# Patient Record
Sex: FEMALE | Race: BLACK OR AFRICAN AMERICAN | NOT HISPANIC OR LATINO | Employment: FULL TIME | URBAN - METROPOLITAN AREA
[De-identification: names, ages, dates, MRNs, and addresses within clinical notes are randomized per-mention and may not be internally consistent; named-entity substitution may affect disease eponyms.]

---

## 2020-01-24 ENCOUNTER — OFFICE VISIT (OUTPATIENT)
Dept: INTERNAL MEDICINE | Facility: CLINIC | Age: 40
End: 2020-01-24

## 2020-01-24 VITALS
RESPIRATION RATE: 18 BRPM | SYSTOLIC BLOOD PRESSURE: 134 MMHG | HEIGHT: 69 IN | BODY MASS INDEX: 43.4 KG/M2 | HEART RATE: 70 BPM | TEMPERATURE: 97.1 F | DIASTOLIC BLOOD PRESSURE: 90 MMHG | WEIGHT: 293 LBS | OXYGEN SATURATION: 98 %

## 2020-01-24 DIAGNOSIS — G56.03 BILATERAL CARPAL TUNNEL SYNDROME: ICD-10-CM

## 2020-01-24 DIAGNOSIS — E53.8 B12 DEFICIENCY: ICD-10-CM

## 2020-01-24 DIAGNOSIS — D25.9 UTERINE LEIOMYOMA, UNSPECIFIED LOCATION: ICD-10-CM

## 2020-01-24 DIAGNOSIS — E66.01 MORBID OBESITY (HCC): ICD-10-CM

## 2020-01-24 DIAGNOSIS — D50.9 IRON DEFICIENCY ANEMIA, UNSPECIFIED IRON DEFICIENCY ANEMIA TYPE: ICD-10-CM

## 2020-01-24 DIAGNOSIS — E11.9 TYPE 2 DIABETES MELLITUS WITHOUT COMPLICATION, WITHOUT LONG-TERM CURRENT USE OF INSULIN (HCC): ICD-10-CM

## 2020-01-24 DIAGNOSIS — I10 ESSENTIAL HYPERTENSION: Primary | ICD-10-CM

## 2020-01-24 LAB
ALBUMIN SERPL-MCNC: 4.2 G/DL (ref 3.5–5.2)
ALBUMIN/GLOB SERPL: 1.4 G/DL
ALP SERPL-CCNC: 65 U/L (ref 39–117)
ALT SERPL W P-5'-P-CCNC: 12 U/L (ref 1–33)
ANION GAP SERPL CALCULATED.3IONS-SCNC: 14 MMOL/L (ref 5–15)
AST SERPL-CCNC: 15 U/L (ref 1–32)
BASOPHILS # BLD AUTO: 0.03 10*3/MM3 (ref 0–0.2)
BASOPHILS NFR BLD AUTO: 0.5 % (ref 0–1.5)
BILIRUB SERPL-MCNC: 0.2 MG/DL (ref 0.2–1.2)
BUN BLD-MCNC: 8 MG/DL (ref 6–20)
BUN/CREAT SERPL: 9.5 (ref 7–25)
CALCIUM SPEC-SCNC: 9.5 MG/DL (ref 8.6–10.5)
CHLORIDE SERPL-SCNC: 101 MMOL/L (ref 98–107)
CO2 SERPL-SCNC: 24 MMOL/L (ref 22–29)
CREAT BLD-MCNC: 0.84 MG/DL (ref 0.57–1)
DEPRECATED RDW RBC AUTO: 44.4 FL (ref 37–54)
EOSINOPHIL # BLD AUTO: 0.19 10*3/MM3 (ref 0–0.4)
EOSINOPHIL NFR BLD AUTO: 3.2 % (ref 0.3–6.2)
ERYTHROCYTE [DISTWIDTH] IN BLOOD BY AUTOMATED COUNT: 15 % (ref 12.3–15.4)
FERRITIN SERPL-MCNC: 10.4 NG/ML (ref 13–150)
GFR SERPL CREATININE-BSD FRML MDRD: 92 ML/MIN/1.73
GLOBULIN UR ELPH-MCNC: 3.1 GM/DL
GLUCOSE BLD-MCNC: 89 MG/DL (ref 65–99)
HBA1C MFR BLD: 6.15 % (ref 4.8–5.6)
HCT VFR BLD AUTO: 37.9 % (ref 34–46.6)
HGB BLD-MCNC: 11.7 G/DL (ref 12–15.9)
IMM GRANULOCYTES # BLD AUTO: 0.01 10*3/MM3 (ref 0–0.05)
IMM GRANULOCYTES NFR BLD AUTO: 0.2 % (ref 0–0.5)
IRON 24H UR-MRATE: 18 MCG/DL (ref 37–145)
IRON SATN MFR SERPL: 4 % (ref 20–50)
LYMPHOCYTES # BLD AUTO: 2.49 10*3/MM3 (ref 0.7–3.1)
LYMPHOCYTES NFR BLD AUTO: 41.9 % (ref 19.6–45.3)
MCH RBC QN AUTO: 25.3 PG (ref 26.6–33)
MCHC RBC AUTO-ENTMCNC: 30.9 G/DL (ref 31.5–35.7)
MCV RBC AUTO: 82 FL (ref 79–97)
MONOCYTES # BLD AUTO: 0.44 10*3/MM3 (ref 0.1–0.9)
MONOCYTES NFR BLD AUTO: 7.4 % (ref 5–12)
NEUTROPHILS # BLD AUTO: 2.78 10*3/MM3 (ref 1.7–7)
NEUTROPHILS NFR BLD AUTO: 46.8 % (ref 42.7–76)
NRBC BLD AUTO-RTO: 0 /100 WBC (ref 0–0.2)
PLATELET # BLD AUTO: 267 10*3/MM3 (ref 140–450)
PMV BLD AUTO: 11 FL (ref 6–12)
POTASSIUM BLD-SCNC: 4.3 MMOL/L (ref 3.5–5.2)
PROT SERPL-MCNC: 7.3 G/DL (ref 6–8.5)
RBC # BLD AUTO: 4.62 10*6/MM3 (ref 3.77–5.28)
SODIUM BLD-SCNC: 139 MMOL/L (ref 136–145)
TIBC SERPL-MCNC: 504 MCG/DL (ref 298–536)
TRANSFERRIN SERPL-MCNC: 338 MG/DL (ref 200–360)
VIT B12 BLD-MCNC: 277 PG/ML (ref 211–946)
WBC NRBC COR # BLD: 5.94 10*3/MM3 (ref 3.4–10.8)

## 2020-01-24 PROCEDURE — 99204 OFFICE O/P NEW MOD 45 MIN: CPT | Performed by: NURSE PRACTITIONER

## 2020-01-24 PROCEDURE — 85025 COMPLETE CBC W/AUTO DIFF WBC: CPT | Performed by: NURSE PRACTITIONER

## 2020-01-24 PROCEDURE — 82728 ASSAY OF FERRITIN: CPT | Performed by: NURSE PRACTITIONER

## 2020-01-24 PROCEDURE — 84466 ASSAY OF TRANSFERRIN: CPT | Performed by: NURSE PRACTITIONER

## 2020-01-24 PROCEDURE — 82607 VITAMIN B-12: CPT | Performed by: NURSE PRACTITIONER

## 2020-01-24 PROCEDURE — 83540 ASSAY OF IRON: CPT | Performed by: NURSE PRACTITIONER

## 2020-01-24 PROCEDURE — 80053 COMPREHEN METABOLIC PANEL: CPT | Performed by: NURSE PRACTITIONER

## 2020-01-24 PROCEDURE — 83036 HEMOGLOBIN GLYCOSYLATED A1C: CPT | Performed by: NURSE PRACTITIONER

## 2020-01-24 RX ORDER — AMLODIPINE BESYLATE 2.5 MG/1
2.5 TABLET ORAL DAILY
Qty: 30 TABLET | Refills: 1 | Status: SHIPPED | OUTPATIENT
Start: 2020-01-24 | End: 2020-06-24 | Stop reason: SDUPTHER

## 2020-01-24 RX ORDER — NAPROXEN 500 MG/1
500 TABLET ORAL 2 TIMES DAILY WITH MEALS
Qty: 60 TABLET | Refills: 1 | Status: SHIPPED | OUTPATIENT
Start: 2020-01-24 | End: 2020-09-23

## 2020-01-24 NOTE — PROGRESS NOTES
Subjective   Fuad Nielsen is a 39 y.o. female here to establish care.  Chief Complaint   Patient presents with   • Establish Care   • Upper Extremity Issue     bilateral hand/finger numbness couple months       History of Present Illness     Patient is a 39-year-old female who is here to establish care.  She does have a history of hypertension and diabetes.  She reports that her diabetes was previously well controlled and she has not followed up on it in some time.  Diabetes, type II, dx  was on metformin. Off of it by .   Weight has been increasing. Gained at least 20 lbs recently.   Has not been eating healthy or exercising.   Does not monitor glucose.      She also notes that she had some anemia that was detected in 3/2019 and required IV iron once a week for 4 weeks.  She has had no additional follow-up on this either    Hypertension- chronic. Has previously tried hctz, labetolol, metoprolol, lisinopril, amlodipine.  She is not currently on any medications for her blood pressure.  She denies any headaches, dizziness, chest pain, dyspnea, palpitations, TIA symptoms, leg pain, and edema.    Having some tingling in both of her hands. Particularly in thumb side. 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 Has to shake her hands out to improve numbness. No numbness in lower extremities.  Reports that she has a family member who had similar issues with a vitamin B12 deficiency and wants to have her B12 checked.  Patient's reports she is also had a slightly low B12 in the past.    MRI 2018 to check her uterine fibroids. Noted during  pregnancy and required . Needs new GYN     The following portions of the patient's history were reviewed and updated as appropriate: allergies, current medications, past family history, past medical history, past social history, past surgical history and problem list.    Review of Systems   Constitutional: Positive for unexpected weight change.  Negative for appetite change, chills, diaphoresis, fatigue and fever.   Eyes: Negative for visual disturbance.   Respiratory: Negative for cough, chest tightness, shortness of breath and wheezing.    Cardiovascular: Negative for chest pain, palpitations and leg swelling.   Gastrointestinal: Negative for constipation, diarrhea, nausea and vomiting.   Endocrine: Negative for polydipsia, polyphagia and polyuria.   Genitourinary: Negative for difficulty urinating and dysuria.   Skin: Negative for color change and rash.   Neurological: Positive for numbness. Negative for dizziness, syncope, weakness, light-headedness and headaches.   Psychiatric/Behavioral: Negative for sleep disturbance.   All other systems reviewed and are negative.      No Known Allergies  Past Medical History:   Diagnosis Date   • Anemia    • Diabetes (CMS/HCC) 2009    diagnosed in  and was normal in    • Fibroids     uterine   • Hypertension      Past Surgical History:   Procedure Laterality Date   •  SECTION  2018   • GASTRIC SLEEVE LAPAROSCOPIC  10/2012   • OOPHORECTOMY Right 1999   • WISDOM TOOTH EXTRACTION       Family History   Problem Relation Age of Onset   • Obesity Mother    • Hypertension Mother      Social History     Socioeconomic History   • Marital status: Single     Spouse name: Not on file   • Number of children: Not on file   • Years of education: Not on file   • Highest education level: Not on file   Tobacco Use   • Smoking status: Former Smoker     Last attempt to quit: 2018     Years since quittin.4   • Smokeless tobacco: Never Used   Substance and Sexual Activity   • Alcohol use: Yes     Frequency: 2-4 times a month     Drinks per session: 1 or 2     Comment: 1-2 per month   • Drug use: Never   • Sexual activity: Yes     Birth control/protection: Nexplanon     Immunization History   Administered Date(s) Administered   • Hepatitis A 2014   • Influenza Quad Vaccine (Inpatient) 2015  "  • Influenza, Unspecified 11/01/2019   • Pneumococcal Polysaccharide (PPSV23) 10/01/2012   • Tdap 01/01/2018   • Typhoid Inactivated 06/03/2014       Current Outpatient Medications:   •  Etonogestrel (NEXPLANON SC), Inject  under the skin into the appropriate area as directed., Disp: , Rfl:   •  amLODIPine (NORVASC) 2.5 MG tablet, Take 1 tablet by mouth Daily., Disp: 30 tablet, Rfl: 1  •  naproxen (NAPROSYN) 500 MG tablet, Take 1 tablet by mouth 2 (Two) Times a Day With Meals., Disp: 60 tablet, Rfl: 1    Objective   Blood pressure 134/90, pulse 70, temperature 97.1 °F (36.2 °C), resp. rate 18, height 176 cm (69.3\"), weight (!) 167 kg (368 lb), last menstrual period 01/10/2020, SpO2 98 %.  Physical Exam   Constitutional: She is oriented to person, place, and time. She appears well-developed and well-nourished. No distress.   HENT:   Head: Normocephalic and atraumatic.   Eyes: Pupils are equal, round, and reactive to light. Conjunctivae are normal.   Neck: Normal range of motion. No JVD present.   Cardiovascular: Normal rate, regular rhythm, normal heart sounds and intact distal pulses.   No murmur heard.  Pulmonary/Chest: Effort normal and breath sounds normal. No respiratory distress. She exhibits no tenderness.   Abdominal: Soft. Normal appearance and bowel sounds are normal. She exhibits no distension. There is no tenderness.   Musculoskeletal: Normal range of motion. She exhibits no edema.   Positive Tinel and Phalen bilaterally   Neurological: She is alert and oriented to person, place, and time.   Skin: Skin is warm and dry. She is not diaphoretic. No erythema. No pallor.   Psychiatric: She has a normal mood and affect. Her behavior is normal. Judgment and thought content normal.   Nursing note and vitals reviewed.      Assessment/Plan   Fuad was seen today for establish care and upper extremity issue.    Diagnoses and all orders for this visit:    Essential hypertension  -     amLODIPine (NORVASC) 2.5 MG " tablet; Take 1 tablet by mouth Daily.  Blood pressure is elevated in office without medications.  We will place her on amlodipine.  Adverse effects discussed.  She will monitor blood pressure daily and notify me if running greater than 130/80 consistently.      Type 2 diabetes mellitus without complication, without long-term current use of insulin (CMS/[facility])  -     CBC & Differential  -     Ferritin  -     Iron Profile  -     Comprehensive Metabolic Panel  -     Hemoglobin A1c  -     Vitamin B12  -     CBC Auto Differential  Patient was previously diagnosed with type 2 diabetes.  She has been noncompliant with follow-up on this and is not currently on medications.  Her weight has steadily been increasing.  Will check A1c and likely initiate medications.  Will likely require metformin minimally.    Iron deficiency anemia, unspecified iron deficiency anemia type  -     CBC & Differential  -     Ferritin  -     Iron Profile  -     CBC Auto Differential  Historically patient has had some iron deficiency requiring iron transfusions.  We will check her iron profile and CBC today.    Uterine leiomyoma, unspecified location  -     Ambulatory Referral to Gynecology  Refer to gynecology.    Bilateral carpal tunnel syndrome  -     naproxen (NAPROSYN) 500 MG tablet; Take 1 tablet by mouth 2 (Two) Times a Day With Meals.  -      Wrist Hand Orthosis, Wrist Extension Control Cock-up  We will do a low-dose of naproxen.  Adverse effects of been discussed.  If blood pressures running high she has been advised not to take this as NSAIDs can increase blood pressure slightly.  Blood pressure is stable enough for temporary use.  We will also use cock-up splints at night.  I discussed the possibility of nerve conduction study with patient.  She declines to do this at this time and will let me know if she changes her mind.    Morbid obesity  Encourage cardiac diabetic diet and routine physical activity for weight reduction.      B12  deficiency    -     Vitamin B12    Labs sent as above- will notify of results and treat accordingly. If patient has not received results within one week, they will notify my office       Return in about 1 month (around 2/24/2020) for Recheck.  Plan of care discussed with pt. They verbalized understanding and agreement.       * Please note that portions of this note were completed with a voice recognition program. Efforts were made to edit the dictation but occasionally words are erroneously transcribed.

## 2020-01-27 PROBLEM — I10 ESSENTIAL HYPERTENSION: Status: ACTIVE | Noted: 2020-01-27

## 2020-01-27 PROBLEM — D50.9 IRON DEFICIENCY ANEMIA: Status: ACTIVE | Noted: 2020-01-27

## 2020-01-27 PROBLEM — E53.8 B12 DEFICIENCY: Status: ACTIVE | Noted: 2020-01-27

## 2020-01-27 PROBLEM — E11.9 TYPE 2 DIABETES MELLITUS WITHOUT COMPLICATION, WITHOUT LONG-TERM CURRENT USE OF INSULIN: Status: ACTIVE | Noted: 2020-01-27

## 2020-01-27 PROBLEM — E66.01 MORBID OBESITY: Status: ACTIVE | Noted: 2020-01-27

## 2020-01-27 PROBLEM — D25.9 UTERINE LEIOMYOMA: Status: ACTIVE | Noted: 2020-01-27

## 2020-01-27 PROBLEM — G56.03 BILATERAL CARPAL TUNNEL SYNDROME: Status: ACTIVE | Noted: 2020-01-27

## 2020-01-28 ENCOUNTER — TELEPHONE (OUTPATIENT)
Dept: INTERNAL MEDICINE | Facility: CLINIC | Age: 40
End: 2020-01-28

## 2020-01-28 DIAGNOSIS — D50.8 IRON DEFICIENCY ANEMIA SECONDARY TO INADEQUATE DIETARY IRON INTAKE: Primary | ICD-10-CM

## 2020-01-28 DIAGNOSIS — E11.9 TYPE 2 DIABETES MELLITUS WITHOUT COMPLICATION, WITHOUT LONG-TERM CURRENT USE OF INSULIN (HCC): ICD-10-CM

## 2020-01-28 RX ORDER — DOXYCYCLINE HYCLATE 50 MG/1
324 CAPSULE, GELATIN COATED ORAL
Qty: 90 TABLET | Refills: 2 | Status: SHIPPED | OUTPATIENT
Start: 2020-01-28 | End: 2020-09-28 | Stop reason: SDUPTHER

## 2020-01-28 NOTE — TELEPHONE ENCOUNTER
----- Message from FAMILIA Galaviz sent at 1/28/2020  9:15 AM EST -----  Please let her know that her labs do show that her iron is low.  I will send in iron supplementation for her to take -we need to recheck in 3 months.  Her B12 is on the low end of normal as well.  She needs to take vitamin B12 supplementation over-the-counter daily at 1000 mcg.  Her hemoglobin A1c is elevated at 6.15%.  I would recommend going ahead and adding metformin on daily.      Prescriptions sent.

## 2020-03-09 ENCOUNTER — OFFICE VISIT (OUTPATIENT)
Dept: OBSTETRICS AND GYNECOLOGY | Facility: CLINIC | Age: 40
End: 2020-03-09

## 2020-03-09 VITALS
HEIGHT: 69 IN | DIASTOLIC BLOOD PRESSURE: 98 MMHG | WEIGHT: 293 LBS | BODY MASS INDEX: 43.4 KG/M2 | SYSTOLIC BLOOD PRESSURE: 142 MMHG | RESPIRATION RATE: 14 BRPM

## 2020-03-09 DIAGNOSIS — Z97.5 NEXPLANON IN PLACE: ICD-10-CM

## 2020-03-09 DIAGNOSIS — Z01.419 WELL WOMAN EXAM WITH ROUTINE GYNECOLOGICAL EXAM: ICD-10-CM

## 2020-03-09 DIAGNOSIS — Z87.42 HISTORY OF ABNORMAL CERVICAL PAPANICOLAOU SMEAR: ICD-10-CM

## 2020-03-09 DIAGNOSIS — Z01.419 WELL WOMAN EXAM WITH ROUTINE GYNECOLOGICAL EXAM: Primary | ICD-10-CM

## 2020-03-09 PROCEDURE — 99385 PREV VISIT NEW AGE 18-39: CPT | Performed by: OBSTETRICS & GYNECOLOGY

## 2020-03-09 NOTE — PROGRESS NOTES
Subjective   Chief Complaint   Patient presents with   • Establish Care     No complaints     Fuad Nielsen is a 39 y.o. year old  presenting to be seen for her annual exam.  Patient has a history of abnormal Pap smears, history of HPV, and is undergone colposcopy.  She is using Nexplanon for birth control and will need this replaced in .  She is reasonably happy with this method.  Her cycles are irregular and she is coping with this.  Patient is taking metformin irregularly for elevated hemoglobin A1c.  This is being managed by her primary care.  Patient presents today for a Pap smear and initiation of GYN care.    SEXUAL Hx:  She is currently sexually active.  In the past year there has not been new sexual partners.    Condoms are not typically used.  She would not like to be screened for STD's at today's exam.  Current birth control method: Nexplanon.  She is happy with her current method of contraception and does not want to discuss alternative methods of contraception.  MENSTRUAL Hx:  No LMP recorded (lmp unknown).  In the past 6 months her cycles have been irregular.  Her menstrual flow is typically light.   Each month on average there are roughly 1 day(s) of very heavy flow.    Intermenstrual bleeding is present.    Post-coital bleeding is absent.  Dysmenorrhea: is not affecting her activities of daily living  PMS: is not affecting her activities of daily living  Her cycles are not a source of concern for her that she wishes to discuss today.  HEALTH Hx:  She exercises regularly:no (and has no plans to become more active).  She wears her seat belt:yes.  She has concerns about domestic violence: no.  OTHER COMPLAINTS:  Nothing else    The following portions of the patient's history were reviewed and updated as appropriate:problem list, current medications, allergies, past family history, past medical history, past social history and past surgical history.    Social History    Tobacco Use       "Smoking status: Former Smoker        Types: Cigarettes        Quit date: 2018        Years since quittin.6      Smokeless tobacco: Never Used    Review of Systems  Review of Systems - History obtained from the patient  General ROS: negative  Psychological ROS: negative  ENT ROS: negative  Allergy and Immunology ROS: positive for - seasonal allergies  Hematological and Lymphatic ROS: negative  Endocrine ROS: positive for diabetes probable type II, negative for thyroid problems  Breast ROS: negative  Respiratory ROS: no cough, shortness of breath, or wheezing  Cardiovascular ROS: positive for - irregular heartbeat  Gastrointestinal ROS: no abdominal pain, change in bowel habits, or black or bloody stools  Genito-Urinary ROS: no dysuria, trouble voiding, or hematuria  Musculoskeletal ROS: negative  Neurological ROS: no TIA or stroke symptoms  Dermatological ROS: negative        Objective   /98 (BP Location: Right arm, Patient Position: Sitting, Cuff Size: Large Adult)   Resp 14   Ht 175.3 cm (69\")   Wt (!) 167 kg (369 lb)   LMP  (LMP Unknown)   Breastfeeding No   BMI 54.49 kg/m²      General:  well developed; well nourished  no acute distress   Skin:  No suspicious lesions seen   Thyroid: not examined   Breasts:  Examined in supine position  Symmetric without masses or skin dimpling  Nipples normal without inversion, lesions or discharge  There are no palpable axillary nodes   Abdomen: soft, non-tender; no masses  no umbilical or inguinal hernias are present  no hepato-splenomegaly   Heart: regular rate and rhythm, S1, S2 normal, no murmur, click, rub or gallop   Lungs: clear to auscultation   Pelvis: Clinical staff was present for exam  External genitalia:  normal appearance of the external genitalia including Bartholin's and [place]'s glands.  :  urethral meatus normal;  Vaginal:  normal pink mucosa without prolapse or lesions.  Cervix:  normal appearance. Pap smear was done  Uterus:  not " palpable.  Adnexa:  non palpable bilaterally.  Rectal:  digital rectal exam not performed; anus visually normal appearing.          Assessment/Plan   Fuad was seen today for establish care.    Diagnoses and all orders for this visit:    Well woman exam with routine gynecological exam  -     Liquid-based Pap Smear, Screening; Future    Nexplanon in place    History of abnormal cervical Papanicolaou smear         Return in 1 year Pap smear is normal  Patient will return in October for birth control counseling or replacement of Nexplanon    This note was electronically signed.    Abdulaziz Uriarte MD   March 9, 2020

## 2020-06-17 ENCOUNTER — OFFICE VISIT (OUTPATIENT)
Dept: INTERNAL MEDICINE | Facility: CLINIC | Age: 40
End: 2020-06-17

## 2020-06-17 ENCOUNTER — LAB (OUTPATIENT)
Dept: LAB | Facility: HOSPITAL | Age: 40
End: 2020-06-17

## 2020-06-17 VITALS
DIASTOLIC BLOOD PRESSURE: 98 MMHG | SYSTOLIC BLOOD PRESSURE: 140 MMHG | WEIGHT: 293 LBS | RESPIRATION RATE: 18 BRPM | OXYGEN SATURATION: 99 % | HEART RATE: 95 BPM | BODY MASS INDEX: 43.4 KG/M2 | TEMPERATURE: 97.3 F | HEIGHT: 69 IN

## 2020-06-17 DIAGNOSIS — E66.01 MORBID OBESITY WITH BMI OF 50.0-59.9, ADULT (HCC): ICD-10-CM

## 2020-06-17 DIAGNOSIS — E11.9 TYPE 2 DIABETES MELLITUS WITHOUT COMPLICATION, WITHOUT LONG-TERM CURRENT USE OF INSULIN (HCC): Primary | ICD-10-CM

## 2020-06-17 DIAGNOSIS — N89.8 VAGINAL DISCHARGE: ICD-10-CM

## 2020-06-17 DIAGNOSIS — I10 ESSENTIAL HYPERTENSION: ICD-10-CM

## 2020-06-17 DIAGNOSIS — D50.8 IRON DEFICIENCY ANEMIA SECONDARY TO INADEQUATE DIETARY IRON INTAKE: ICD-10-CM

## 2020-06-17 DIAGNOSIS — Z11.59 ENCOUNTER FOR HEPATITIS C SCREENING TEST FOR LOW RISK PATIENT: ICD-10-CM

## 2020-06-17 DIAGNOSIS — Z11.3 SCREEN FOR STD (SEXUALLY TRANSMITTED DISEASE): ICD-10-CM

## 2020-06-17 LAB
BILIRUB BLD-MCNC: NEGATIVE MG/DL
CLARITY, POC: CLEAR
COLOR UR: YELLOW
GLUCOSE UR STRIP-MCNC: NEGATIVE MG/DL
HBA1C MFR BLD: 6.1 %
KETONES UR QL: NEGATIVE
LEUKOCYTE EST, POC: NEGATIVE
NITRITE UR-MCNC: NEGATIVE MG/ML
PH UR: 6 [PH] (ref 5–8)
PROT UR STRIP-MCNC: NEGATIVE MG/DL
RBC # UR STRIP: NEGATIVE /UL
SP GR UR: 1.03 (ref 1–1.03)
UROBILINOGEN UR QL: NORMAL

## 2020-06-17 PROCEDURE — 87591 N.GONORRHOEAE DNA AMP PROB: CPT | Performed by: NURSE PRACTITIONER

## 2020-06-17 PROCEDURE — 87798 DETECT AGENT NOS DNA AMP: CPT | Performed by: NURSE PRACTITIONER

## 2020-06-17 PROCEDURE — 85027 COMPLETE CBC AUTOMATED: CPT | Performed by: NURSE PRACTITIONER

## 2020-06-17 PROCEDURE — 82043 UR ALBUMIN QUANTITATIVE: CPT | Performed by: NURSE PRACTITIONER

## 2020-06-17 PROCEDURE — 87801 DETECT AGNT MULT DNA AMPLI: CPT | Performed by: NURSE PRACTITIONER

## 2020-06-17 PROCEDURE — 80053 COMPREHEN METABOLIC PANEL: CPT | Performed by: NURSE PRACTITIONER

## 2020-06-17 PROCEDURE — 83036 HEMOGLOBIN GLYCOSYLATED A1C: CPT | Performed by: NURSE PRACTITIONER

## 2020-06-17 PROCEDURE — 99214 OFFICE O/P EST MOD 30 MIN: CPT | Performed by: NURSE PRACTITIONER

## 2020-06-17 PROCEDURE — 87661 TRICHOMONAS VAGINALIS AMPLIF: CPT | Performed by: NURSE PRACTITIONER

## 2020-06-17 PROCEDURE — 84466 ASSAY OF TRANSFERRIN: CPT | Performed by: NURSE PRACTITIONER

## 2020-06-17 PROCEDURE — 86803 HEPATITIS C AB TEST: CPT | Performed by: NURSE PRACTITIONER

## 2020-06-17 PROCEDURE — 87491 CHLMYD TRACH DNA AMP PROBE: CPT | Performed by: NURSE PRACTITIONER

## 2020-06-17 PROCEDURE — 81003 URINALYSIS AUTO W/O SCOPE: CPT | Performed by: NURSE PRACTITIONER

## 2020-06-17 PROCEDURE — 83540 ASSAY OF IRON: CPT | Performed by: NURSE PRACTITIONER

## 2020-06-17 PROCEDURE — 82728 ASSAY OF FERRITIN: CPT | Performed by: NURSE PRACTITIONER

## 2020-06-17 RX ORDER — BLOOD-GLUCOSE METER
1 KIT MISCELLANEOUS DAILY
Qty: 1 EACH | Refills: 0 | Status: SHIPPED | OUTPATIENT
Start: 2020-06-17 | End: 2021-03-29 | Stop reason: SDUPTHER

## 2020-06-17 RX ORDER — LANCETS 30 GAUGE
1 EACH MISCELLANEOUS DAILY
Qty: 100 EACH | Refills: 11 | Status: SHIPPED | OUTPATIENT
Start: 2020-06-17

## 2020-06-17 NOTE — PROGRESS NOTES
Subjective   Fuad Nielsen is a 40 y.o. female.     Chief Complaint   Patient presents with   • Hypertension   • Diabetes   • iron deficiency       History of Present Illness     Diabetes-chronic.  Type II.  Diagnosed in 2009.  Has been able to be off medicines previously due to weight loss.  As of January she had been increasing her weight and has gained 20 pounds at that time.  Her last hemoglobin A1c was 6.15% in January 2020.  She was started on metformin at that time.  Today she reports she is not taking her metformin.  Weight has increased 3 pounds since January 2020.  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   Exercise: none    Hypertension-chronic.  Patient currently on amlodipine.  BP still slightly elevated.  Does not follow a low-sodium diet.  Not routinely exercising.  She is not monitoring her blood pressure at home.    Iron deficiency-chronic.  Initially noted in 3/2019.  At her last visit she was noted to have a ferritin of 10.4.  With an H&H of 11 and 37.  she was started on ferrous gluconate 3 times a day.  She reports she has not been taking this.  She does have some mild fatigue but otherwise feels well.    He has not had routine hepatitis C screening.  Patient denies any risk factors.      The following portions of the patient's history were reviewed and updated as appropriate: allergies, current medications, past family history, past medical history, past social history, past surgical history and problem list.        Review of Systems   Constitutional: Positive for fatigue. Negative for activity change, appetite change, chills, diaphoresis, fever, unexpected weight gain and unexpected weight loss.   HENT: Negative.  Negative for voice change.    Eyes: Negative for pain and visual disturbance.   Respiratory: Negative for cough, chest tightness, shortness of breath and wheezing.    Cardiovascular: Negative for chest pain, palpitations  and leg swelling.   Gastrointestinal: Negative for abdominal distention, abdominal pain, constipation, diarrhea, nausea and vomiting.   Endocrine: Negative for cold intolerance, heat intolerance, polydipsia, polyphagia and polyuria.   Genitourinary: Negative.  Negative for difficulty urinating.   Musculoskeletal: Negative.  Negative for arthralgias and myalgias.   Skin: Negative for color change, dry skin and rash.   Neurological: Negative for dizziness, facial asymmetry, weakness, light-headedness, numbness, headache and confusion.   Hematological: Does not bruise/bleed easily.   Psychiatric/Behavioral: Negative for decreased concentration and sleep disturbance. The patient is not nervous/anxious.            Outpatient Medications Marked as Taking for the 6/17/20 encounter (Office Visit) with Mandie Tobar APRN   Medication Sig Dispense Refill   • amLODIPine (NORVASC) 2.5 MG tablet Take 1 tablet by mouth Daily. 30 tablet 1   • Etonogestrel (NEXPLANON SC) Inject  under the skin into the appropriate area as directed.     • naproxen (NAPROSYN) 500 MG tablet Take 1 tablet by mouth 2 (Two) Times a Day With Meals. 60 tablet 1           Objective   Physical Exam   Constitutional: She is oriented to person, place, and time. She appears well-developed and well-nourished. No distress.   Obesity noted     HENT:   Head: Normocephalic and atraumatic.   Eyes: Pupils are equal, round, and reactive to light. Conjunctivae are normal.   Neck: Normal range of motion. No JVD present. No thyromegaly present.   Cardiovascular: Normal rate, regular rhythm, normal heart sounds and intact distal pulses.   No murmur heard.  Pulses:       Dorsalis pedis pulses are 2+ on the right side, and 2+ on the left side.        Posterior tibial pulses are 2+ on the right side, and 2+ on the left side.   Pulmonary/Chest: Effort normal and breath sounds normal. No respiratory distress. She exhibits no tenderness.   Abdominal: Soft. Normal appearance  "and bowel sounds are normal. She exhibits no distension. There is no tenderness.   Musculoskeletal: Normal range of motion. She exhibits no edema.   Neurological: She is alert and oriented to person, place, and time. Coordination normal.   Skin: Skin is warm and dry. No rash noted. She is not diaphoretic. No erythema. No pallor.   Psychiatric: She has a normal mood and affect. Her behavior is normal. Judgment and thought content normal.   Nursing note and vitals reviewed.      Vitals:    06/17/20 1410   BP: 140/98   Pulse: 95   Resp: 18   Temp: 97.3 °F (36.3 °C)   SpO2: 99%   Weight: (!) 168 kg (371 lb)   Height: 175.3 cm (69\")     Body mass index is 54.79 kg/m².        Assessment/Plan       Diagnoses and all orders for this visit:    1. Type 2 diabetes mellitus without complication, without long-term current use of insulin (CMS/[facility]) (Primary)  -     CBC (No Diff)  -     Iron Profile  -     Ferritin  -     Comprehensive Metabolic Panel  Lab Results   Component Value Date    HGBA1C 6.1 06/17/2020     Hemoglobin A1c well controlled at 6.1%.  Continue metformin.  Encourage diabetic diet and routine physical activity for weight reduction.    2. Essential hypertension  -     CBC (No Diff)  -     Iron Profile  -     Ferritin  -     Comprehensive Metabolic Panel  BP slightly elevated.  Patient reluctant to change medications.  We will continue the amlodipine and have her monitor blood pressure and if not meeting goal of less than 130/80 consistently she will let me know.  Encourage low-sodium diet and routine physical activity for weight reduction.  3. Iron deficiency anemia secondary to inadequate dietary iron intake  -     CBC (No Diff)  -     Iron Profile  -     Ferritin  -     Comprehensive Metabolic Panel  She does have some mild fatigue.  She did not take the iron supplementation.  We will recheck iron levels and CBC.  4. Morbid obesity with BMI of 50.0-59.9, adult (CMS/[facility])  Encourage diet and exercise for weight " reduction        Return in about 3 months (around 9/17/2020).    I discussed my findings,recommendations, and plan of care was with the patient. They verbalized understanding and agreement.  Patient was encouraged to keep me informed of any acute changes, lack of improvement, or any new concerning symptoms.       * Please note that portions of this note were completed with a voice recognition program. Efforts were made to edit the dictation but occasionally words are erroneously transcribed.

## 2020-06-18 LAB
ALBUMIN SERPL-MCNC: 4.3 G/DL (ref 3.5–5.2)
ALBUMIN UR-MCNC: <1.2 MG/DL
ALBUMIN/GLOB SERPL: 1.2 G/DL
ALP SERPL-CCNC: 62 U/L (ref 39–117)
ALT SERPL W P-5'-P-CCNC: 14 U/L (ref 1–33)
ANION GAP SERPL CALCULATED.3IONS-SCNC: 8.1 MMOL/L (ref 5–15)
AST SERPL-CCNC: 19 U/L (ref 1–32)
BILIRUB SERPL-MCNC: 0.2 MG/DL (ref 0.2–1.2)
BUN BLD-MCNC: 7 MG/DL (ref 6–20)
BUN/CREAT SERPL: 7.7 (ref 7–25)
CALCIUM SPEC-SCNC: 9.6 MG/DL (ref 8.6–10.5)
CHLORIDE SERPL-SCNC: 105 MMOL/L (ref 98–107)
CO2 SERPL-SCNC: 26.9 MMOL/L (ref 22–29)
CREAT BLD-MCNC: 0.91 MG/DL (ref 0.57–1)
DEPRECATED RDW RBC AUTO: 44.8 FL (ref 37–54)
ERYTHROCYTE [DISTWIDTH] IN BLOOD BY AUTOMATED COUNT: 14.9 % (ref 12.3–15.4)
FERRITIN SERPL-MCNC: 11.3 NG/ML (ref 13–150)
GFR SERPL CREATININE-BSD FRML MDRD: 83 ML/MIN/1.73
GLOBULIN UR ELPH-MCNC: 3.5 GM/DL
GLUCOSE BLD-MCNC: 74 MG/DL (ref 65–99)
HCT VFR BLD AUTO: 38.2 % (ref 34–46.6)
HCV AB SER DONR QL: NORMAL
HGB BLD-MCNC: 12.1 G/DL (ref 12–15.9)
IRON 24H UR-MRATE: 30 MCG/DL (ref 37–145)
IRON SATN MFR SERPL: 6 % (ref 20–50)
MCH RBC QN AUTO: 26.2 PG (ref 26.6–33)
MCHC RBC AUTO-ENTMCNC: 31.7 G/DL (ref 31.5–35.7)
MCV RBC AUTO: 82.7 FL (ref 79–97)
PLATELET # BLD AUTO: 272 10*3/MM3 (ref 140–450)
PMV BLD AUTO: 11.2 FL (ref 6–12)
POTASSIUM BLD-SCNC: 4 MMOL/L (ref 3.5–5.2)
PROT SERPL-MCNC: 7.8 G/DL (ref 6–8.5)
RBC # BLD AUTO: 4.62 10*6/MM3 (ref 3.77–5.28)
SODIUM BLD-SCNC: 140 MMOL/L (ref 136–145)
TIBC SERPL-MCNC: 544 MCG/DL (ref 298–536)
TRANSFERRIN SERPL-MCNC: 365 MG/DL (ref 200–360)
WBC NRBC COR # BLD: 5.74 10*3/MM3 (ref 3.4–10.8)

## 2020-06-23 ENCOUNTER — TELEPHONE (OUTPATIENT)
Dept: INTERNAL MEDICINE | Facility: CLINIC | Age: 40
End: 2020-06-23

## 2020-06-23 DIAGNOSIS — N76.0 BV (BACTERIAL VAGINOSIS): Primary | ICD-10-CM

## 2020-06-23 DIAGNOSIS — B96.89 BV (BACTERIAL VAGINOSIS): Primary | ICD-10-CM

## 2020-06-23 LAB
A VAGINAE DNA VAG QL NAA+PROBE: ABNORMAL SCORE
BVAB2 DNA VAG QL NAA+PROBE: ABNORMAL SCORE
C ALBICANS DNA VAG QL NAA+PROBE: NEGATIVE
C GLABRATA DNA VAG QL NAA+PROBE: NEGATIVE
C TRACH RRNA SPEC DONR QL NAA+PROBE: NEGATIVE
MEGASPHAERA 1: ABNORMAL SCORE
N GONORRHOEA DNA SPEC QL NAA+PROBE: NEGATIVE
T VAGINALIS RRNA GENITAL QL PROBE: NEGATIVE

## 2020-06-23 RX ORDER — METRONIDAZOLE 500 MG/1
500 TABLET ORAL 2 TIMES DAILY
Qty: 14 TABLET | Refills: 0 | Status: SHIPPED | OUTPATIENT
Start: 2020-06-23 | End: 2020-07-02

## 2020-06-23 NOTE — TELEPHONE ENCOUNTER
----- Message from FAMILIA Galaviz sent at 6/23/2020 12:38 PM EDT -----  Please let her know that her labs show she is positive for BV.  I will send in metronidazole to treat this.  Her ferritin is still significantly low she does need to take the iron supplement.  We should recheck in 3 months  She is negative for hepatitis C  Microalbumin was normal

## 2020-06-24 DIAGNOSIS — I10 ESSENTIAL HYPERTENSION: ICD-10-CM

## 2020-06-24 RX ORDER — AMLODIPINE BESYLATE 2.5 MG/1
2.5 TABLET ORAL DAILY
Qty: 30 TABLET | Refills: 2 | Status: SHIPPED | OUTPATIENT
Start: 2020-06-24 | End: 2020-09-23 | Stop reason: SDUPTHER

## 2020-08-24 PROCEDURE — U0003 INFECTIOUS AGENT DETECTION BY NUCLEIC ACID (DNA OR RNA); SEVERE ACUTE RESPIRATORY SYNDROME CORONAVIRUS 2 (SARS-COV-2) (CORONAVIRUS DISEASE [COVID-19]), AMPLIFIED PROBE TECHNIQUE, MAKING USE OF HIGH THROUGHPUT TECHNOLOGIES AS DESCRIBED BY CMS-2020-01-R: HCPCS | Performed by: PHYSICIAN ASSISTANT

## 2020-08-26 ENCOUNTER — TELEPHONE (OUTPATIENT)
Dept: URGENT CARE | Facility: CLINIC | Age: 40
End: 2020-08-26

## 2020-08-26 NOTE — TELEPHONE ENCOUNTER
----- Message from FAMILIA Metz sent at 8/26/2020  3:28 PM EDT -----  COVID not detected.  Follow CDC recommendations for quarantine and return to work.  Patient should follow-up with PCP  Pt called and given negative covid result. Pt states she is feeling about the same; still having low grade fevers. Will f/u with PCP. Advised that CDC recommends 10 day home quarantine from onset of symptoms. [place]

## 2020-08-27 ENCOUNTER — TELEPHONE (OUTPATIENT)
Dept: INTERNAL MEDICINE | Facility: CLINIC | Age: 40
End: 2020-08-27

## 2020-09-11 PROCEDURE — 87081 CULTURE SCREEN ONLY: CPT | Performed by: NURSE PRACTITIONER

## 2020-09-11 PROCEDURE — 87147 CULTURE TYPE IMMUNOLOGIC: CPT | Performed by: NURSE PRACTITIONER

## 2020-09-12 ENCOUNTER — TELEPHONE (OUTPATIENT)
Dept: URGENT CARE | Facility: CLINIC | Age: 40
End: 2020-09-12

## 2020-09-12 NOTE — TELEPHONE ENCOUNTER
Spoke with Itzel BARBOSA; Pt was told that throat culture grew strep and she needs to complete antibiotic round; Pt told if she isn't better after 3-4 days to f/u with PCP

## 2020-09-23 ENCOUNTER — OFFICE VISIT (OUTPATIENT)
Dept: INTERNAL MEDICINE | Facility: CLINIC | Age: 40
End: 2020-09-23

## 2020-09-23 ENCOUNTER — LAB (OUTPATIENT)
Dept: LAB | Facility: HOSPITAL | Age: 40
End: 2020-09-23

## 2020-09-23 VITALS
HEART RATE: 88 BPM | HEIGHT: 69 IN | SYSTOLIC BLOOD PRESSURE: 140 MMHG | WEIGHT: 293 LBS | BODY MASS INDEX: 43.4 KG/M2 | OXYGEN SATURATION: 99 % | TEMPERATURE: 97.3 F | RESPIRATION RATE: 18 BRPM | DIASTOLIC BLOOD PRESSURE: 90 MMHG

## 2020-09-23 DIAGNOSIS — E01.0 THYROMEGALY: ICD-10-CM

## 2020-09-23 DIAGNOSIS — J02.8 ACUTE BACTERIAL PHARYNGITIS: ICD-10-CM

## 2020-09-23 DIAGNOSIS — B96.89 ACUTE BACTERIAL PHARYNGITIS: ICD-10-CM

## 2020-09-23 DIAGNOSIS — M79.671 BILATERAL FOOT PAIN: ICD-10-CM

## 2020-09-23 DIAGNOSIS — M79.672 BILATERAL FOOT PAIN: ICD-10-CM

## 2020-09-23 DIAGNOSIS — E11.9 ENCOUNTER FOR DIABETIC FOOT EXAM (HCC): ICD-10-CM

## 2020-09-23 DIAGNOSIS — D50.8 IRON DEFICIENCY ANEMIA SECONDARY TO INADEQUATE DIETARY IRON INTAKE: ICD-10-CM

## 2020-09-23 DIAGNOSIS — G56.03 BILATERAL CARPAL TUNNEL SYNDROME: ICD-10-CM

## 2020-09-23 DIAGNOSIS — I10 ESSENTIAL HYPERTENSION: Primary | ICD-10-CM

## 2020-09-23 DIAGNOSIS — E11.9 TYPE 2 DIABETES MELLITUS WITHOUT COMPLICATION, WITHOUT LONG-TERM CURRENT USE OF INSULIN (HCC): ICD-10-CM

## 2020-09-23 DIAGNOSIS — E66.01 MORBID OBESITY WITH BMI OF 50.0-59.9, ADULT (HCC): ICD-10-CM

## 2020-09-23 LAB
BASOPHILS # BLD AUTO: 0.02 10*3/MM3 (ref 0–0.2)
BASOPHILS NFR BLD AUTO: 0.4 % (ref 0–1.5)
DEPRECATED RDW RBC AUTO: 43.9 FL (ref 37–54)
EOSINOPHIL # BLD AUTO: 0.09 10*3/MM3 (ref 0–0.4)
EOSINOPHIL NFR BLD AUTO: 1.6 % (ref 0.3–6.2)
ERYTHROCYTE [DISTWIDTH] IN BLOOD BY AUTOMATED COUNT: 15 % (ref 12.3–15.4)
FERRITIN SERPL-MCNC: 11.3 NG/ML (ref 13–150)
HBA1C MFR BLD: 5.8 %
HCT VFR BLD AUTO: 36.2 % (ref 34–46.6)
HGB BLD-MCNC: 11.5 G/DL (ref 12–15.9)
IMM GRANULOCYTES # BLD AUTO: 0.01 10*3/MM3 (ref 0–0.05)
IMM GRANULOCYTES NFR BLD AUTO: 0.2 % (ref 0–0.5)
LYMPHOCYTES # BLD AUTO: 2.1 10*3/MM3 (ref 0.7–3.1)
LYMPHOCYTES NFR BLD AUTO: 37 % (ref 19.6–45.3)
MCH RBC QN AUTO: 25.9 PG (ref 26.6–33)
MCHC RBC AUTO-ENTMCNC: 31.8 G/DL (ref 31.5–35.7)
MCV RBC AUTO: 81.5 FL (ref 79–97)
MONOCYTES # BLD AUTO: 0.47 10*3/MM3 (ref 0.1–0.9)
MONOCYTES NFR BLD AUTO: 8.3 % (ref 5–12)
NEUTROPHILS NFR BLD AUTO: 2.98 10*3/MM3 (ref 1.7–7)
NEUTROPHILS NFR BLD AUTO: 52.5 % (ref 42.7–76)
NRBC BLD AUTO-RTO: 0 /100 WBC (ref 0–0.2)
PLATELET # BLD AUTO: 268 10*3/MM3 (ref 140–450)
PMV BLD AUTO: 11.5 FL (ref 6–12)
RBC # BLD AUTO: 4.44 10*6/MM3 (ref 3.77–5.28)
TSH SERPL DL<=0.05 MIU/L-ACNC: 3.34 UIU/ML (ref 0.27–4.2)
WBC # BLD AUTO: 5.67 10*3/MM3 (ref 3.4–10.8)

## 2020-09-23 PROCEDURE — 99214 OFFICE O/P EST MOD 30 MIN: CPT | Performed by: NURSE PRACTITIONER

## 2020-09-23 PROCEDURE — 84443 ASSAY THYROID STIM HORMONE: CPT

## 2020-09-23 PROCEDURE — 85025 COMPLETE CBC W/AUTO DIFF WBC: CPT

## 2020-09-23 PROCEDURE — 36415 COLL VENOUS BLD VENIPUNCTURE: CPT

## 2020-09-23 PROCEDURE — 83036 HEMOGLOBIN GLYCOSYLATED A1C: CPT | Performed by: NURSE PRACTITIONER

## 2020-09-23 PROCEDURE — 82728 ASSAY OF FERRITIN: CPT

## 2020-09-23 RX ORDER — AMLODIPINE BESYLATE 10 MG/1
10 TABLET ORAL DAILY
Qty: 30 TABLET | Refills: 6 | Status: SHIPPED | OUTPATIENT
Start: 2020-09-23 | End: 2021-02-19 | Stop reason: SDUPTHER

## 2020-09-23 RX ORDER — AZITHROMYCIN 500 MG/1
500 TABLET, FILM COATED ORAL DAILY
Qty: 5 TABLET | Refills: 0 | Status: SHIPPED | OUTPATIENT
Start: 2020-09-23 | End: 2020-09-28

## 2020-09-23 RX ORDER — NAPROXEN 500 MG/1
500 TABLET ORAL 2 TIMES DAILY PRN
Qty: 60 TABLET | Refills: 3 | Status: SHIPPED | OUTPATIENT
Start: 2020-09-23 | End: 2021-09-28 | Stop reason: SDUPTHER

## 2020-09-23 RX ORDER — FLUCONAZOLE 150 MG/1
150 TABLET ORAL ONCE
Qty: 1 TABLET | Refills: 0 | Status: SHIPPED | OUTPATIENT
Start: 2020-09-23 | End: 2020-09-24

## 2020-09-23 NOTE — PROGRESS NOTES
Subjective   Fuad Nielsen is a 40 y.o. female.     Chief Complaint   Patient presents with   • Diabetes     last A1C on 6/17=6.1   • Hypertension       History of Present Illness       Diabetes-chronic, type II.  Diagnosed in 2009.  She was able to come off medications in the past due to weight loss.  Her last hemoglobin A1c was 6.1% on 6/17/2020.  Weight-down about 17 pounds since June 2020.  Glucose-not monitoring  Diet-eating a bit healthier and has cut down on the sweets.  Exercise-none     Hypertension-chronic.  She is currently on amlodipine.  Compliant with dosing and denies any adverse effects. Denies headaches, dizziness, visual disturbances, palpitations chest pain, dyspnea, TIA or CVA symptoms, leg pain/claudication symptoms, and edema.  Not routinely checking BP at home    Iron deficiency-chronic.  This was initially noted in 3/2019 with a ferritin of 10 and an H&H of 11 and 37.  She was not taking her iron supplementation at her last visit.  In June 2020 we rechecked her ferritin and was still low at 11 while her H&H were stable at 12 and 38.  She started taking some iron since then.    Bilateral carpal tunnel-chronic.  Responds well to naproxen.  No weakness.    Bilateral foot pain.  Worse on the days that she works.      She reports that she has been having some low-grade fevers for the last month or so.  T-max is about 100.  She has had 2- COVID tests.  She was also seen at urgent care on 824 for sore throat where she was tested for COVID and was found to be negative.  She went back to urgent care on 9/11/2020 where she was noted to have strep.  She was treated with an antibiotic and did not get better.  She notes that she did not change her toothbrush.      The following portions of the patient's history were reviewed and updated as appropriate: allergies, current medications, past family history, past medical history, past social history, past surgical history and problem list.      Review of  Systems   Constitutional: Positive for fatigue and fever. Negative for activity change, appetite change, chills, diaphoresis, unexpected weight gain and unexpected weight loss.   HENT: Positive for sore throat. Negative for congestion, postnasal drip, rhinorrhea, sinus pressure and trouble swallowing.    Eyes: Negative for pain and visual disturbance.   Respiratory: Negative for cough and shortness of breath.    Cardiovascular: Negative for chest pain, palpitations and leg swelling.   Gastrointestinal: Negative for abdominal pain, constipation and diarrhea.   Genitourinary: Negative.  Negative for difficulty urinating.   Musculoskeletal: Positive for arthralgias. Negative for gait problem and myalgias.   Skin: Negative for color change and rash.   Neurological: Negative for dizziness, weakness and headache.   Hematological: Does not bruise/bleed easily.   Psychiatric/Behavioral: Negative for self-injury, suicidal ideas and stress. The patient is not nervous/anxious.            Outpatient Medications Marked as Taking for the 9/23/20 encounter (Office Visit) with Mandie Tobar APRN   Medication Sig Dispense Refill   • amLODIPine (NORVASC) 10 MG tablet Take 1 tablet by mouth Daily. 30 tablet 6   • Blood Glucose Monitoring Suppl (FREESTYLE FREEDOM LITE) w/Device kit Use as directed to test blood sugar once daily 1 each 0   • Etonogestrel (NEXPLANON SC) Inject  under the skin into the appropriate area as directed.     • glucose blood test strip Use as directed to test blood sugar once Daily. 100 each 11   • glucose monitor monitoring kit 1 each Daily. 1 each 0   • Lancets misc Use as directed to test blood sugar once Daily. 100 each 11   • [DISCONTINUED] amLODIPine (NORVASC) 2.5 MG tablet Take 1 tablet by mouth Daily. (Patient taking differently: Take 5 mg by mouth Daily.) 30 tablet 2     No Known Allergies        Objective   Physical Exam  Vitals signs and nursing note reviewed.   Constitutional:       General: She  is not in acute distress.     Appearance: Normal appearance. She is well-developed. She is morbidly obese. She is not diaphoretic.   HENT:      Head: Normocephalic and atraumatic.      Comments: HEENT exam with PPE in use       Nose: Nose normal.      Mouth/Throat:      Lips: Pink.      Mouth: Mucous membranes are moist.      Tongue: No lesions.      Palate: No mass and lesions.      Pharynx: Oropharyngeal exudate and posterior oropharyngeal erythema present. No pharyngeal swelling or uvula swelling.      Tonsils: No tonsillar abscesses.   Eyes:      Conjunctiva/sclera: Conjunctivae normal.      Pupils: Pupils are equal, round, and reactive to light.   Neck:      Musculoskeletal: Normal range of motion.      Thyroid: Thyromegaly present. No thyroid mass or thyroid tenderness.      Vascular: No JVD.   Cardiovascular:      Rate and Rhythm: Normal rate and regular rhythm.      Pulses:           Dorsalis pedis pulses are 2+ on the right side and 2+ on the left side.        Posterior tibial pulses are 2+ on the right side and 2+ on the left side.      Heart sounds: Normal heart sounds. No murmur.   Pulmonary:      Effort: Pulmonary effort is normal. No respiratory distress.      Breath sounds: Normal breath sounds.   Chest:      Chest wall: No tenderness.   Abdominal:      General: Bowel sounds are normal. There is no distension.      Palpations: Abdomen is soft.      Tenderness: There is no abdominal tenderness.   Musculoskeletal: Normal range of motion.      Right foot: Normal range of motion. No deformity or foot drop.      Left foot: No deformity or foot drop.   Feet:      Right foot:      Skin integrity: No ulcer, blister, skin breakdown, erythema or warmth.      Left foot:      Skin integrity: No ulcer, blister, skin breakdown, erythema or warmth.      Comments: Diabetic Foot Exam Performed and Monofilament Test Performed    Skin:     General: Skin is warm and dry.      Coloration: Skin is not pale.      Findings:  "No erythema or rash.   Neurological:      Mental Status: She is alert and oriented to person, place, and time.      Coordination: Coordination normal.   Psychiatric:         Behavior: Behavior normal. Behavior is cooperative.         Thought Content: Thought content normal.         Judgment: Judgment normal.         Vitals:    09/23/20 1324   BP: 140/90   Pulse: 88   Resp: 18   Temp: 97.3 °F (36.3 °C)   SpO2: 99%   Weight: (!) 161 kg (354 lb)   Height: 175 cm (68.9\")     Body mass index is 52.43 kg/m².  Wt Readings from Last 3 Encounters:   09/23/20 (!) 161 kg (354 lb)   09/11/20 (!) 161 kg (354 lb 6.4 oz)   08/24/20 (!) 160 kg (352 lb)           Assessment/Plan       Diagnoses and all orders for this visit:    1. Essential hypertension (Primary)  -     amLODIPine (NORVASC) 10 MG tablet; Take 1 tablet by mouth Daily.  Dispense: 30 tablet; Refill: 6  Blood pressure uncontrolled.  We will increase the dose of her amlodipine.  Adverse effects discussed.  2. Type 2 diabetes mellitus without complication, without long-term current use of insulin (CMS/[facility])  -     POC Glycosylated Hemoglobin (Hb A1C)  -     Ambulatory Referral to Podiatry  Lab Results   Component Value Date    HGBA1C 5.8 09/23/2020   A1c well controlled at 5.8%.  We will hold off on any antidiabetic medications at her request and continue diet exercise.    3. Iron deficiency anemia secondary to inadequate dietary iron intake  -     CBC (No Diff); Future  -     Ferritin; Future  Recheck labs.  Plan to continue iron supplementation unless otherwise indicated by labs  4. Acute bacterial pharyngitis  -     CBC & Differential; Future  -     azithromycin (Zithromax) 500 MG tablet; Take 1 tablet by mouth Daily for 5 days.  Dispense: 5 tablet; Refill: 0  Azithromycin as directed for exudative pharyngitis  Other orders  -     fluconazole (Diflucan) 150 MG tablet; Take 1 tablet by mouth 1 (One) Time for 1 dose.  Dispense: 1 tablet; Refill: 0  Patient has been " prescribed an antibiotic for above conditions. I have discussed side effects with them. Educated patient on antibiotic reistance and patient encouraged to complete entire course of antibiotic.   Patient reports a history of vaginal yeast infections with all antibiotics and has required diflucan in the past. Patient requested dose of diflucan to have on hand.       5. Thyromegaly  -     TSH Rfx On Abnormal To Free T4; Future  We will check thyroid labs due to thyromegaly noted on exam.  May consider thyroid ultrasound.  However no masses noted on exam  6. Bilateral carpal tunnel syndrome  -     naproxen (NAPROSYN) 500 MG tablet; Take 1 tablet by mouth 2 (Two) Times a Day As Needed for Mild Pain With food  Dispense: 60 tablet; Refill: 3  Refill naproxen  7. Bilateral foot pain  -     naproxen (NAPROSYN) 500 MG tablet; Take 1 tablet by mouth 2 (Two) Times a Day As Needed for Mild Pain With food  Dispense: 60 tablet; Refill: 3  Naproxen as needed and refer to podiatry at her request.  Discussed weight loss and good supportive shoes/inserts for treatment of foot pain.    8. Encounter for diabetic foot exam (CMS/[facility])  Foot exam normal  9. Morbid obesity with BMI of 50.0-59.9, adult (CMS/[facility])  Encourage diet and exercise for weight reduction    Labs ordered as above- will notify of results and treat accordingly. If patient has not received results within one week, they will notify my office      Return in about 3 months (around 12/23/2020) for Labs today.    I discussed my findings,recommendations, and plan of care was with the patient. They verbalized understanding and agreement.  Patient was encouraged to keep me informed of any acute changes, lack of improvement, or any new concerning symptoms.       * Please note that portions of this note were completed with a voice recognition program. Efforts were made to edit the dictation but occasionally words are erroneously transcribed.

## 2020-09-28 ENCOUNTER — TELEPHONE (OUTPATIENT)
Dept: INTERNAL MEDICINE | Facility: CLINIC | Age: 40
End: 2020-09-28

## 2020-09-28 DIAGNOSIS — D50.8 IRON DEFICIENCY ANEMIA SECONDARY TO INADEQUATE DIETARY IRON INTAKE: ICD-10-CM

## 2020-09-28 RX ORDER — DOXYCYCLINE HYCLATE 50 MG/1
324 CAPSULE, GELATIN COATED ORAL
Qty: 90 TABLET | Refills: 2 | Status: SHIPPED | OUTPATIENT
Start: 2020-09-28 | End: 2021-01-12

## 2020-09-28 NOTE — TELEPHONE ENCOUNTER
----- Message from FAMILIA Galaviz sent at 9/27/2020 10:04 AM EDT -----  Please let her know that her ferritin level is still significantly decreased and her hemoglobin decreased a bit.  I would recommend she take the iron supplementation.  Does she need a new prescription sent?  Her thyroid labs were okay.  Other labs acceptable

## 2021-01-01 ENCOUNTER — HOSPITAL ENCOUNTER (INPATIENT)
Facility: HOSPITAL | Age: 41
LOS: 1 days | Discharge: HOME | End: 2021-01-02
Attending: EMERGENCY MEDICINE | Admitting: INTERNAL MEDICINE

## 2021-01-01 ENCOUNTER — APPOINTMENT (OUTPATIENT)
Dept: GENERAL RADIOLOGY | Facility: HOSPITAL | Age: 41
End: 2021-01-01

## 2021-01-01 ENCOUNTER — APPOINTMENT (OUTPATIENT)
Dept: CT IMAGING | Facility: HOSPITAL | Age: 41
End: 2021-01-01

## 2021-01-01 DIAGNOSIS — Z86.16 HISTORY OF 2019 NOVEL CORONAVIRUS DISEASE (COVID-19): ICD-10-CM

## 2021-01-01 DIAGNOSIS — I48.91 NEW ONSET ATRIAL FIBRILLATION (HCC): Primary | ICD-10-CM

## 2021-01-01 DIAGNOSIS — R06.02 SHORTNESS OF BREATH: ICD-10-CM

## 2021-01-01 DIAGNOSIS — R00.2 HEART PALPITATIONS: ICD-10-CM

## 2021-01-01 PROBLEM — I10 ESSENTIAL HYPERTENSION: Status: ACTIVE | Noted: 2021-01-01

## 2021-01-01 LAB
ALBUMIN SERPL-MCNC: 4.1 G/DL (ref 3.5–5.2)
ALBUMIN/GLOB SERPL: 1.1 G/DL
ALP SERPL-CCNC: 88 U/L (ref 39–117)
ALT SERPL W P-5'-P-CCNC: 17 U/L (ref 1–33)
ANION GAP SERPL CALCULATED.3IONS-SCNC: 9 MMOL/L (ref 5–15)
AST SERPL-CCNC: 21 U/L (ref 1–32)
B-HCG UR QL: NEGATIVE
BACTERIA UR QL AUTO: NORMAL /HPF
BASOPHILS # BLD AUTO: 0.03 10*3/MM3 (ref 0–0.2)
BASOPHILS NFR BLD AUTO: 0.5 % (ref 0–1.5)
BILIRUB SERPL-MCNC: 0.3 MG/DL (ref 0–1.2)
BILIRUB UR QL STRIP: NEGATIVE
BUN SERPL-MCNC: 6 MG/DL (ref 6–20)
BUN/CREAT SERPL: 7.2 (ref 7–25)
CALCIUM SPEC-SCNC: 9.4 MG/DL (ref 8.6–10.5)
CHLORIDE SERPL-SCNC: 105 MMOL/L (ref 98–107)
CLARITY UR: ABNORMAL
CO2 SERPL-SCNC: 26 MMOL/L (ref 22–29)
COLOR UR: YELLOW
CREAT SERPL-MCNC: 0.83 MG/DL (ref 0.57–1)
DEPRECATED RDW RBC AUTO: 45.1 FL (ref 37–54)
EOSINOPHIL # BLD AUTO: 0.16 10*3/MM3 (ref 0–0.4)
EOSINOPHIL NFR BLD AUTO: 2.5 % (ref 0.3–6.2)
ERYTHROCYTE [DISTWIDTH] IN BLOOD BY AUTOMATED COUNT: 14.8 % (ref 12.3–15.4)
FLUAV RNA RESP QL NAA+PROBE: NOT DETECTED
FLUBV RNA RESP QL NAA+PROBE: NOT DETECTED
GFR SERPL CREATININE-BSD FRML MDRD: 93 ML/MIN/1.73
GLOBULIN UR ELPH-MCNC: 3.6 GM/DL
GLUCOSE SERPL-MCNC: 92 MG/DL (ref 65–99)
GLUCOSE UR STRIP-MCNC: NEGATIVE MG/DL
HCT VFR BLD AUTO: 41.2 % (ref 34–46.6)
HGB BLD-MCNC: 12.5 G/DL (ref 12–15.9)
HGB UR QL STRIP.AUTO: NEGATIVE
HOLD SPECIMEN: NORMAL
HOLD SPECIMEN: NORMAL
HYALINE CASTS UR QL AUTO: NORMAL /LPF
IMM GRANULOCYTES # BLD AUTO: 0.01 10*3/MM3 (ref 0–0.05)
IMM GRANULOCYTES NFR BLD AUTO: 0.2 % (ref 0–0.5)
INTERNAL NEGATIVE CONTROL: NEGATIVE
INTERNAL POSITIVE CONTROL: POSITIVE
KETONES UR QL STRIP: NEGATIVE
LEUKOCYTE ESTERASE UR QL STRIP.AUTO: NEGATIVE
LYMPHOCYTES # BLD AUTO: 3.26 10*3/MM3 (ref 0.7–3.1)
LYMPHOCYTES NFR BLD AUTO: 50.2 % (ref 19.6–45.3)
Lab: NORMAL
MAGNESIUM SERPL-MCNC: 2.1 MG/DL (ref 1.6–2.6)
MCH RBC QN AUTO: 25.3 PG (ref 26.6–33)
MCHC RBC AUTO-ENTMCNC: 30.3 G/DL (ref 31.5–35.7)
MCV RBC AUTO: 83.2 FL (ref 79–97)
MONOCYTES # BLD AUTO: 0.58 10*3/MM3 (ref 0.1–0.9)
MONOCYTES NFR BLD AUTO: 8.9 % (ref 5–12)
NEUTROPHILS NFR BLD AUTO: 2.45 10*3/MM3 (ref 1.7–7)
NEUTROPHILS NFR BLD AUTO: 37.7 % (ref 42.7–76)
NITRITE UR QL STRIP: NEGATIVE
NRBC BLD AUTO-RTO: 0 /100 WBC (ref 0–0.2)
NT-PROBNP SERPL-MCNC: 43.5 PG/ML (ref 0–450)
PH UR STRIP.AUTO: 7 [PH] (ref 5–8)
PLATELET # BLD AUTO: 289 10*3/MM3 (ref 140–450)
PMV BLD AUTO: 10.6 FL (ref 6–12)
POTASSIUM SERPL-SCNC: 4.3 MMOL/L (ref 3.5–5.2)
PROT SERPL-MCNC: 7.7 G/DL (ref 6–8.5)
PROT UR QL STRIP: NEGATIVE
QT INTERVAL: 348 MS
QTC INTERVAL: 421 MS
RBC # BLD AUTO: 4.95 10*6/MM3 (ref 3.77–5.28)
RBC # UR: NORMAL /HPF
REF LAB TEST METHOD: NORMAL
SARS-COV-2 RNA RESP QL NAA+PROBE: DETECTED
SODIUM SERPL-SCNC: 140 MMOL/L (ref 136–145)
SP GR UR STRIP: <=1.005 (ref 1–1.03)
SQUAMOUS #/AREA URNS HPF: NORMAL /HPF
TROPONIN T SERPL-MCNC: <0.01 NG/ML (ref 0–0.03)
TSH SERPL DL<=0.05 MIU/L-ACNC: 2.11 UIU/ML (ref 0.27–4.2)
UROBILINOGEN UR QL STRIP: ABNORMAL
WBC # BLD AUTO: 6.49 10*3/MM3 (ref 3.4–10.8)
WBC UR QL AUTO: NORMAL /HPF
WHOLE BLOOD HOLD SPECIMEN: NORMAL
WHOLE BLOOD HOLD SPECIMEN: NORMAL

## 2021-01-01 PROCEDURE — 99223 1ST HOSP IP/OBS HIGH 75: CPT | Performed by: INTERNAL MEDICINE

## 2021-01-01 PROCEDURE — 0 IOPAMIDOL PER 1 ML: Performed by: EMERGENCY MEDICINE

## 2021-01-01 PROCEDURE — 84484 ASSAY OF TROPONIN QUANT: CPT | Performed by: EMERGENCY MEDICINE

## 2021-01-01 PROCEDURE — 85025 COMPLETE CBC W/AUTO DIFF WBC: CPT

## 2021-01-01 PROCEDURE — 83880 ASSAY OF NATRIURETIC PEPTIDE: CPT | Performed by: EMERGENCY MEDICINE

## 2021-01-01 PROCEDURE — 83735 ASSAY OF MAGNESIUM: CPT | Performed by: EMERGENCY MEDICINE

## 2021-01-01 PROCEDURE — 71045 X-RAY EXAM CHEST 1 VIEW: CPT

## 2021-01-01 PROCEDURE — 93005 ELECTROCARDIOGRAM TRACING: CPT | Performed by: EMERGENCY MEDICINE

## 2021-01-01 PROCEDURE — 87636 SARSCOV2 & INF A&B AMP PRB: CPT | Performed by: NURSE PRACTITIONER

## 2021-01-01 PROCEDURE — 93005 ELECTROCARDIOGRAM TRACING: CPT

## 2021-01-01 PROCEDURE — 25010000002 ENOXAPARIN PER 10 MG: Performed by: PHYSICIAN ASSISTANT

## 2021-01-01 PROCEDURE — 84443 ASSAY THYROID STIM HORMONE: CPT | Performed by: EMERGENCY MEDICINE

## 2021-01-01 PROCEDURE — 81001 URINALYSIS AUTO W/SCOPE: CPT

## 2021-01-01 PROCEDURE — 81025 URINE PREGNANCY TEST: CPT | Performed by: EMERGENCY MEDICINE

## 2021-01-01 PROCEDURE — 25010000002 HYDRALAZINE PER 20 MG: Performed by: PHYSICIAN ASSISTANT

## 2021-01-01 PROCEDURE — 71275 CT ANGIOGRAPHY CHEST: CPT

## 2021-01-01 PROCEDURE — 99284 EMERGENCY DEPT VISIT MOD MDM: CPT

## 2021-01-01 PROCEDURE — 81025 URINE PREGNANCY TEST: CPT

## 2021-01-01 PROCEDURE — 80053 COMPREHEN METABOLIC PANEL: CPT | Performed by: EMERGENCY MEDICINE

## 2021-01-01 RX ORDER — AMLODIPINE BESYLATE 5 MG/1
10 TABLET ORAL DAILY
Status: DISCONTINUED | OUTPATIENT
Start: 2021-01-02 | End: 2021-01-02

## 2021-01-01 RX ORDER — IBUPROFEN 800 MG/1
800 TABLET ORAL ONCE
Status: DISCONTINUED | OUTPATIENT
Start: 2021-01-01 | End: 2021-01-01

## 2021-01-01 RX ORDER — SODIUM CHLORIDE 0.9 % (FLUSH) 0.9 %
10 SYRINGE (ML) INJECTION EVERY 12 HOURS SCHEDULED
Status: DISCONTINUED | OUTPATIENT
Start: 2021-01-01 | End: 2021-01-02 | Stop reason: HOSPADM

## 2021-01-01 RX ORDER — SODIUM CHLORIDE 0.9 % (FLUSH) 0.9 %
10 SYRINGE (ML) INJECTION AS NEEDED
Status: DISCONTINUED | OUTPATIENT
Start: 2021-01-01 | End: 2021-01-02 | Stop reason: HOSPADM

## 2021-01-01 RX ORDER — ACETAMINOPHEN 500 MG
1000 TABLET ORAL ONCE
Status: COMPLETED | OUTPATIENT
Start: 2021-01-01 | End: 2021-01-01

## 2021-01-01 RX ORDER — ACETAMINOPHEN 325 MG/1
650 TABLET ORAL EVERY 4 HOURS PRN
Status: DISCONTINUED | OUTPATIENT
Start: 2021-01-01 | End: 2021-01-02 | Stop reason: HOSPADM

## 2021-01-01 RX ORDER — HYDRALAZINE HYDROCHLORIDE 20 MG/ML
10 INJECTION INTRAMUSCULAR; INTRAVENOUS ONCE
Status: COMPLETED | OUTPATIENT
Start: 2021-01-01 | End: 2021-01-01

## 2021-01-01 RX ORDER — ACETAMINOPHEN 160 MG/5ML
650 SOLUTION ORAL EVERY 4 HOURS PRN
Status: DISCONTINUED | OUTPATIENT
Start: 2021-01-01 | End: 2021-01-02 | Stop reason: HOSPADM

## 2021-01-01 RX ORDER — ACETAMINOPHEN 650 MG/1
650 SUPPOSITORY RECTAL EVERY 4 HOURS PRN
Status: DISCONTINUED | OUTPATIENT
Start: 2021-01-01 | End: 2021-01-02 | Stop reason: HOSPADM

## 2021-01-01 RX ORDER — METOPROLOL TARTRATE 5 MG/5ML
5 INJECTION INTRAVENOUS ONCE
Status: COMPLETED | OUTPATIENT
Start: 2021-01-01 | End: 2021-01-01

## 2021-01-01 RX ADMIN — SODIUM CHLORIDE, PRESERVATIVE FREE 10 ML: 5 INJECTION INTRAVENOUS at 22:41

## 2021-01-01 RX ADMIN — ACETAMINOPHEN 1000 MG: 500 TABLET ORAL at 20:44

## 2021-01-01 RX ADMIN — IOPAMIDOL 100 ML: 755 INJECTION, SOLUTION INTRAVENOUS at 20:22

## 2021-01-01 RX ADMIN — HYDRALAZINE HYDROCHLORIDE 10 MG: 20 INJECTION INTRAMUSCULAR; INTRAVENOUS at 20:45

## 2021-01-01 RX ADMIN — METOROPROLOL TARTRATE 5 MG: 5 INJECTION, SOLUTION INTRAVENOUS at 22:41

## 2021-01-01 RX ADMIN — ENOXAPARIN SODIUM 160 MG: 80 INJECTION SUBCUTANEOUS at 20:44

## 2021-01-02 ENCOUNTER — READMISSION MANAGEMENT (OUTPATIENT)
Dept: CALL CENTER | Facility: HOSPITAL | Age: 41
End: 2021-01-02

## 2021-01-02 ENCOUNTER — APPOINTMENT (OUTPATIENT)
Dept: CARDIOLOGY | Facility: HOSPITAL | Age: 41
End: 2021-01-02

## 2021-01-02 VITALS
WEIGHT: 293 LBS | SYSTOLIC BLOOD PRESSURE: 131 MMHG | DIASTOLIC BLOOD PRESSURE: 90 MMHG | OXYGEN SATURATION: 100 % | HEART RATE: 74 BPM | HEIGHT: 69 IN | TEMPERATURE: 98.3 F | BODY MASS INDEX: 43.4 KG/M2 | RESPIRATION RATE: 16 BRPM

## 2021-01-02 PROBLEM — U07.1 COVID-19: Status: ACTIVE | Noted: 2021-01-02

## 2021-01-02 LAB
ANION GAP SERPL CALCULATED.3IONS-SCNC: 11 MMOL/L (ref 5–15)
BASOPHILS # BLD AUTO: 0.04 10*3/MM3 (ref 0–0.2)
BASOPHILS NFR BLD AUTO: 0.6 % (ref 0–1.5)
BH CV ECHO MEAS - AO MAX PG (FULL): 7.3 MMHG
BH CV ECHO MEAS - AO MAX PG: 9.2 MMHG
BH CV ECHO MEAS - AO MEAN PG (FULL): 3.5 MMHG
BH CV ECHO MEAS - AO MEAN PG: 4.6 MMHG
BH CV ECHO MEAS - AO ROOT AREA (BSA CORRECTED): 1.1
BH CV ECHO MEAS - AO ROOT AREA: 6.6 CM^2
BH CV ECHO MEAS - AO ROOT DIAM: 2.9 CM
BH CV ECHO MEAS - AO V2 MAX: 151.3 CM/SEC
BH CV ECHO MEAS - AO V2 MEAN: 99 CM/SEC
BH CV ECHO MEAS - AO V2 VTI: 33.8 CM
BH CV ECHO MEAS - AVA(I,A): 1.4 CM^2
BH CV ECHO MEAS - AVA(I,D): 1.4 CM^2
BH CV ECHO MEAS - AVA(V,A): 1.4 CM^2
BH CV ECHO MEAS - AVA(V,D): 1.4 CM^2
BH CV ECHO MEAS - BSA(HAYCOCK): 3 M^2
BH CV ECHO MEAS - BSA: 2.7 M^2
BH CV ECHO MEAS - BZI_BMI: 55.1 KILOGRAMS/M^2
BH CV ECHO MEAS - BZI_METRIC_HEIGHT: 175.3 CM
BH CV ECHO MEAS - BZI_METRIC_WEIGHT: 169.2 KG
BH CV ECHO MEAS - EDV(CUBED): 112.1 ML
BH CV ECHO MEAS - EDV(MOD-SP4): 82 ML
BH CV ECHO MEAS - EDV(TEICH): 108.7 ML
BH CV ECHO MEAS - EF(CUBED): 70.4 %
BH CV ECHO MEAS - EF(MOD-BP): 60 %
BH CV ECHO MEAS - EF(MOD-SP4): 65.9 %
BH CV ECHO MEAS - EF(TEICH): 61.9 %
BH CV ECHO MEAS - ESV(CUBED): 33.2 ML
BH CV ECHO MEAS - ESV(MOD-SP4): 28 ML
BH CV ECHO MEAS - ESV(TEICH): 41.4 ML
BH CV ECHO MEAS - FS: 33.3 %
BH CV ECHO MEAS - IVS/LVPW: 0.81
BH CV ECHO MEAS - IVSD: 1.1 CM
BH CV ECHO MEAS - LA DIMENSION: 3.2 CM
BH CV ECHO MEAS - LA/AO: 1.1
BH CV ECHO MEAS - LAD MAJOR: 4.7 CM
BH CV ECHO MEAS - LV DIASTOLIC VOL/BSA (35-75): 30.5 ML/M^2
BH CV ECHO MEAS - LV MASS(C)D: 222.8 GRAMS
BH CV ECHO MEAS - LV MASS(C)DI: 82.8 GRAMS/M^2
BH CV ECHO MEAS - LV MAX PG: 1.8 MMHG
BH CV ECHO MEAS - LV MEAN PG: 1.1 MMHG
BH CV ECHO MEAS - LV SYSTOLIC VOL/BSA (12-30): 10.4 ML/M^2
BH CV ECHO MEAS - LV V1 MAX: 67.5 CM/SEC
BH CV ECHO MEAS - LV V1 MEAN: 48.4 CM/SEC
BH CV ECHO MEAS - LV V1 VTI: 15.8 CM
BH CV ECHO MEAS - LVIDD: 4.8 CM
BH CV ECHO MEAS - LVIDS: 3.2 CM
BH CV ECHO MEAS - LVLD AP4: 7.9 CM
BH CV ECHO MEAS - LVLS AP4: 6.2 CM
BH CV ECHO MEAS - LVOT AREA (M): 3.1 CM^2
BH CV ECHO MEAS - LVOT AREA: 3.1 CM^2
BH CV ECHO MEAS - LVOT DIAM: 2 CM
BH CV ECHO MEAS - LVPWD: 1.3 CM
BH CV ECHO MEAS - MV A MAX VEL: 47.6 CM/SEC
BH CV ECHO MEAS - MV DEC TIME: 0.2 SEC
BH CV ECHO MEAS - MV E MAX VEL: 47.6 CM/SEC
BH CV ECHO MEAS - MV E/A: 1
BH CV ECHO MEAS - MV MAX PG: 2.7 MMHG
BH CV ECHO MEAS - MV MEAN PG: 1.7 MMHG
BH CV ECHO MEAS - MV V2 MAX: 81.6 CM/SEC
BH CV ECHO MEAS - MV V2 MEAN: 62.9 CM/SEC
BH CV ECHO MEAS - MV V2 VTI: 18.6 CM
BH CV ECHO MEAS - MVA(VTI): 2.6 CM^2
BH CV ECHO MEAS - PA ACC SLOPE: 1682 CM/SEC^2
BH CV ECHO MEAS - PA ACC TIME: 0.05 SEC
BH CV ECHO MEAS - PA MAX PG: 3.9 MMHG
BH CV ECHO MEAS - PA PR(ACCEL): 56.8 MMHG
BH CV ECHO MEAS - PA V2 MAX: 99.3 CM/SEC
BH CV ECHO MEAS - SI(AO): 83.1 ML/M^2
BH CV ECHO MEAS - SI(CUBED): 29.3 ML/M^2
BH CV ECHO MEAS - SI(LVOT): 18 ML/M^2
BH CV ECHO MEAS - SI(MOD-SP4): 20.1 ML/M^2
BH CV ECHO MEAS - SI(TEICH): 25 ML/M^2
BH CV ECHO MEAS - SV(AO): 223.7 ML
BH CV ECHO MEAS - SV(CUBED): 78.9 ML
BH CV ECHO MEAS - SV(LVOT): 48.5 ML
BH CV ECHO MEAS - SV(MOD-SP4): 54 ML
BH CV ECHO MEAS - SV(TEICH): 67.2 ML
BUN SERPL-MCNC: 5 MG/DL (ref 6–20)
BUN/CREAT SERPL: 6.7 (ref 7–25)
CALCIUM SPEC-SCNC: 8.9 MG/DL (ref 8.6–10.5)
CHLORIDE SERPL-SCNC: 103 MMOL/L (ref 98–107)
CO2 SERPL-SCNC: 25 MMOL/L (ref 22–29)
CREAT SERPL-MCNC: 0.75 MG/DL (ref 0.57–1)
DEPRECATED RDW RBC AUTO: 44.6 FL (ref 37–54)
EOSINOPHIL # BLD AUTO: 0.16 10*3/MM3 (ref 0–0.4)
EOSINOPHIL NFR BLD AUTO: 2.5 % (ref 0.3–6.2)
ERYTHROCYTE [DISTWIDTH] IN BLOOD BY AUTOMATED COUNT: 14.9 % (ref 12.3–15.4)
GFR SERPL CREATININE-BSD FRML MDRD: 104 ML/MIN/1.73
GLUCOSE BLDC GLUCOMTR-MCNC: 137 MG/DL (ref 70–130)
GLUCOSE BLDC GLUCOMTR-MCNC: 163 MG/DL (ref 70–130)
GLUCOSE SERPL-MCNC: 96 MG/DL (ref 65–99)
HBA1C MFR BLD: 6.2 % (ref 4.8–5.6)
HCT VFR BLD AUTO: 39.3 % (ref 34–46.6)
HGB BLD-MCNC: 12 G/DL (ref 12–15.9)
IMM GRANULOCYTES # BLD AUTO: 0.01 10*3/MM3 (ref 0–0.05)
IMM GRANULOCYTES NFR BLD AUTO: 0.2 % (ref 0–0.5)
LEFT ATRIUM VOLUME INDEX: 16 ML/M2
LYMPHOCYTES # BLD AUTO: 4.02 10*3/MM3 (ref 0.7–3.1)
LYMPHOCYTES NFR BLD AUTO: 61.7 % (ref 19.6–45.3)
MAXIMAL PREDICTED HEART RATE: 181 BPM
MCH RBC QN AUTO: 25 PG (ref 26.6–33)
MCHC RBC AUTO-ENTMCNC: 30.5 G/DL (ref 31.5–35.7)
MCV RBC AUTO: 81.9 FL (ref 79–97)
MONOCYTES # BLD AUTO: 0.43 10*3/MM3 (ref 0.1–0.9)
MONOCYTES NFR BLD AUTO: 6.6 % (ref 5–12)
NEUTROPHILS NFR BLD AUTO: 1.86 10*3/MM3 (ref 1.7–7)
NEUTROPHILS NFR BLD AUTO: 28.4 % (ref 42.7–76)
NRBC BLD AUTO-RTO: 0 /100 WBC (ref 0–0.2)
PLAT MORPH BLD: NORMAL
PLATELET # BLD AUTO: 280 10*3/MM3 (ref 140–450)
PMV BLD AUTO: 11 FL (ref 6–12)
POTASSIUM SERPL-SCNC: 3.6 MMOL/L (ref 3.5–5.2)
RBC # BLD AUTO: 4.8 10*6/MM3 (ref 3.77–5.28)
RBC MORPH BLD: NORMAL
SODIUM SERPL-SCNC: 139 MMOL/L (ref 136–145)
STRESS TARGET HR: 154 BPM
WBC # BLD AUTO: 6.52 10*3/MM3 (ref 3.4–10.8)
WBC MORPH BLD: NORMAL

## 2021-01-02 PROCEDURE — 25010000002 SULFUR HEXAFLUORIDE MICROSPH 60.7-25 MG RECONSTITUTED SUSPENSION: Performed by: INTERNAL MEDICINE

## 2021-01-02 PROCEDURE — 82962 GLUCOSE BLOOD TEST: CPT

## 2021-01-02 PROCEDURE — 83036 HEMOGLOBIN GLYCOSYLATED A1C: CPT | Performed by: NURSE PRACTITIONER

## 2021-01-02 PROCEDURE — 85007 BL SMEAR W/DIFF WBC COUNT: CPT | Performed by: NURSE PRACTITIONER

## 2021-01-02 PROCEDURE — 93306 TTE W/DOPPLER COMPLETE: CPT | Performed by: INTERNAL MEDICINE

## 2021-01-02 PROCEDURE — 80048 BASIC METABOLIC PNL TOTAL CA: CPT | Performed by: NURSE PRACTITIONER

## 2021-01-02 PROCEDURE — 99254 IP/OBS CNSLTJ NEW/EST MOD 60: CPT | Performed by: INTERNAL MEDICINE

## 2021-01-02 PROCEDURE — 93306 TTE W/DOPPLER COMPLETE: CPT

## 2021-01-02 PROCEDURE — 85025 COMPLETE CBC W/AUTO DIFF WBC: CPT | Performed by: NURSE PRACTITIONER

## 2021-01-02 PROCEDURE — 99239 HOSP IP/OBS DSCHRG MGMT >30: CPT | Performed by: INTERNAL MEDICINE

## 2021-01-02 RX ORDER — AMLODIPINE BESYLATE 5 MG/1
10 TABLET ORAL DAILY
Status: DISCONTINUED | OUTPATIENT
Start: 2021-01-02 | End: 2021-01-02 | Stop reason: HOSPADM

## 2021-01-02 RX ORDER — METOPROLOL TARTRATE 5 MG/5ML
5 INJECTION INTRAVENOUS EVERY 6 HOURS SCHEDULED
Status: DISCONTINUED | OUTPATIENT
Start: 2021-01-02 | End: 2021-01-02 | Stop reason: HOSPADM

## 2021-01-02 RX ADMIN — METOPROLOL TARTRATE 12.5 MG: 25 TABLET, FILM COATED ORAL at 11:33

## 2021-01-02 RX ADMIN — SODIUM CHLORIDE, PRESERVATIVE FREE 10 ML: 5 INJECTION INTRAVENOUS at 09:24

## 2021-01-02 RX ADMIN — AMLODIPINE BESYLATE 10 MG: 5 TABLET ORAL at 04:07

## 2021-01-02 RX ADMIN — APIXABAN 5 MG: 5 TABLET, FILM COATED ORAL at 11:33

## 2021-01-02 RX ADMIN — SULFUR HEXAFLUORIDE 2 ML: KIT at 15:30

## 2021-01-02 RX ADMIN — ACETAMINOPHEN 650 MG: 325 TABLET, FILM COATED ORAL at 03:04

## 2021-01-02 NOTE — CONSULTS
[facility] Cardiology Consult    2021       Subjective:      Fuad Nielsen  [id number]  1980  1    Mandie Tobar APRN    Chief Complaint- New- onset Atrial Fibrillation      Problem List:  1. Atrial Fibrillation  A. New- onset 21  B. CHADS-VASc= 3  2. COVID -19 diagnosed 20  3. Hypertension  4. Morbid Obesity- BMI 55.13  5. Probable Sleep Apnea  6. Pre- Diabetes- A1C 6.2      amLODIPine, 10 mg, Oral, Daily  apixaban, 5 mg, Oral, Q12H  metoprolol tartrate, 12.5 mg, Oral, Q12H  metoprolol tartrate, 5 mg, Intravenous, Q6H  sodium chloride, 10 mL, Intravenous, Q12H         has No Known Allergies.    HPI: Miss Nielsen is a 40 yr old -american female who presented to the [facility] ED yesterday after she developed the acute onset of tachy-palpitations.  On arrival she was noted to be in atrial fibrillation.  She was diagnosed with COVID-19 on 20.  She denies any recent symptoms of chest pain, orthopnea, PND, CHF symptoms or syncope.     Cardiac risk factors: diabetes mellitus, hypertension and obesity (BMI >= 30 kg/m2).     History  Family History   Problem Relation Age of Onset   • Obesity Mother    • Hypertension Mother      Past Surgical History:   Procedure Laterality Date   •  SECTION  2018   • COLPOSCOPY W/ BIOPSY / CURETTAGE     • GASTRIC SLEEVE LAPAROSCOPIC  10/2012   • OOPHORECTOMY Right 1999   • WISDOM TOOTH EXTRACTION        Past Medical History:   Diagnosis Date   • Abnormal Pap smear of cervix    • Anemia    • Diabetes (CMS/HCC) 2009    diagnosed in  and was normal in    • Fibroids     uterine   • Hypertension      Social History     Tobacco Use   Smoking Status Former Smoker   • Types: Cigarettes   • Quit date: 2018   • Years since quittin.4   Smokeless Tobacco Never Used     Social History     Substance and Sexual Activity   Alcohol Use Yes   • Frequency: 2-4 times a month   • Drinks per session: 1 or 2    Comment:  "1-2 per month     Past Surgical History:   Procedure Laterality Date   •  SECTION  2018   • COLPOSCOPY W/ BIOPSY / CURETTAGE     • GASTRIC SLEEVE LAPAROSCOPIC  10/2012   • OOPHORECTOMY Right 1999   • WISDOM TOOTH EXTRACTION         Review of Systems  Review of Systems   Constitution: Negative.   Eyes: Negative.    Cardiovascular: Positive for irregular heartbeat and palpitations. Negative for chest pain.   Respiratory: Positive for shortness of breath.    Endocrine: Negative.    Hematologic/Lymphatic: Negative.    Skin: Negative.    Musculoskeletal: Negative.    Gastrointestinal: Negative.    Genitourinary: Negative.    Neurological: Positive for headaches.   Psychiatric/Behavioral: Negative.    Allergic/Immunologic: Negative.           Objective:     height is 175.3 cm (69\") and weight is 169 kg (373 lb 4.8 oz) (abnormal). Her oral temperature is 98.5 °F (36.9 °C). Her blood pressure is 112/64 and her pulse is 96. Her respiration is 16 and oxygen saturation is 100%.     Physical Exam  Constitutional:       Appearance: Normal appearance.   Neck:      Musculoskeletal: Normal range of motion.   Cardiovascular:      Rate and Rhythm: Normal rate and regular rhythm.      Heart sounds: Normal heart sounds.   Pulmonary:      Effort: Pulmonary effort is normal.   Abdominal:      Palpations: Abdomen is soft.   Musculoskeletal: Normal range of motion.   Skin:     General: Skin is warm and dry.   Neurological:      General: No focal deficit present.      Mental Status: She is alert.   Psychiatric:         Mood and Affect: Mood normal.         Behavior: Behavior normal.         Thought Content: Thought content normal.         Judgment: Judgment normal.         Cardiographics  ECG: atrial fib .  Echocardiogram: pending    Imaging  Chest x-ray: normal     Lab Review   Lab Results   Component Value Date    GLUCOSE 96 2021    BUN 5 (L) 2021    CREATININE 0.75 2021    EGFRIFAFRI 104 2021 "    BCR 6.7 (L) 01/02/2021    CO2 25.0 01/02/2021    CALCIUM 8.9 01/02/2021    ALBUMIN 4.10 01/01/2021    AST 21 01/01/2021    ALT 17 01/01/2021     Lab Results   Component Value Date    WBC 6.52 01/02/2021    HGB 12.0 01/02/2021    HCT 39.3 01/02/2021    MCV 81.9 01/02/2021     01/02/2021     A1C= 6.2     Assessment:    40 yr old  female with a history of recent COVID -19, hypertension, Diabetes Mellitus, morbid obesity and the acute onset of tachy-palpitations yesterday.  She presented to the  ED and was noted to be in atrial fibrillation.  Currently she has converted to NSR.        Plan:   1. Add Metoprolol 12.5 mg po bid.  2. Add Eliquis 5 mg po bid.  3. 14 day Zio- will be mailed to the patient.  4. Follow-up in 4 weeks for HFU with Dr. Harris  5. Outpatient sleep evaluation.  6.  Echo reviewed and normal.  Okay for discharge home from cardiac standpoint, follow-up with me in 6-8 weeks.      Scribed for Allen Harris MD by Sana Lobo RN. 1/2/2021  11:01 EST     Allen PURDY M.D., personally performed the services described in this documentation as scribed by the above named individual in my presence, and it is both accurate and complete.    Allen Harris MD, [facility] Cardiology  01/02/21  16:06 EST

## 2021-01-02 NOTE — PLAN OF CARE
Goal Outcome Evaluation:  A Fib,IV metoprolol given. Complaints of headache 8/10, BP elevated at this time, relieved with tylenol and dose of Norvasc. No other complaints at this time. Will continue to monitor.

## 2021-01-02 NOTE — NURSING NOTE
VSS, WNL for pt. Pt denies pain other than lingering headache. Pleasant, cooperative, verbalizes understanding of POC and d/c teaching. Plan to follow up with Dr Harris and begin Eliquis therapy. Tele showed a controlled AFib early this AM converting to a NSR around 10AM. Pt without any complaint of palpitations. Continues on room air. D/C home after echocardiogram complete.

## 2021-01-02 NOTE — H&P
"    [facility] Medicine Services  HISTORY AND PHYSICAL    Patient Name: Fuad Nielsen  : 1980  MRN: [id number]  Primary Care Physician: Mandie Tobar APRN  Date of admission: 2021    Pili Duarte, APRN 21 10:00 PM EST     Subjective   Subjective     Chief Complaint:  palpitations    HPI:  Fuad Nielsen is a 40 y.o. female with a past medical history significant for iron deficiency anemia and essential hypertension presents to the ED with complaints of palpitations.  Patient reports being diagnosed with COVID-19 on 20.  Her symptoms at that time felt as if \"I had a cold\"  Consisting of nasal congestion.  She reports coming off quarantine yesterday.  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 and is to return to work Monday.  This evening around 20:12 she began having palpitations.  She notes a prior history of palpitations but reports \"this was different.\"  She noticed her palpitations worsened as she went up stairs.  She did have associated shortness of air and lightheadedness therefore prompting her arrival to the ED.  She denies any fever, chills, nausea, vomiting, diarrhea, abdominal pain, chest pain or cough.  Upon arrival to the ED patient was noted to be in atrial fibrillation by EKG with HR of 88.  Currently while at bedside her HR is between 112-135.  Patient denies any prior history of atrial fibrillation.  Patient will be admitted to [facility] under the care of the Hospitalist for further evaluation and treatment.       Current COVID Risks are:  [] Fever []  Cough [x] Shortness of breath [] Fatigue [] Change in taste or smell    [] Exposure to COVID positive patient  [] High risk facility   []  NONE  QUARANTINE CLEARED BY health department- patient plans to return to work 1..20  Review of Systems   Constitutional: Negative for activity change, appetite change, chills, diaphoresis, fatigue, fever and unexpected weight change.   Eyes: Negative for " photophobia and visual disturbance.   Respiratory: Positive for shortness of breath. Negative for cough.    Cardiovascular: Positive for palpitations. Negative for chest pain and leg swelling.   Gastrointestinal: Negative for abdominal distention, abdominal pain, blood in stool, constipation, diarrhea, nausea and vomiting.   Genitourinary: Negative.    Musculoskeletal: Negative.    Neurological: Positive for light-headedness and headaches. Negative for dizziness, seizures, facial asymmetry, weakness and numbness.   Psychiatric/Behavioral: Negative.         All other systems reviewed and are negative.     Personal History     Past Medical History:   Diagnosis Date   • Abnormal Pap smear of cervix    • Anemia    • Diabetes (CMS/HCC) 2009    diagnosed in  and was normal in    • Fibroids     uterine   • Hypertension        Past Surgical History:   Procedure Laterality Date   •  SECTION  2018   • COLPOSCOPY W/ BIOPSY / CURETTAGE     • GASTRIC SLEEVE LAPAROSCOPIC  10/2012   • OOPHORECTOMY Right 1999   • WISDOM TOOTH EXTRACTION         Family History: family history includes Hypertension in her mother; Obesity in her mother. Otherwise pertinent FHx was reviewed and unremarkable.     Social History:  reports that she quit smoking about 2 years ago. Her smoking use included cigarettes. She has never used smokeless tobacco. She reports current alcohol use. She reports that she does not use drugs.  Social History     Social History Narrative   • Not on file       Medications:  Etonogestrel, FreeStyle Freedom Lite, amLODIPine, ferrous gluconate, freestyle, glucose blood, glucose monitor, and naproxen    No Known Allergies    Objective   Objective     Vital Signs:   Temp:  [97.8 °F (36.6 °C)] 97.8 °F (36.6 °C)  Heart Rate:  [] 97  Resp:  [18] 18  BP: (129-185)/() 144/108    Physical Exam  Vitals signs and nursing note reviewed.   Constitutional:       General: She is not in acute  distress.     Appearance: Normal appearance. She is obese. She is not ill-appearing, toxic-appearing or diaphoretic.   HENT:      Head: Normocephalic and atraumatic.      Nose: Nose normal.      Mouth/Throat:      Mouth: Mucous membranes are moist.      Pharynx: Oropharynx is clear.   Eyes:      General: No scleral icterus.        Right eye: No discharge.         Left eye: No discharge.      Extraocular Movements: Extraocular movements intact.      Conjunctiva/sclera: Conjunctivae normal.      Pupils: Pupils are equal, round, and reactive to light.   Neck:      Musculoskeletal: Normal range of motion and neck supple.   Cardiovascular:      Rate and Rhythm: Tachycardia present. Rhythm irregular.      Pulses: Normal pulses.      Heart sounds: Normal heart sounds.   Pulmonary:      Effort: Pulmonary effort is normal.      Breath sounds: Normal breath sounds.   Abdominal:      General: Bowel sounds are normal. There is no distension.      Palpations: Abdomen is soft. There is no mass.      Tenderness: There is no abdominal tenderness. There is no right CVA tenderness, left CVA tenderness, guarding or rebound.      Hernia: No hernia is present.   Musculoskeletal: Normal range of motion.         General: No swelling, tenderness, deformity or signs of injury.      Right lower leg: No edema.      Left lower leg: No edema.   Skin:     General: Skin is warm and dry.   Neurological:      General: No focal deficit present.      Mental Status: She is alert and oriented to person, place, and time. Mental status is at baseline.   Psychiatric:         Mood and Affect: Mood normal.         Behavior: Behavior normal.         Thought Content: Thought content normal.         Judgment: Judgment normal.            Results Reviewed:  I have personally reviewed most recent indicated data and agree with findings including:  [x]  Laboratory  [x]  Radiology  [x]  EKG/Telemetry  []  Pathology  []  Cardiac/Vascular Studies  []  Old records  []   Other:  Most pertinent findings include:EKG: atrial fibrillation , CTA negative    LAB RESULTS:      Lab 01/01/21  1831   WBC 6.49   HEMOGLOBIN 12.5   HEMATOCRIT 41.2   PLATELETS 289   NEUTROS ABS 2.45   IMMATURE GRANS (ABS) 0.01   LYMPHS ABS 3.26*   MONOS ABS 0.58   EOS ABS 0.16   MCV 83.2         Lab 01/01/21  1831   SODIUM 140   POTASSIUM 4.3   CHLORIDE 105   CO2 26.0   ANION GAP 9.0   BUN 6   CREATININE 0.83   GLUCOSE 92   CALCIUM 9.4   MAGNESIUM 2.1   TSH 2.110         Lab 01/01/21  1831   TOTAL PROTEIN 7.7   ALBUMIN 4.10   GLOBULIN 3.6   ALT (SGPT) 17   AST (SGOT) 21   BILIRUBIN 0.3   ALK PHOS 88         Lab 01/01/21  1831   PROBNP 43.5   TROPONIN T <0.010                 Brief Urine Lab Results  (Last result in the past 365 days)      Color   Clarity   Blood   Leuk Est   Nitrite   Protein   CREAT   Urine HCG        01/01/21 1844               Negative         Microbiology Results (last 10 days)     ** No results found for the last 240 hours. **          Xr Chest 1 View    Result Date: 1/1/2021  CR Chest 1 Vw INDICATION: Palpitations beginning a few hours ago COMPARISON:  None available. FINDINGS: Single portable AP view(s) of the chest.  The heart and mediastinal contours are normal. The lungs are clear. No pneumothorax or pleural effusion.     Impression: No acute cardiopulmonary findings. Signer Name: Clarence Moreira MD  Signed: 1/1/2021 7:38 PM  Workstation Name: [facility]  [facility]    Ct Angiogram Chest    Result Date: 1/1/2021  CTA Chest INDICATION: Shortness of air TECHNIQUE: CT angiogram of the chest with IV contrast. 3-D reconstructions were obtained and reviewed.   Radiation dose reduction techniques included automated exposure control or exposure modulation based on body size. Count of known CT and cardiac nuc med studies performed in previous 12 months: 0. COMPARISON: None available. FINDINGS: The lung fields are clear. No evidence of pulmonary embolism. No acute osseous  abnormality. There has been prior gastric surgery. Upper abdominal structures are otherwise unremarkable. There is a hiatal hernia. If positive, report RV/LV ratio if >.0.9     Impression: 1. Negative for pulmonary embolus. 2. No acute findings in the chest. Signer Name: Olya Mendoza MD  Signed: 1/1/2021 8:43 PM  Workstation Name: [id number]  [facility]           Assessment/Plan   Assessment & Plan       A-fib (CMS/[facility])    Type 2 diabetes mellitus without complication, without long-term current use of insulin (CMS/[facility])    Morbid obesity with BMI of 50.0-59.9, adult (CMS/[facility])    Essential hypertension      40 year old female presents to the ED with complaints of palpitations that began today. EKG consistent with new onset atrial fibrillation.     1) Atrial fibrillation, new onset  -HR on arrival 88, currently 112-135  -[id number] score: 2  -s/p therapeutic lovenox in the ED-- patient still with intermittent menses (on contraception)  -will try IV lopressor now, repeat  -echo in am  -consult cardiology in am-- possible cardioversion  -CTA of chest negative     2) Diabetes mellitus type 2  -check hgb A1c  -reports not on any medications currently   -fingersticks achs    3) Essential hypertension  -continue home norvasc     Morbid obesity  Uterine fibroids-- may prove to be troublesome on chronic anticoagulation    DVT prophylaxis:  Scds, lovenox: therapeutic dose given in the ED       CODE STATUS:  Full code - Boyfriend is her emergency contact  Code Status and Medical Interventions:   Ordered at: 01/01/21 7857     Level Of Support Discussed With:    Patient     Code Status:    CPR     Medical Interventions (Level of Support Prior to Arrest):    Full   Electronically signed by FAMILIA Baltazar, 01/01/21, 10:12 PM EST.    Attending   Admission Attestation       I have seen and examined the patient, performing an independent face-to-face diagnostic evaluation with plan of care reviewed and  "developed with the advanced practice clinician (APC).      Brief Summary Statement:     Fuad Nielsen is a 40 y.o. female with a past medical history significant for iron deficiency anemia and essential hypertension presents to the ED with complaints of palpitations.  Patient reports being diagnosed with COVID-19 on 12/21/20.   She had been feeling better other than some exertional dyspnea.  TOnight she had worsening of palpitation, associated with smothering feeling and well as a fullness in her chest.  She also has had some nasal burning.    Remainder of detailed HPI is as noted by APC and has been reviewed and/or edited by me for completeness.    Attending Physical Exam:  /96   Pulse 103   Temp 98.2 °F (36.8 °C) (Oral)   Resp 18   Ht 175.3 cm (69\")   Wt (!) 169 kg (373 lb 4.8 oz)   SpO2 100%   BMI 55.13 kg/m²     Patient is alert and talkative in no distress at rest  Neck is without mass or JVD  Heart is IRReg wo murmur  Lungs are unlabored on RA  Abdomen is soft without HSM or mass, not tender or distended  MAEW, no edema  Skin is without rash  Neurologic exam is nonfocal   Mood is appropriate    Brief Assessment/Plan :  See detailed assessment and plan developed with APC which I have reviewed and/or edited for completeness.    I believe this patient meets INPATIENT status due to new onset atrial fibrillation related to COVID infection.  I feel patient’s risk for adverse outcomes and need for care warrant INPATIENT evaluation and I predict the patient’s care encounter to likely last beyond 2 midnights.      Electronically signed by Crystal Seals MD, 01/02/21, 1:38 AM EST.             "

## 2021-01-02 NOTE — OUTREACH NOTE
Prep Survey      Responses   [facility] patient discharged from?  [place]   Is LACE score < 7 ?  Yes   Emergency Room discharge w/ pulse ox?  No   Eligibility  [facility]   Date of Admission  01/01/21   Date of Discharge  01/02/21   Discharge Disposition  Home or Self Care   Discharge diagnosis  A-fib,  Covid (Cleared from quarantine by health department)   Does the patient have one of the following disease processes/diagnoses(primary or secondary)?  COVID-19   Does the patient have Home health ordered?  No   Is there a DME ordered?  No   Prep survey completed?  Yes          Anabel Zamarripa RN

## 2021-01-02 NOTE — PROGRESS NOTES
Patient is on Apixaban.  Education provided on 1/2 in writing. (COVID) Information provided includes effects of medication, drug-drug and drug-food interactions, and signs/symptoms of bleeding and clotting.  Pharmacist available for questions as needed.    Thanks  Chapin Palumbo [facility]  1/2/2021  13:37 EST

## 2021-01-02 NOTE — PLAN OF CARE
Problem: Adult Inpatient Plan of Care  Goal: Plan of Care Review  Outcome: Met  Goal: Patient-Specific Goal (Individualized)  Outcome: Met  Goal: Absence of Hospital-Acquired Illness or Injury  Outcome: Met  Intervention: Identify and Manage Fall Risk  Recent Flowsheet Documentation  Taken 1/2/2021 1210 by Lucille Woodward RN  Safety Promotion/Fall Prevention:   activity supervised   assistive device/personal items within reach   clutter free environment maintained   fall prevention program maintained   nonskid shoes/slippers when out of bed   room organization consistent   safety round/check completed  Taken 1/2/2021 1010 by Lucille Woodward RN  Safety Promotion/Fall Prevention:   activity supervised   assistive device/personal items within reach   clutter free environment maintained   nonskid shoes/slippers when out of bed   room organization consistent   safety round/check completed  Taken 1/2/2021 0810 by Lucille Woodward RN  Safety Promotion/Fall Prevention:   activity supervised   assistive device/personal items within reach   clutter free environment maintained   fall prevention program maintained   nonskid shoes/slippers when out of bed   room organization consistent   safety round/check completed  Intervention: Prevent Skin Injury  Recent Flowsheet Documentation  Taken 1/2/2021 1210 by Lucille Woodward RN  Body Position:   neutral body alignment   neutral head position   position changed independently  Taken 1/2/2021 1010 by Lucille Woodward RN  Body Position: position changed independently  Taken 1/2/2021 0810 by Lucille Woodward RN  Body Position:   neutral body alignment   neutral head position   sitting up in bed  Intervention: Prevent and Manage VTE (venous thromboembolism) Risk  Recent Flowsheet Documentation  Taken 1/2/2021 1210 by Lucille Woodward RN  VTE Prevention/Management: (pt up in room/UAL)   bilateral   sequential compression devices off  Taken 1/2/2021 0810 by Lucille Woodward RN  VTE  Prevention/Management:   bilateral   sequential compression devices on   dorsiflexion/plantar flexion performed  Intervention: Prevent Infection  Recent Flowsheet Documentation  Taken 1/2/2021 1210 by Lucille Woodward RN  Infection Prevention:   environmental surveillance performed   equipment surfaces disinfected   personal protective equipment utilized   rest/sleep promoted   hand hygiene promoted   single patient room provided  Taken 1/2/2021 1010 by Lucille Woodward RN  Infection Prevention:   environmental surveillance performed   equipment surfaces disinfected   hand hygiene promoted   personal protective equipment utilized   rest/sleep promoted   single patient room provided  Taken 1/2/2021 0810 by Lucille Woodward RN  Infection Prevention:   environmental surveillance performed   rest/sleep promoted   equipment surfaces disinfected   hand hygiene promoted   personal protective equipment utilized   single patient room provided  Goal: Optimal Comfort and Wellbeing  Outcome: Met  Intervention: Provide Person-Centered Care  Recent Flowsheet Documentation  Taken 1/2/2021 1210 by Lucille Woodward RN  Trust Relationship/Rapport:   care explained   choices provided   emotional support provided   empathic listening provided   questions answered   questions encouraged   reassurance provided   thoughts/feelings acknowledged  Taken 1/2/2021 1010 by Lucille Woodward RN  Trust Relationship/Rapport:   care explained   choices provided   emotional support provided   empathic listening provided   questions answered   questions encouraged   reassurance provided   thoughts/feelings acknowledged  Taken 1/2/2021 0810 by Lucille Woodward RN  Trust Relationship/Rapport:   care explained   choices provided   emotional support provided   questions answered   empathic listening provided   questions encouraged   reassurance provided   thoughts/feelings acknowledged  Goal: Readiness for Transition of Care  Outcome: Met     Problem: Hypertension  Acute  Goal: Blood Pressure Within Desired Range  Outcome: Met  Intervention: Normalize Blood Pressure  Recent Flowsheet Documentation  Taken 1/2/2021 0810 by Lucille Woodward RN  Medication Review/Management: medications reviewed     Problem: Arrhythmia/Dysrhythmia  Goal: Normalized Cardiac Rhythm  Outcome: Met  Intervention: Monitor and Manage Cardiac Rhythm Effects  Recent Flowsheet Documentation  Taken 1/2/2021 1210 by Lucille Woodward, RN  Fluid/Electrolyte Management: fluids provided  Taken 1/2/2021 1010 by Lucille Woodward, RN  Fluid/Electrolyte Management: fluids provided  Taken 1/2/2021 0810 by Lucille Woodward RN  Fluid/Electrolyte Management: fluids provided   Goal Outcome Evaluation:

## 2021-01-02 NOTE — ED PROVIDER NOTES
Subjective   Patient is a 40-year-old female who presents emergency room today with complaints of heart palpitations.  Patient reports that she just was released from the health department after quarantine for testing positive for COVID-19.  She shares that approximately 430 this afternoon she began to experience a fluttering in her chest.  She shares that approximately in 2012 she had history of palpitations and felt this may have been coming back.  She sat down on her couch to rest and reports that the symptoms did not go away.  She then called her boyfriend and told her she needed to come to the emergency room for evaluation.  She shares that since this time she has felt as if her heart is fluttering.  She denies any pain but states that she does have some discomfort in her chest and a headache. She also reports an episode of shortness of breath when she was getting ready to come to the hospital.      History provided by:  Patient  Palpitations  Palpitations quality:  Irregular  Onset quality:  Sudden  Duration:  4 hours  Timing:  Constant  Progression:  Unchanged  Chronicity:  New  Context comment:  Recent Covid infection  Relieved by:  Nothing  Worsened by:  Nothing  Ineffective treatments:  None tried  Associated symptoms: chest pain, chest pressure and shortness of breath    Associated symptoms comment:  Headache  Risk factors: no hx of atrial fibrillation        Review of Systems   Constitutional: Negative for chills, fatigue and fever.   Eyes: Negative.    Respiratory: Positive for chest tightness and shortness of breath.    Cardiovascular: Positive for chest pain and palpitations.   Gastrointestinal: Negative.    Genitourinary: Negative.    Musculoskeletal: Negative.    Skin: Negative.    Neurological: Positive for headaches.   Psychiatric/Behavioral: Negative.        Past Medical History:   Diagnosis Date   • Abnormal Pap smear of cervix    • Anemia    • Diabetes (CMS/[facility]) 2009    diagnosed in 2009 and  was normal in    • Fibroids     uterine   • Hypertension        No Known Allergies    Past Surgical History:   Procedure Laterality Date   •  SECTION  2018   • COLPOSCOPY W/ BIOPSY / CURETTAGE     • GASTRIC SLEEVE LAPAROSCOPIC  10/2012   • OOPHORECTOMY Right 1999   • WISDOM TOOTH EXTRACTION         Family History   Problem Relation Age of Onset   • Obesity Mother    • Hypertension Mother        Social History     Socioeconomic History   • Marital status: Single     Spouse name: Not on file   • Number of children: Not on file   • Years of education: Not on file   • Highest education level: Not on file   Tobacco Use   • Smoking status: Former Smoker     Types: Cigarettes     Quit date: 2018     Years since quittin.4   • Smokeless tobacco: Never Used   Substance and Sexual Activity   • Alcohol use: Yes     Frequency: 2-4 times a month     Drinks per session: 1 or 2     Comment: 1-2 per month   • Drug use: Never   • Sexual activity: Yes     Partners: Male     Birth control/protection: Nexplanon           Objective   Physical Exam  Vitals signs and nursing note reviewed.   Constitutional:       General: She is not in acute distress.     Appearance: Normal appearance. She is obese. She is not ill-appearing or toxic-appearing.   HENT:      Head: Normocephalic and atraumatic.      Nose: Nose normal.      Mouth/Throat:      Mouth: Mucous membranes are moist.   Eyes:      Extraocular Movements: Extraocular movements intact.   Neck:      Musculoskeletal: Neck supple.   Cardiovascular:      Rate and Rhythm: Normal rate. Rhythm irregular.      Pulses: Normal pulses.   Pulmonary:      Effort: Pulmonary effort is normal.      Breath sounds: Normal breath sounds.   Abdominal:      Palpations: Abdomen is soft.      Tenderness: There is no abdominal tenderness.   Musculoskeletal: Normal range of motion.   Skin:     General: Skin is warm and dry.   Neurological:      General: No focal deficit present.       Mental Status: She is alert.   Psychiatric:         Mood and Affect: Mood normal.         Procedures           ED Course  ED Course as of Jan 02 0327   Sat Jan 02, 2021 0325 Patient presents to the emergency room for evaluation of heart palpitations after being diagnosed with COVID-19 and undergoing quarantine.  Patient found to be in atrial fibrillation on physical exam with irregularly irregular rhythm.  No additional acute abnormalities on physical exam.  Labs without acute abnormalities.  Negative CTA of the chest.  Patient received initial dose of Lovenox for anticoagulation and hydralazine for her blood pressure while in the emergency department.  Hospitalist consulted for admission and agreeable to accept patient.  Patient and family at bedside agreeable to plan of care and hospital admission.    [JG]      ED Course User Index  [JG] Akash Cook, PA      Recent Results (from the past 24 hour(s))   ECG 12 Lead    Collection Time: 01/01/21  6:16 PM   Result Value Ref Range    QT Interval 348 ms    QTC Interval 421 ms   Comprehensive Metabolic Panel    Collection Time: 01/01/21  6:31 PM    Specimen: Blood   Result Value Ref Range    Glucose 92 65 - 99 mg/dL    BUN 6 6 - 20 mg/dL    Creatinine 0.83 0.57 - 1.00 mg/dL    Sodium 140 136 - 145 mmol/L    Potassium 4.3 3.5 - 5.2 mmol/L    Chloride 105 98 - 107 mmol/L    CO2 26.0 22.0 - 29.0 mmol/L    Calcium 9.4 8.6 - 10.5 mg/dL    Total Protein 7.7 6.0 - 8.5 g/dL    Albumin 4.10 3.50 - 5.20 g/dL    ALT (SGPT) 17 1 - 33 U/L    AST (SGOT) 21 1 - 32 U/L    Alkaline Phosphatase 88 39 - 117 U/L    Total Bilirubin 0.3 0.0 - 1.2 mg/dL    eGFR  African Amer 93 >60 mL/min/1.73    Globulin 3.6 gm/dL    A/G Ratio 1.1 g/dL    BUN/Creatinine Ratio 7.2 7.0 - 25.0    Anion Gap 9.0 5.0 - 15.0 mmol/L   Magnesium    Collection Time: 01/01/21  6:31 PM    Specimen: Blood   Result Value Ref Range    Magnesium 2.1 1.6 - 2.6 mg/dL   Troponin    Collection Time: 01/01/21  6:31 PM     Specimen: Blood   Result Value Ref Range    Troponin T <0.010 0.000 - 0.030 ng/mL   TSH    Collection Time: 01/01/21  6:31 PM    Specimen: Blood   Result Value Ref Range    TSH 2.110 0.270 - 4.200 uIU/mL   BNP    Collection Time: 01/01/21  6:31 PM    Specimen: Blood   Result Value Ref Range    proBNP 43.5 0.0 - 450.0 pg/mL   Light Blue Top    Collection Time: 01/01/21  6:31 PM   Result Value Ref Range    Extra Tube hold for add-on    Green Top (Gel)    Collection Time: 01/01/21  6:31 PM   Result Value Ref Range    Extra Tube Hold for add-ons.    Lavender Top    Collection Time: 01/01/21  6:31 PM   Result Value Ref Range    Extra Tube hold for add-on    Gold Top - SST    Collection Time: 01/01/21  6:31 PM   Result Value Ref Range    Extra Tube Hold for add-ons.    CBC Auto Differential    Collection Time: 01/01/21  6:31 PM    Specimen: Blood   Result Value Ref Range    WBC 6.49 3.40 - 10.80 10*3/mm3    RBC 4.95 3.77 - 5.28 10*6/mm3    Hemoglobin 12.5 12.0 - 15.9 g/dL    Hematocrit 41.2 34.0 - 46.6 %    MCV 83.2 79.0 - 97.0 fL    MCH 25.3 (L) 26.6 - 33.0 pg    MCHC 30.3 (L) 31.5 - 35.7 g/dL    RDW 14.8 12.3 - 15.4 %    RDW-SD 45.1 37.0 - 54.0 fl    MPV 10.6 6.0 - 12.0 fL    Platelets 289 140 - 450 10*3/mm3    Neutrophil % 37.7 (L) 42.7 - 76.0 %    Lymphocyte % 50.2 (H) 19.6 - 45.3 %    Monocyte % 8.9 5.0 - 12.0 %    Eosinophil % 2.5 0.3 - 6.2 %    Basophil % 0.5 0.0 - 1.5 %    Immature Grans % 0.2 0.0 - 0.5 %    Neutrophils, Absolute 2.45 1.70 - 7.00 10*3/mm3    Lymphocytes, Absolute 3.26 (H) 0.70 - 3.10 10*3/mm3    Monocytes, Absolute 0.58 0.10 - 0.90 10*3/mm3    Eosinophils, Absolute 0.16 0.00 - 0.40 10*3/mm3    Basophils, Absolute 0.03 0.00 - 0.20 10*3/mm3    Immature Grans, Absolute 0.01 0.00 - 0.05 10*3/mm3    nRBC 0.0 0.0 - 0.2 /100 WBC   Urinalysis With Microscopic If Indicated (No Culture) - Urine, Clean Catch    Collection Time: 01/01/21  6:42 PM    Specimen: Urine, Clean Catch   Result Value Ref Range     Color, UA Yellow Yellow, Straw    Appearance, UA Cloudy (A) Clear    pH, UA 7.0 5.0 - 8.0    Specific Gravity, UA <=1.005 1.001 - 1.030    Glucose, UA Negative Negative    Ketones, UA Negative Negative    Bilirubin, UA Negative Negative    Blood, UA Negative Negative    Protein, UA Negative Negative    Leuk Esterase, UA Negative Negative    Nitrite, UA Negative Negative    Urobilinogen, UA 0.2 E.U./dL 0.2 - 1.0 E.U./dL   Urinalysis, Microscopic Only - Urine, Clean Catch    Collection Time: 01/01/21  6:42 PM    Specimen: Urine, Clean Catch   Result Value Ref Range    RBC, UA None Seen None Seen, 0-2 /HPF    WBC, UA 0-2 None Seen, 0-2 /HPF    Bacteria, UA None Seen None Seen, Trace /HPF    Squamous Epithelial Cells, UA 0-2 None Seen, 0-2 /HPF    Hyaline Casts, UA None Seen 0 - 6 /LPF    Methodology Automated Microscopy    POCT pregnancy, urine    Collection Time: 01/01/21  6:44 PM    Specimen: Urine   Result Value Ref Range    HCG, Urine, QL Negative Negative    Lot Number [id number]     Internal Positive Control Positive     Internal Negative Control Negative    COVID-19 and FLU A/B PCR - Swab, Nasopharynx    Collection Time: 01/01/21  9:55 PM    Specimen: Nasopharynx; Swab   Result Value Ref Range    COVID19 Detected (C) Not Detected - Ref. Range    Influenza A PCR Not Detected Not Detected    Influenza B PCR Not Detected Not Detected     Note: In addition to lab results from this visit, the labs listed above may include labs taken at another facility or during a different encounter within the last 24 hours. Please correlate lab times with ED admission and discharge times for further clarification of the services performed during this visit.    CT Angiogram Chest   Final Result   1. Negative for pulmonary embolus.      2. No acute findings in the chest.      Signer Name: Olya Mendoza MD    Signed: 1/1/2021 8:43 PM    Workstation Name: [id number]     [facility]      XR Chest 1 View   Final  "Result   No acute cardiopulmonary findings.      Signer Name: Clarence Moreira MD    Signed: 1/1/2021 7:38 PM    Workstation Name: [id number]     [facility]        Vitals:    01/01/21 2229 01/01/21 2241 01/02/21 0303 01/02/21 0305   BP: 138/96 138/96 (!) 133/102 (!) 150/102   BP Location: Left arm  Right arm Right arm   Patient Position: Sitting  Lying Lying   Pulse: 100 103 86 84   Resp: 18  18 18   Temp: 98.2 °F (36.8 °C)      TempSrc: Oral      SpO2: 100%  100% 100%   Weight: (!) 169 kg (373 lb 4.8 oz)      Height: 175.3 cm (69\")        Medications   sodium chloride 0.9 % flush 10 mL (has no administration in time range)   amLODIPine (NORVASC) tablet 10 mg (has no administration in time range)   sodium chloride 0.9 % flush 10 mL (10 mL Intravenous Given 1/1/21 2241)   sodium chloride 0.9 % flush 10 mL (has no administration in time range)   acetaminophen (TYLENOL) tablet 650 mg (650 mg Oral Given 1/2/21 0304)     Or   acetaminophen (TYLENOL) 160 MG/5ML solution 650 mg ( Oral Not Given:  See Alt 1/2/21 0304)     Or   acetaminophen (TYLENOL) suppository 650 mg ( Rectal Not Given:  See Alt 1/2/21 0304)   hydrALAZINE (APRESOLINE) injection 10 mg (10 mg Intravenous Given 1/1/21 2045)   enoxaparin (LOVENOX) syringe 160 mg (160 mg Subcutaneous Given 1/1/21 2044)   acetaminophen (TYLENOL) tablet 1,000 mg (1,000 mg Oral Given 1/1/21 2044)   iopamidol (ISOVUE-370) 76 % injection 100 mL (100 mL Intravenous Given 1/1/21 2022)   metoprolol tartrate (LOPRESSOR) injection 5 mg (5 mg Intravenous Given 1/1/21 2241)     ECG/EMG Results (last 24 hours)     Procedure Component Value Units Date/Time    ECG 12 Lead [[id number]] Collected: 01/01/21 1816     Updated: 01/01/21 1934     QT Interval 348 ms      QTC Interval 421 ms     Narrative:      Test Reason : Dysrhythmia triage protocol  Blood Pressure : **/** mmHG  Vent. Rate : 088 BPM     Atrial Rate : 147 BPM     P-R Int : 000 ms          QRS Dur : 082 ms      QT " Int : 348 ms       P-R-T Axes : 000 -15 026 degrees     QTc Int : 421 ms    ** Poor data quality, interpretation may be adversely affected  Atrial fibrillation  Minimal voltage criteria for LVH, may be normal variant  Possible Anterior infarct , age undetermined  Abnormal ECG  When compared with ECG of 19-JUL-2012 08:11,  Atrial fibrillation has replaced Sinus rhythm  Borderline criteria for Anterior infarct are now present  Confirmed by MARK AGUSTIN MD ([id number]) on 1/1/2021 7:34:05 PM    Referred By:  EDMD           Confirmed By:MARK AGUSTIN MD        ECG 12 Lead   Final Result   Test Reason : Dysrhythmia triage protocol   Blood Pressure : **/** mmHG   Vent. Rate : 088 BPM     Atrial Rate : 147 BPM      P-R Int : 000 ms          QRS Dur : 082 ms       QT Int : 348 ms       P-R-T Axes : 000 -15 026 degrees      QTc Int : 421 ms      ** Poor data quality, interpretation may be adversely affected   Atrial fibrillation   Minimal voltage criteria for LVH, may be normal variant   Possible Anterior infarct , age undetermined   Abnormal ECG   When compared with ECG of 19-JUL-2012 08:11,   Atrial fibrillation has replaced Sinus rhythm   Borderline criteria for Anterior infarct are now present   Confirmed by MARK AGUSTIN MD ([id number]) on 1/1/2021 7:34:05 PM      Referred By:  EDMD           Confirmed By:MARK AGUSTIN MD                                             MDM  Number of Diagnoses or Management Options  Heart palpitations: new and requires workup  History of 2019 novel coronavirus disease (COVID-19): new and requires workup  New onset atrial fibrillation (CMS/HCC): new and requires workup  Shortness of breath: new and requires workup     Amount and/or Complexity of Data Reviewed  Clinical lab tests: reviewed  Tests in the radiology section of CPT®: reviewed  Tests in the medicine section of CPT®: reviewed    Risk of Complications, Morbidity, and/or Mortality  Presenting problems: high  Diagnostic procedures:  moderate  Management options: moderate    Patient Progress  Patient progress: stable      Final diagnoses:   New onset atrial fibrillation (CMS/HCC)   Heart palpitations   Shortness of breath   History of 2019 novel coronavirus disease (COVID-19)            Akash Cook PA  01/02/21 0327

## 2021-01-02 NOTE — DISCHARGE SUMMARY
[facility] Medicine Services  DISCHARGE SUMMARY    Patient Name: Fuad Nielsen  : 1980  MRN: [id number]    Date of Admission: 2021  6:17 PM  Date of Discharge:  21  Primary Care Physician: Mandie Tobar APRN    Consults     Date and Time Order Name Status Description    2021 0032 Inpatient Cardiology Consult            Hospital Course     Presenting Problem:   A-fib (CMS/[facility]) [I48.91]  A-fib (CMS/HCC) [I48.91]    Active Hospital Problems    Diagnosis  POA   • **A-fib (CMS/HCC) [I48.91]  Yes     Priority: Medium   • COVID-19 [U07.1]  Yes   • Essential hypertension [I10]  Yes   • Type 2 diabetes mellitus without complication, without long-term current use of insulin (CMS/[facility]) [E11.9]  Yes   • Morbid obesity with BMI of 50.0-59.9, adult (CMS/[facility]) [E66.01, Z68.43]  Not Applicable      Resolved Hospital Problems   No resolved problems to display.          Hospital Course:  Fuad Nielsen is a 40 y.o. female with a history of iron deficiency anemia, hypertension, morbid obesity with a BMI 55 who presented to the emergency room with complaints of palpitations.  Recently was diagnosed with COVID-19 on  with symptoms mainly being that of nasal congestion.  She has been taken off quarantine by the health department is planning to go back to work on Monday.  Had acute onset of palpitations with some associated dyspnea and dizziness.  In the emergency room she was found to be in rapid atrial fibrillation.  No prior history of this.  Chest x-ray showed no evidence of Covid pneumonia.  Admitted for further evaluation.  Started on low-dose beta-blocker and actually can verted spontaneously to sinus rhythm sometimes in the morning after admission.  Seen by Dr. Harris from cardiology who recommends starting low-dose beta-blocker and Eliquis for anticoagulation.  She is getting an echocardiogram which will be followed up as an outpatient.  Cardiology will also arrange  for outpatient Holter monitor to be mailed to her.  To follow-up in the office in 4 to 6 weeks.  Follow-up with primary care physician within 1 week for close hospital follow-up.      Discharge Follow Up Recommendations for outpatient labs/diagnostics:  Cardiology is arranging holter monitor which will be mailed to her.    Day of Discharge     HPI:   Went back into sinus sometime this morning.  Feels well other than some subacute sinus congestion.      Review of Systems  Gen- No fevers, chills  CV- No chest pain, palpitations  Resp- No cough, dyspnea  GI- No N/V/D, abd pain    Vital Signs:   Temp:  [97.8 °F (36.6 °C)-98.5 °F (36.9 °C)] 98.3 °F (36.8 °C)  Heart Rate:  [] 79  Resp:  [16-18] 16  BP: (111-185)/() 131/90     Physical Exam:  Constitutional: No acute distress, awake, alert, sitting up in bed  HENT: NCAT, mucous membranes moist  Respiratory: Clear to auscultation bilaterally, respiratory effort normal   Cardiovascular: RRR (sinus rhythm), no murmurs, rubs, or gallops  Gastrointestinal: obese, Positive bowel sounds, soft, nontender, nondistended  Musculoskeletal: No bilateral ankle edema  Psychiatric: Appropriate affect, cooperative  Neurologic: Oriented x 3, no focal deficits  Skin: No rashes      Pertinent  and/or Most Recent Results     Results from last 7 days   Lab Units 01/02/21  0327 01/01/21  1831   WBC 10*3/mm3 6.52 6.49   HEMOGLOBIN g/dL 12.0 12.5   HEMATOCRIT % 39.3 41.2   PLATELETS 10*3/mm3 280 289   SODIUM mmol/L 139 140   POTASSIUM mmol/L 3.6 4.3   CHLORIDE mmol/L 103 105   CO2 mmol/L 25.0 26.0   BUN mg/dL 5* 6   CREATININE mg/dL 0.75 0.83   GLUCOSE mg/dL 96 92   CALCIUM mg/dL 8.9 9.4     Results from last 7 days   Lab Units 01/01/21  1831   BILIRUBIN mg/dL 0.3   ALK PHOS U/L 88   ALT (SGPT) U/L 17   AST (SGOT) U/L 21           Invalid input(s): TG  Results from last 7 days   Lab Units 01/02/21  0327 01/01/21  1831   TSH uIU/mL  --  2.110   HEMOGLOBIN A1C % 6.20*  --    PROBNP pg/mL   --  43.5   TROPONIN T ng/mL  --  <0.010       Brief Urine Lab Results  (Last result in the past 365 days)      Color   Clarity   Blood   Leuk Est   Nitrite   Protein   CREAT   Urine HCG        01/01/21 1844               Negative           Microbiology Results Abnormal     Procedure Component Value - Date/Time    COVID PRE-OP / PRE-PROCEDURE SCREENING ORDER (NO ISOLATION) - Swab, Nasopharynx [[id number]]  (Abnormal) Collected: 01/01/21 2155    Lab Status: Final result Specimen: Swab from Nasopharynx Updated: 01/01/21 2234    Narrative:      The following orders were created for panel order COVID PRE-OP / PRE-PROCEDURE SCREENING ORDER (NO ISOLATION) - Swab, Nasopharynx.  Procedure                               Abnormality         Status                     ---------                               -----------         ------                     COVID-19 and FLU A/B PCR...[[id number]]  Abnormal            Final result                 Please view results for these tests on the individual orders.    COVID-19 and FLU A/B PCR - Swab, Nasopharynx [[id number]]  (Abnormal) Collected: 01/01/21 2155    Lab Status: Final result Specimen: Swab from Nasopharynx Updated: 01/01/21 2234     COVID19 Detected     Influenza A PCR Not Detected     Influenza B PCR Not Detected    Narrative:      Fact sheet for providers: https://www.fda.gov/media/325794/download    Fact sheet for patients: https://www.fda.gov/media/989179/download    Test performed by PCR.  Influenza A and Influenza B negative results should be considered presumptive in samples that have a positive SARS-CoV-2 result.    Competitive inhibition studies showed that SARS-CoV-2 virus, when present at concentrations above 3.6E+04 copies/mL, can inhibit the detection and amplification of influenza A and influenza B virus RNA if present at or below 1.8E+02 copies/mL or 4.9E+02 copies/mL, respectively, and may lead to false negative influenza virus results. If co-infection with  influenza A or influenza B virus is suspected in samples with a positive SARS-CoV-2 result, the sample should be re-tested with another FDA cleared, approved, or authorized influenza test, if influenza virus detection would change clinical management.          Imaging Results (All)     Procedure Component Value Units Date/Time    CT Angiogram Chest [[id number]] Collected: 01/01/21 2043     Updated: 01/01/21 2045    Narrative:      CTA Chest    INDICATION:   Shortness of air    TECHNIQUE:   CT angiogram of the chest with IV contrast. 3-D reconstructions were obtained and reviewed.   Radiation dose reduction techniques included automated exposure control or exposure modulation based on body size. Count of known CT and cardiac nuc med studies  performed in previous 12 months: 0.     COMPARISON:   None available.    FINDINGS:   The lung fields are clear. No evidence of pulmonary embolism. No acute osseous abnormality. There has been prior gastric surgery. Upper abdominal structures are otherwise unremarkable. There is a hiatal hernia.    If positive, report RV/LV ratio if >.0.9      Impression:      1. Negative for pulmonary embolus.    2. No acute findings in the chest.    Signer Name: Olya Mendoza MD   Signed: 1/1/2021 8:43 PM   Workstation Name: [id number]    [facility]    XR Chest 1 View [[id number]] Collected: 01/01/21 1938     Updated: 01/01/21 1940    Narrative:      CR Chest 1 Vw    INDICATION:   Palpitations beginning a few hours ago     COMPARISON:    None available.    FINDINGS:  Single portable AP view(s) of the chest.  The heart and mediastinal contours are normal. The lungs are clear. No pneumothorax or pleural effusion.      Impression:      No acute cardiopulmonary findings.    Signer Name: Clarence Moreira MD   Signed: 1/1/2021 7:38 PM   Workstation Name: [id number]    [facility]          Plan for Follow-up of Pending Labs/Results:   Dr. Harris will follow up  echo results    Discharge Details        Discharge Medications      New Medications      Instructions Start Date   apixaban 5 MG tablet tablet  Commonly known as: ELIQUIS   5 mg, Oral, Every 12 Hours Scheduled      metoprolol tartrate 25 MG tablet  Commonly known as: LOPRESSOR   12.5 mg, Oral, Every 12 Hours Scheduled         Continue These Medications      Instructions Start Date   amLODIPine 10 MG tablet  Commonly known as: NORVASC   10 mg, Oral, Daily      ferrous gluconate 324 MG tablet  Commonly known as: FERGON   324 mg, Oral, 3 Times Daily With Meals      FreeStyle Freedom Lite w/Device kit   Use as directed to test blood sugar once daily      freestyle lancets   Use as directed to test blood sugar once Daily.      FREESTYLE LITE test strip  Generic drug: glucose blood   Use as directed to test blood sugar once Daily.      glucose monitor monitoring kit   1 each, Does not apply, Daily      naproxen 500 MG tablet  Commonly known as: NAPROSYN   Take 1 tablet by mouth 2 (Two) Times a Day As Needed for Mild Pain With food      NEXPLANON SC   Subcutaneous             No Known Allergies      Discharge Disposition:  Home or Self Care    Diet:  Hospital:  Diet Order   Procedures   • Diet Regular; Cardiac, Consistent Carbohydrate       Activity:  Activity Instructions     Activity as Tolerated             CODE STATUS:    Code Status and Medical Interventions:   Ordered at: 01/01/21 2216     Level Of Support Discussed With:    Patient     Code Status:    CPR     Medical Interventions (Level of Support Prior to Arrest):    Full       Future Appointments   Date Time Provider Department Center   3/10/2021  8:00 AM Abdulaziz Uriarte MD [facility] None       Additional Instructions for the Follow-ups that You Need to Schedule     Discharge Follow-up with PCP   As directed       Currently Documented PCP:    Mandie Tobar APRN    PCP Phone Number:    [phone]     Follow Up Details: PCP 1 week         Discharge  Follow-up with Specified Provider: Dr. Harris 4-6 weeks   As directed      To: Dr. Harris 4-6 weeks               Harish Atkinson MD  01/02/21      Time Spent on Discharge:  I spent 32 minutes on this discharge activity which included: face-to-face encounter with the patient, reviewing the data in the system, coordination of the care with the nursing staff as well as consultants, documentation, and entering orders.

## 2021-01-03 ENCOUNTER — TRANSITIONAL CARE MANAGEMENT TELEPHONE ENCOUNTER (OUTPATIENT)
Dept: CALL CENTER | Facility: HOSPITAL | Age: 41
End: 2021-01-03

## 2021-01-03 NOTE — OUTREACH NOTE
Call Center TCM Note      Responses   [facility] patient discharged from?  [place]   Does the patient have one of the following disease processes/diagnoses(primary or secondary)?  COVID-19   COVID-19 underlying condition?  None   TCM attempt successful?  Yes   Call start time  1113   Call end time  1122   Discharge diagnosis  A-fib,  Covid (Cleared from quarantine by health department)   Is patient permission given to speak with other caregiver?  Yes   Person spoke with today (if not patient) and relationship  Demince- sig other   Meds reviewed with patient/caregiver?  Yes   Is the patient having any side effects they believe may be caused by any medication additions or changes?  No   Does the patient have all medications ordered at discharge?  Yes   Is the patient taking all medications as directed (includes completed medication regime)?  Yes   Does the patient have a primary care provider?   Yes   Comments regarding PCP  Boyfriend prefers to let patient make followup. Encouraged him to tell her to call tomorrow to schedule.    Does the patient have an appointment with their PCP or specialist within 7 days of discharge?  No   What is preventing the patient from scheduling follow up appointments within 7 days of discharge?  Haven't had time   Nursing Interventions  Educated patient on importance of making appointment, Advised patient to make appointment   Has the patient kept scheduled appointments due by today?  N/A   Has home health visited the patient within 72 hours of discharge?  N/A   Has all DME been delivered?  No   Psychosocial issues?  No   Did the patient receive a copy of their discharge instructions?  Yes   Did the patient receive a copy of COVID-19 specific instructions?  Yes   Nursing interventions  Reviewed instructions with patient   What is the patient's perception of their health status since discharge?  Improving   Does the patient have any of the following symptoms?  None   Nursing  Interventions  Nurse provided patient education   Is the patient/caregiver able to teach back steps to recovery at home?  Set small, achievable goals for return to baseline health, Rest and rebuild strength, gradually increase activity, Make a list of questions for provider's appointment   If the patient is a current smoker, are they able to teach back resources for cessation?  Not a smoker   Is the patient/caregiver able to teach back the hierarchy of who to call/visit for symptoms/problems? PCP, Specialist, Home health nurse, Urgent Care, ED, 911  Yes   TCM call completed?  Yes   Wrap up additional comments  Patient will need additional teaching, as I spoke with boyfriend who was on media release. He reports she is doing well.           Nikko Santoro RN    1/3/2021, 11:23 EST

## 2021-01-04 ENCOUNTER — READMISSION MANAGEMENT (OUTPATIENT)
Dept: CALL CENTER | Facility: HOSPITAL | Age: 41
End: 2021-01-04

## 2021-01-04 ENCOUNTER — EPISODE CHANGES (OUTPATIENT)
Dept: CASE MANAGEMENT | Facility: OTHER | Age: 41
End: 2021-01-04

## 2021-01-04 NOTE — OUTREACH NOTE
COVID-19 Week 1 Survey      Responses   [facility] patient discharged from?  [place]   Does the patient have one of the following disease processes/diagnoses(primary or secondary)?  COVID-19   COVID-19 underlying condition?  None   Call Number  Call 2   Week 1 Call successful?  Yes   Call start time  0916   Call end time  0928   Discharge diagnosis  COVID 19/AF   Medication alerts for this patient  has gotten meds. feels  drained   Meds reviewed with patient/caregiver?  Yes   Is the patient having any side effects they believe may be caused by any medication additions or changes?  No   Does the patient have all medications ordered at discharge?  Yes   Is the patient taking all medications as directed (includes completed medication regime)?  Yes   Comments regarding appointments  will be calling for an appointment   Does the patient have a primary care provider?   Yes   Has the patient kept scheduled appointments due by today?  N/A   Comments  will be making her appiontment   Did the patient receive a copy of their discharge instructions?  Yes   Did the patient receive a copy of COVID-19 specific instructions?  Yes   Nursing interventions  Reviewed instructions with patient   What is the patient's perception of their health status since discharge?  Same [feels tired]   Does the patient have any of the following symptoms?  None   Pulse Ox monitoring  None   Is the patient/caregiver able to teach back steps to recovery at home?  Set small, achievable goals for return to baseline health, Rest and rebuild strength, gradually increase activity, Eat a well-balance diet [discussed  with the patient]   If the patient is a current smoker, are they able to teach back resources for cessation?  Not a smoker   Is the patient/caregiver able to teach back the hierarchy of who to call/visit for symptoms/problems? PCP, Specialist, Home health nurse, Urgent Care, ED, 911  Yes   COVID-19 call completed?  Yes   Wrap up  additional comments  complains of fatigue, patient is a NICU nurse          Briseida Thacker RN

## 2021-01-05 ENCOUNTER — READMISSION MANAGEMENT (OUTPATIENT)
Dept: CALL CENTER | Facility: HOSPITAL | Age: 41
End: 2021-01-05

## 2021-01-05 NOTE — OUTREACH NOTE
COVID-19 Week 1 Survey      Responses   [facility] patient discharged from?  [place]   Does the patient have one of the following disease processes/diagnoses(primary or secondary)?  COVID-19   COVID-19 underlying condition?  None   Call Number  Call 3   Week 1 Call successful?  No   Discharge diagnosis  COVID 19/AF          Kate Moore LPN

## 2021-01-10 ENCOUNTER — READMISSION MANAGEMENT (OUTPATIENT)
Dept: CALL CENTER | Facility: HOSPITAL | Age: 41
End: 2021-01-10

## 2021-01-10 NOTE — OUTREACH NOTE
COVID-19 Week 2 Survey      Responses   [facility] [place] patient discharged from?  [place]   Does the patient have one of the following disease processes/diagnoses(primary or secondary)?  COVID-19   COVID-19 underlying condition?  None   Call Number  Call 1   COVID-19 Week 2: Call 1 attempt successful?  Yes   Call start time  1238   Call end time  1243   Discharge diagnosis  COVID 19/AF   Is the patient taking all medications as directed (includes completed medication regime)?  Yes   Has the patient kept scheduled appointments due by today?  N/A   What is the patient's perception of their health status since discharge?  Improving   Does the patient have any of the following symptoms?  None   Nursing Interventions  Nurse provided patient education   Pulse Ox monitoring  None   Is the patient/caregiver able to teach back steps to recovery at home?  Rest and rebuild strength, gradually increase activity   Is the patient/caregiver able to teach back the hierarchy of who to call/visit for symptoms/problems? PCP, Specialist, Home health nurse, Urgent Care, ED, 911  Yes   COVID-19 call completed?  Yes   Wrap up additional comments  alot of fatigue. Went back to work last week. Enc low CHO diet and bone broth and protein for good glucose control          Dalia Dill, COY

## 2021-01-12 ENCOUNTER — TELEPHONE (OUTPATIENT)
Dept: CARDIOLOGY | Facility: CLINIC | Age: 41
End: 2021-01-12

## 2021-01-12 ENCOUNTER — OFFICE VISIT (OUTPATIENT)
Dept: INTERNAL MEDICINE | Facility: CLINIC | Age: 41
End: 2021-01-12

## 2021-01-12 VITALS
RESPIRATION RATE: 18 BRPM | HEIGHT: 69 IN | SYSTOLIC BLOOD PRESSURE: 142 MMHG | TEMPERATURE: 97.7 F | OXYGEN SATURATION: 98 % | DIASTOLIC BLOOD PRESSURE: 92 MMHG | WEIGHT: 293 LBS | HEART RATE: 73 BPM | BODY MASS INDEX: 43.4 KG/M2

## 2021-01-12 DIAGNOSIS — E66.01 MORBID OBESITY WITH BMI OF 50.0-59.9, ADULT (HCC): Chronic | ICD-10-CM

## 2021-01-12 DIAGNOSIS — I48.0 PAROXYSMAL ATRIAL FIBRILLATION (HCC): Chronic | ICD-10-CM

## 2021-01-12 DIAGNOSIS — E11.9 TYPE 2 DIABETES MELLITUS WITHOUT COMPLICATION, WITHOUT LONG-TERM CURRENT USE OF INSULIN (HCC): Chronic | ICD-10-CM

## 2021-01-12 DIAGNOSIS — I48.0 PAROXYSMAL ATRIAL FIBRILLATION (HCC): Primary | ICD-10-CM

## 2021-01-12 DIAGNOSIS — I10 ESSENTIAL HYPERTENSION: Chronic | ICD-10-CM

## 2021-01-12 DIAGNOSIS — Z76.89 ENCOUNTER FOR SUPPORT AND COORDINATION OF TRANSITION OF CARE: Primary | ICD-10-CM

## 2021-01-12 DIAGNOSIS — Z91.89 AT RISK FOR SLEEP APNEA: ICD-10-CM

## 2021-01-12 PROCEDURE — 99495 TRANSJ CARE MGMT MOD F2F 14D: CPT | Performed by: NURSE PRACTITIONER

## 2021-01-12 RX ORDER — LISINOPRIL 5 MG/1
5 TABLET ORAL DAILY
Qty: 30 TABLET | Refills: 3 | Status: SHIPPED | OUTPATIENT
Start: 2021-01-12 | End: 2021-02-19 | Stop reason: SDUPTHER

## 2021-01-12 NOTE — PROGRESS NOTES
Transitional Care Follow Up Visit  Subjective     Fuad Nielsen is a 40 y.o. female who presents for a transitional care management visit.    Within 48 business hours after discharge our office contacted her via telephone to coordinate her care and needs.      I reviewed and discussed the details of that call along with the discharge summary, hospital problems, inpatient lab results, inpatient diagnostic studies, and consultation reports with Fuad.     Current outpatient and discharge medications have been reconciled for the patient.  Reviewed by: FAMILIA Galaviz      Date of TCM Phone Call 1/2/2021   [facility]   Date of Admission 1/1/2021   Date of Discharge 1/2/2021   Discharge Disposition Home or Self Care     Risk for Readmission (LACE) Score: 5 (1/2/2021  6:00 AM)      History of Present Illness   Course During Hospital Stay:  Fuad is a 40-year-old -American female who is here for a follow-up after a hospitalization at [facility].  She does work as a nurse in the NICU.  She was diagnosed with COVID-19 on 12/21/2020 after having some facial pain and headache.  She had an improvement of her symptoms and was released back to work from the health department on 12/30/2020.  She was sitting at home on January 1, 2021 and noticed that she was having some palpitations.  She got up to go upstairs and heart rate got really high to where she was short of breath so she ultimately went to the emergency department.  In the ER she was noted to be in atrial fibrillation.  She has had no history of A. fib.  She was anticoagulated with Lovenox and given hydralazine for elevated blood pressure in the emergency department and then was admitted to hospitalist services.  She was evaluated by cardiology, Dr. Harris, who recommended starting on low-dose beta-blocker with metoprolol and adding Eliquis.  She had an echocardiogram while she was hospitalized which looked okay.   She converted to a normal sinus rhythm on 1/2/2021 without intravenous medications or cardioversion.  She has planned follow-up with Dr. Harris on 2/5/2021.  She was supposed to have a 14-day Holter mailed to her prior to this appointment but she has not received this yet.  During her hospitalization her labs revealed her hemoglobin A1c had slightly increased to 6.2%.  She is not currently on any antidiabetic agents and is trying to work on diet and exercise for weight reduction.  She is currently on amlodipine and metoprolol but her blood pressure is still slightly elevated with those.  BP is 142/92 in office today.  She was historically on lisinopril years ago and cannot recall why she is no longer on it.  Labs from her hospitalization have been reviewed.      Since she was discharged she has felt fairly well.  She does still have some mild headache and mild shortness of breath with exertion but has not had any further palpitations or racing heart sensation.    She does have hypertension, type 2 diabetes, and obesity.  She was also diagnosed about 10 years ago with mild sleep apnea but has not used CPAP therapy for this and does not follow with sleep medicine.    Results for orders placed or performed during the hospital encounter of 01/01/21   COVID-19 and FLU A/B PCR - Swab, Nasopharynx    Specimen: Nasopharynx; Swab   Result Value Ref Range    COVID19 Detected (C) Not Detected - Ref. Range    Influenza A PCR Not Detected Not Detected    Influenza B PCR Not Detected Not Detected   Comprehensive Metabolic Panel    Specimen: Blood   Result Value Ref Range    Glucose 92 65 - 99 mg/dL    BUN 6 6 - 20 mg/dL    Creatinine 0.83 0.57 - 1.00 mg/dL    Sodium 140 136 - 145 mmol/L    Potassium 4.3 3.5 - 5.2 mmol/L    Chloride 105 98 - 107 mmol/L    CO2 26.0 22.0 - 29.0 mmol/L    Calcium 9.4 8.6 - 10.5 mg/dL    Total Protein 7.7 6.0 - 8.5 g/dL    Albumin 4.10 3.50 - 5.20 g/dL    ALT (SGPT) 17 1 - 33 U/L    AST (SGOT) 21 1 -  32 U/L    Alkaline Phosphatase 88 39 - 117 U/L    Total Bilirubin 0.3 0.0 - 1.2 mg/dL    eGFR  African Amer 93 >60 mL/min/1.73    Globulin 3.6 gm/dL    A/G Ratio 1.1 g/dL    BUN/Creatinine Ratio 7.2 7.0 - 25.0    Anion Gap 9.0 5.0 - 15.0 mmol/L   Magnesium    Specimen: Blood   Result Value Ref Range    Magnesium 2.1 1.6 - 2.6 mg/dL   Troponin    Specimen: Blood   Result Value Ref Range    Troponin T <0.010 0.000 - 0.030 ng/mL   TSH    Specimen: Blood   Result Value Ref Range    TSH 2.110 0.270 - 4.200 uIU/mL   BNP    Specimen: Blood   Result Value Ref Range    proBNP 43.5 0.0 - 450.0 pg/mL   CBC Auto Differential    Specimen: Blood   Result Value Ref Range    WBC 6.49 3.40 - 10.80 10*3/mm3    RBC 4.95 3.77 - 5.28 10*6/mm3    Hemoglobin 12.5 12.0 - 15.9 g/dL    Hematocrit 41.2 34.0 - 46.6 %    MCV 83.2 79.0 - 97.0 fL    MCH 25.3 (L) 26.6 - 33.0 pg    MCHC 30.3 (L) 31.5 - 35.7 g/dL    RDW 14.8 12.3 - 15.4 %    RDW-SD 45.1 37.0 - 54.0 fl    MPV 10.6 6.0 - 12.0 fL    Platelets 289 140 - 450 10*3/mm3    Neutrophil % 37.7 (L) 42.7 - 76.0 %    Lymphocyte % 50.2 (H) 19.6 - 45.3 %    Monocyte % 8.9 5.0 - 12.0 %    Eosinophil % 2.5 0.3 - 6.2 %    Basophil % 0.5 0.0 - 1.5 %    Immature Grans % 0.2 0.0 - 0.5 %    Neutrophils, Absolute 2.45 1.70 - 7.00 10*3/mm3    Lymphocytes, Absolute 3.26 (H) 0.70 - 3.10 10*3/mm3    Monocytes, Absolute 0.58 0.10 - 0.90 10*3/mm3    Eosinophils, Absolute 0.16 0.00 - 0.40 10*3/mm3    Basophils, Absolute 0.03 0.00 - 0.20 10*3/mm3    Immature Grans, Absolute 0.01 0.00 - 0.05 10*3/mm3    nRBC 0.0 0.0 - 0.2 /100 WBC   Urinalysis With Microscopic If Indicated (No Culture) - Urine, Clean Catch    Specimen: Urine, Clean Catch   Result Value Ref Range    Color, UA Yellow Yellow, Straw    Appearance, UA Cloudy (A) Clear    pH, UA 7.0 5.0 - 8.0    Specific Gravity, UA <=1.005 1.001 - 1.030    Glucose, UA Negative Negative    Ketones, UA Negative Negative    Bilirubin, UA Negative Negative    Blood, UA  Negative Negative    Protein, UA Negative Negative    Leuk Esterase, UA Negative Negative    Nitrite, UA Negative Negative    Urobilinogen, UA 0.2 E.U./dL 0.2 - 1.0 E.U./dL   Urinalysis, Microscopic Only - Urine, Clean Catch    Specimen: Urine, Clean Catch   Result Value Ref Range    RBC, UA None Seen None Seen, 0-2 /HPF    WBC, UA 0-2 None Seen, 0-2 /HPF    Bacteria, UA None Seen None Seen, Trace /HPF    Squamous Epithelial Cells, UA 0-2 None Seen, 0-2 /HPF    Hyaline Casts, UA None Seen 0 - 6 /LPF    Methodology Automated Microscopy    CBC Auto Differential    Specimen: Blood   Result Value Ref Range    WBC 6.52 3.40 - 10.80 10*3/mm3    RBC 4.80 3.77 - 5.28 10*6/mm3    Hemoglobin 12.0 12.0 - 15.9 g/dL    Hematocrit 39.3 34.0 - 46.6 %    MCV 81.9 79.0 - 97.0 fL    MCH 25.0 (L) 26.6 - 33.0 pg    MCHC 30.5 (L) 31.5 - 35.7 g/dL    RDW 14.9 12.3 - 15.4 %    RDW-SD 44.6 37.0 - 54.0 fl    MPV 11.0 6.0 - 12.0 fL    Platelets 280 140 - 450 10*3/mm3    Neutrophil % 28.4 (L) 42.7 - 76.0 %    Lymphocyte % 61.7 (H) 19.6 - 45.3 %    Monocyte % 6.6 5.0 - 12.0 %    Eosinophil % 2.5 0.3 - 6.2 %    Basophil % 0.6 0.0 - 1.5 %    Immature Grans % 0.2 0.0 - 0.5 %    Neutrophils, Absolute 1.86 1.70 - 7.00 10*3/mm3    Lymphocytes, Absolute 4.02 (H) 0.70 - 3.10 10*3/mm3    Monocytes, Absolute 0.43 0.10 - 0.90 10*3/mm3    Eosinophils, Absolute 0.16 0.00 - 0.40 10*3/mm3    Basophils, Absolute 0.04 0.00 - 0.20 10*3/mm3    Immature Grans, Absolute 0.01 0.00 - 0.05 10*3/mm3    nRBC 0.0 0.0 - 0.2 /100 WBC   Basic Metabolic Panel    Specimen: Blood   Result Value Ref Range    Glucose 96 65 - 99 mg/dL    BUN 5 (L) 6 - 20 mg/dL    Creatinine 0.75 0.57 - 1.00 mg/dL    Sodium 139 136 - 145 mmol/L    Potassium 3.6 3.5 - 5.2 mmol/L    Chloride 103 98 - 107 mmol/L    CO2 25.0 22.0 - 29.0 mmol/L    Calcium 8.9 8.6 - 10.5 mg/dL    eGFR  African Amer 104 >60 mL/min/1.73    BUN/Creatinine Ratio 6.7 (L) 7.0 - 25.0    Anion Gap 11.0 5.0 - 15.0 mmol/L    Hemoglobin A1c    Specimen: Blood   Result Value Ref Range    Hemoglobin A1C 6.20 (H) 4.80 - 5.60 %   Scan Slide    Specimen: Blood   Result Value Ref Range    RBC Morphology Normal Normal    WBC Morphology Normal Normal    Platelet Morphology Normal Normal   POCT pregnancy, urine    Specimen: Urine   Result Value Ref Range    HCG, Urine, QL Negative Negative    Lot Number [id number]     Internal Positive Control Positive     Internal Negative Control Negative    POC Glucose Once    Specimen: Blood   Result Value Ref Range    Glucose 137 (H) 70 - 130 mg/dL   POC Glucose Once    Specimen: Blood   Result Value Ref Range    Glucose 163 (H) 70 - 130 mg/dL   ECG 12 Lead   Result Value Ref Range    QT Interval 348 ms    QTC Interval 421 ms   Adult Transthoracic Echo Complete W/ Cont if Necessary Per Protocol   Result Value Ref Range    BSA 2.7 m^2    IVSd 1.1 cm    LVIDd 4.8 cm    LVIDs 3.2 cm    LVPWd 1.3 cm    IVS/LVPW 0.81     FS 33.3 %    EDV(Teich) 108.7 ml    ESV(Teich) 41.4 ml    EF(Teich) 61.9 %    EDV(cubed) 112.1 ml    ESV(cubed) 33.2 ml    EF(cubed) 70.4 %    LV mass(C)d 222.8 grams    LV mass(C)dI 82.8 grams/m^2    SV(Teich) 67.2 ml    SI(Teich) 25.0 ml/m^2    SV(cubed) 78.9 ml    SI(cubed) 29.3 ml/m^2    Ao root diam 2.9 cm    Ao root area 6.6 cm^2    LA dimension 3.2 cm    LA/Ao 1.1     LVOT diam 2.0 cm    LVOT area 3.1 cm^2    LVOT area(traced) 3.1 cm^2    LAd major 4.7 cm    LVLd ap4 7.9 cm    EDV(MOD-sp4) 82.0 ml    LVLs ap4 6.2 cm    ESV(MOD-sp4) 28.0 ml    EF(MOD-sp4) 65.9 %    SV(MOD-sp4) 54.0 ml    SI(MOD-sp4) 20.1 ml/m^2    Ao root area (BSA corrected) 1.1     LV Day Vol (BSA corrected) 30.5 ml/m^2    LV Sys Vol (BSA corrected) 10.4 ml/m^2    MV E max ely 47.6 cm/sec    MV A max ely 47.6 cm/sec    MV E/A 1.0     MV V2 max 81.6 cm/sec    MV max PG 2.7 mmHg    MV V2 mean 62.9 cm/sec    MV mean PG 1.7 mmHg    MV V2 VTI 18.6 cm    MVA(VTI) 2.6 cm^2    MV dec time 0.2 sec    Ao pk ely 151.3 cm/sec     Ao max PG 9.2 mmHg    Ao max PG (full) 7.3 mmHg    Ao V2 mean 99.0 cm/sec    Ao mean PG 4.6 mmHg    Ao mean PG (full) 3.5 mmHg    Ao V2 VTI 33.8 cm    ALMA(I,A) 1.4 cm^2    ALMA(I,D) 1.4 cm^2    ALMA(V,A) 1.4 cm^2    ALMA(V,D) 1.4 cm^2    LV V1 max PG 1.8 mmHg    LV V1 mean PG 1.1 mmHg    LV V1 max 67.5 cm/sec    LV V1 mean 48.4 cm/sec    LV V1 VTI 15.8 cm    SV(Ao) 223.7 ml    SI(Ao) 83.1 ml/m^2    SV(LVOT) 48.5 ml    SI(LVOT) 18.0 ml/m^2    PA V2 max 99.3 cm/sec    PA max PG 3.9 mmHg    PA acc slope 1,682 cm/sec^2    PA acc time 0.05 sec    PA pr(Accel) 56.8 mmHg     CV ECHO CHUY - BZI_BMI 55.1 kilograms/m^2     CV ECHO CHUY - BSA(HAYCOCK) 3.0 m^2     CV ECHO CHUY - BZI_METRIC_WEIGHT 169.2 kg     CV ECHO CHUY - BZI_METRIC_HEIGHT 175.3 cm    Target HR (85%) 154 bpm    Max. Pred. HR (100%) 181 bpm    LA Volume Index 16.0 mL/m2    EF(MOD-bp) 60 %   Light Blue Top   Result Value Ref Range    Extra Tube hold for add-on    Green Top (Gel)   Result Value Ref Range    Extra Tube Hold for add-ons.    Lavender Top   Result Value Ref Range    Extra Tube hold for add-on    Gold Top - SST   Result Value Ref Range    Extra Tube Hold for add-ons.             The following portions of the patient's history were reviewed and updated as appropriate: allergies, current medications, past family history, past medical history, past social history, past surgical history and problem list.    Review of Systems  As noted in HPI  Objective   Physical Exam  Vitals signs and nursing note reviewed.   Constitutional:       General: She is not in acute distress.     Appearance: Normal appearance. She is well-developed. She is not diaphoretic.   HENT:      Head: Normocephalic and atraumatic.   Eyes:      Conjunctiva/sclera: Conjunctivae normal.      Pupils: Pupils are equal, round, and reactive to light.   Neck:      Musculoskeletal: Normal range of motion.      Vascular: No JVD.   Cardiovascular:      Rate and Rhythm: Normal rate and regular  rhythm.  No extrasystoles are present.     Chest Wall: PMI is not displaced. No thrill.      Pulses:           Dorsalis pedis pulses are 2+ on the right side and 2+ on the left side.        Posterior tibial pulses are 2+ on the right side and 2+ on the left side.      Heart sounds: Normal heart sounds. No murmur. No friction rub.   Pulmonary:      Effort: Pulmonary effort is normal. No respiratory distress.      Breath sounds: Normal breath sounds.   Chest:      Chest wall: No tenderness.   Abdominal:      General: Bowel sounds are normal. There is no distension.      Palpations: Abdomen is soft.      Tenderness: There is no abdominal tenderness.   Musculoskeletal: Normal range of motion.      Right lower leg: No edema.      Left lower leg: No edema.   Skin:     General: Skin is warm and dry.      Coloration: Skin is not pale.      Findings: No erythema or rash.   Neurological:      Mental Status: She is alert and oriented to person, place, and time.      Coordination: Coordination normal.   Psychiatric:         Behavior: Behavior normal.         Thought Content: Thought content normal.         Judgment: Judgment normal.         Assessment/Plan   Diagnoses and all orders for this visit:    1. Encounter for support and coordination of transition of care (Primary)    2. Essential hypertension  Assessment & Plan:  Hypertension is Uncontrolled.  Dietary sodium restriction.  Weight loss.  Medication changes per orders.  Add lisinopril.  Continue amlodipine 10 mg and metoprolol 25 mg twice daily  Blood pressure will be reassessed at the next regular appointment.    Orders:  -     Ambulatory Referral to Sleep Medicine  -     lisinopril (PRINIVIL,ZESTRIL) 5 MG tablet; Take 1 tablet by mouth Daily.  Dispense: 30 tablet; Refill: 3    3. Type 2 diabetes mellitus without complication, without long-term current use of insulin (CMS/[facility])  Assessment & Plan:  Diabetes is Controlled.   Continue current treatment regimen.  Dietary  recommendations for ADA diet.  Regular aerobic exercise.  Diabetes will be reassessed Next regular follow-up.    Orders:  -     Ambulatory Referral to Sleep Medicine    4. Paroxysmal atrial fibrillation (CMS/[facility])  Assessment & Plan:  New onset 1/1/2021.  She was symptomatic at onset.  She has had no recurrent symptoms and converted back to normal sinus rhythm during her hospitalization.  She will continue metoprolol 25 mg twice daily and Eliquis 5 mg.  She will call cardiology to inquire about Holter since she has not received this yet.  We will follow-up with cardiology as planned as well.    Orders:  -     Ambulatory Referral to Sleep Medicine    5. At risk for sleep apnea  Assessment & Plan:  She does snore, is morbidly obese and has hypertension, and atrial fibrillation.  Will refer to sleep medicine for further evaluation and possible treatment with CPAP therapy.  Encouraged weight loss and BP control.    Orders:  -     Ambulatory Referral to Sleep Medicine    6. Morbid obesity with BMI of 50.0-59.9, adult (CMS/[facility])  Assessment & Plan:  Obesity is Waxing and waning.  Discussed the patient's BMI.  The BMI is above average; BMI management plan is completed.  General weight loss/lifestyle modification strategies discussed (elicit support from others; identify saboteurs; non-food rewards, etc).    Contact information for Dr. Harris's office to provide to patients that she can call and inquire about the status of her Holter monitor.        Return in about 1 month (around 2/12/2021) for Recheck.  I discussed the findings and my recommendations with patient.  Patient was encouraged to keep me informed of any acute changes, lack of improvement, or any new concerning symptoms.  * Please note that portions of this note were completed with a voice recognition program. Efforts were made to edit the dictation but occasionally words are erroneously transcribed.

## 2021-01-17 PROBLEM — I48.91 A-FIB (HCC): Chronic | Status: ACTIVE | Noted: 2021-01-01

## 2021-01-17 PROBLEM — E66.01 MORBID OBESITY WITH BMI OF 50.0-59.9, ADULT: Chronic | Status: ACTIVE | Noted: 2020-01-27

## 2021-01-17 PROBLEM — D50.8 IRON DEFICIENCY ANEMIA SECONDARY TO INADEQUATE DIETARY IRON INTAKE: Chronic | Status: ACTIVE | Noted: 2020-01-27

## 2021-01-17 PROBLEM — I10 ESSENTIAL HYPERTENSION: Chronic | Status: ACTIVE | Noted: 2020-01-27

## 2021-01-17 PROBLEM — I10 ESSENTIAL HYPERTENSION: Chronic | Status: ACTIVE | Noted: 2021-01-01

## 2021-01-17 PROBLEM — Z91.89 AT RISK FOR SLEEP APNEA: Status: ACTIVE | Noted: 2021-01-17

## 2021-01-17 PROBLEM — I10 ESSENTIAL HYPERTENSION: Chronic | Status: RESOLVED | Noted: 2020-01-27 | Resolved: 2021-01-17

## 2021-01-17 PROBLEM — E53.8 B12 DEFICIENCY: Chronic | Status: ACTIVE | Noted: 2020-01-27

## 2021-01-17 PROBLEM — D25.9 UTERINE LEIOMYOMA: Chronic | Status: ACTIVE | Noted: 2020-01-27

## 2021-01-17 PROBLEM — E11.9 TYPE 2 DIABETES MELLITUS WITHOUT COMPLICATION, WITHOUT LONG-TERM CURRENT USE OF INSULIN: Chronic | Status: ACTIVE | Noted: 2020-01-27

## 2021-01-17 PROBLEM — G56.03 BILATERAL CARPAL TUNNEL SYNDROME: Chronic | Status: ACTIVE | Noted: 2020-01-27

## 2021-01-17 NOTE — ASSESSMENT & PLAN NOTE
Diabetes is Controlled.   Continue current treatment regimen.  Dietary recommendations for ADA diet.  Regular aerobic exercise.  Diabetes will be reassessed Next regular follow-up.

## 2021-01-17 NOTE — ASSESSMENT & PLAN NOTE
Hypertension is Uncontrolled.  Dietary sodium restriction.  Weight loss.  Medication changes per orders.  Add lisinopril.  Continue amlodipine 10 mg and metoprolol 25 mg twice daily  Blood pressure will be reassessed at the next regular appointment.

## 2021-01-17 NOTE — ASSESSMENT & PLAN NOTE
New onset 1/1/2021.  She was symptomatic at onset.  She has had no recurrent symptoms and converted back to normal sinus rhythm during her hospitalization.  She will continue metoprolol 25 mg twice daily and Eliquis 5 mg.  She will call cardiology to inquire about Holter since she has not received this yet.  We will follow-up with cardiology as planned as well.

## 2021-01-17 NOTE — ASSESSMENT & PLAN NOTE
Obesity is Waxing and waning.  Discussed the patient's BMI.  The BMI is above average; BMI management plan is completed.  General weight loss/lifestyle modification strategies discussed (elicit support from others; identify saboteurs; non-food rewards, etc).

## 2021-01-17 NOTE — ASSESSMENT & PLAN NOTE
She does snore, is morbidly obese and has hypertension, and atrial fibrillation.  Will refer to sleep medicine for further evaluation and possible treatment with CPAP therapy.  Encouraged weight loss and BP control.

## 2021-02-05 ENCOUNTER — OFFICE VISIT (OUTPATIENT)
Dept: CARDIOLOGY | Facility: CLINIC | Age: 41
End: 2021-02-05

## 2021-02-05 VITALS
HEIGHT: 69 IN | WEIGHT: 293 LBS | HEART RATE: 99 BPM | OXYGEN SATURATION: 99 % | DIASTOLIC BLOOD PRESSURE: 72 MMHG | BODY MASS INDEX: 43.4 KG/M2 | SYSTOLIC BLOOD PRESSURE: 120 MMHG

## 2021-02-05 DIAGNOSIS — E66.01 MORBID OBESITY WITH BMI OF 50.0-59.9, ADULT (HCC): Chronic | ICD-10-CM

## 2021-02-05 DIAGNOSIS — I48.0 PAROXYSMAL ATRIAL FIBRILLATION (HCC): Primary | Chronic | ICD-10-CM

## 2021-02-05 DIAGNOSIS — U07.1 COVID-19: ICD-10-CM

## 2021-02-05 DIAGNOSIS — E11.9 TYPE 2 DIABETES MELLITUS WITHOUT COMPLICATION, WITHOUT LONG-TERM CURRENT USE OF INSULIN (HCC): Chronic | ICD-10-CM

## 2021-02-05 DIAGNOSIS — I10 ESSENTIAL HYPERTENSION: Chronic | ICD-10-CM

## 2021-02-05 PROCEDURE — 99214 OFFICE O/P EST MOD 30 MIN: CPT | Performed by: INTERNAL MEDICINE

## 2021-02-05 RX ORDER — ASPIRIN 81 MG/1
81 TABLET ORAL DAILY
Qty: 90 TABLET | Refills: 3
Start: 2021-02-05 | End: 2021-08-23 | Stop reason: SDUPTHER

## 2021-02-05 NOTE — PROGRESS NOTES
OFFICE VISIT  NOTE  [facility] CARDIOLOGY      Name: Fuad Nielsen    Date: 2021  MRN:  [id number]  :  1980      REFERRING/PRIMARY PROVIDER:  Mandie Tobar APRN    Chief Complaint   Patient presents with   • Atrial Fibrillation       HPI: Fuad Nielsen is a 40 y.o. female who presents today for atrial fibrillation.  History of morbid obesity, BMI 53, hypertension, COVID-19 infection 2020, new diagnosis of PAF 2021.  Occasional palpitations after discharge but benign, minimal.  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  No significant shortness of breath or chest pain.  No bleeding complications with Eliquis.    Past Medical History:   Diagnosis Date   • Abnormal Pap smear of cervix    • Anemia    • Diabetes (CMS/HCC) 2009    diagnosed in  and was normal in    • Fibroids     uterine   • Hypertension        Past Surgical History:   Procedure Laterality Date   •  SECTION  2018   • COLPOSCOPY W/ BIOPSY / CURETTAGE     • GASTRIC SLEEVE LAPAROSCOPIC  10/2012   • OOPHORECTOMY Right 1999   • WISDOM TOOTH EXTRACTION         Social History     Socioeconomic History   • Marital status: Single     Spouse name: Not on file   • Number of children: Not on file   • Years of education: Not on file   • Highest education level: Not on file   Tobacco Use   • Smoking status: Former Smoker     Types: Cigarettes     Quit date: 2018     Years since quittin.5   • Smokeless tobacco: Never Used   Substance and Sexual Activity   • Alcohol use: Yes     Frequency: 2-4 times a month     Drinks per session: 1 or 2     Comment: 1-2 per month   • Drug use: Never   • Sexual activity: Yes     Partners: Male     Birth control/protection: Nexplanon       Family History   Problem Relation Age of Onset   • Obesity Mother    • Hypertension Mother         ROS:   Constitutional no fever,  no weight loss   Skin no rash, no subcutaneous nodules  "  Otolaryngeal no difficulty swallowing   Cardiovascular See HPI   Pulmonary no cough, no sputum production   Gastrointestinal no constipation, no diarrhea   Genitourinary no dysuria, no hematuria   Hematologic no easy bruisability, no abnormal bleeding   Musculoskeletal no muscle pain   Neurologic no dizziness, no falls         No Known Allergies      Current Outpatient Medications:   •  amLODIPine (NORVASC) 10 MG tablet, Take 1 tablet by mouth Daily., Disp: 30 tablet, Rfl: 6  •  Blood Glucose Monitoring Suppl (FREESTYLE FREEDOM LITE) w/Device kit, Use as directed to test blood sugar once daily, Disp: 1 each, Rfl: 0  •  Etonogestrel (NEXPLANON SC), Inject  under the skin into the appropriate area as directed., Disp: , Rfl:   •  glucose blood test strip, Use as directed to test blood sugar once Daily., Disp: 100 each, Rfl: 11  •  glucose monitor monitoring kit, 1 each Daily., Disp: 1 each, Rfl: 0  •  Lancets misc, Use as directed to test blood sugar once Daily., Disp: 100 each, Rfl: 11  •  lisinopril (PRINIVIL,ZESTRIL) 5 MG tablet, Take 1 tablet by mouth Daily., Disp: 30 tablet, Rfl: 3  •  metoprolol tartrate (LOPRESSOR) 25 MG tablet, Take 0.5 tablets by mouth Every 12 (Twelve) Hours., Disp: 30 tablet, Rfl: 2  •  naproxen (NAPROSYN) 500 MG tablet, Take 1 tablet by mouth 2 (Two) Times a Day As Needed for Mild Pain With food, Disp: 60 tablet, Rfl: 3  •  aspirin (aspirin) 81 MG EC tablet, Take 1 tablet by mouth Daily., Disp: 90 tablet, Rfl: 3    Vitals:    02/05/21 0857   BP: 120/72   BP Location: Right arm   Patient Position: Sitting   Pulse: 99   SpO2: 99%   Weight: (!) 164 kg (362 lb)   Height: 175.3 cm (69\")     Body mass index is 53.46 kg/m².    PHYSICAL EXAM:    General Appearance:   · well developed  · well nourished  HENT:   · oropharynx moist  · lips not cyanotic  Neck:  · thyroid not enlarged  · supple  Respiratory:  · no respiratory distress  · normal breath sounds  · no rales  Cardiovascular:  · no jugular " venous distention  · regular rhythm  · apical impulse normal  · S1 normal, S2 normal  · no S3, no S4   · no murmur  · no rub, no thrill  · carotid pulses normal; no bruit  · pedal pulses normal  · lower extremity edema: none    Gastrointestinal:   · bowel sounds normal  · non-tender  · no hepatomegaly, no splenomegaly  Musculoskeletal:  · no clubbing of fingers.   · normocephalic, head atraumatic  Skin:   · warm, dry  Psychiatric:  · judgement and insight appropriate  · normal mood and affect    RESULTS:   Procedures    Results for orders placed during the hospital encounter of 01/01/21   Adult Transthoracic Echo Complete W/ Cont if Necessary Per Protocol    Narrative · Left ventricular ejection fraction appears to be 61 - 65%. Left   ventricular systolic function is normal.  · Left ventricular diastolic function was normal.  · No significant structural or functional valvular disease.            Labs:  Lab Results   Component Value Date    AST 21 01/01/2021    ALT 17 01/01/2021     Lab Results   Component Value Date    HGBA1C 6.20 (H) 01/02/2021     No components found for: CREATINININE  No results found for: EGFRIFNONA    Most recent PCP note, imaging tests, and labs reviewed.    ASSESSMENT:  Problem List Items Addressed This Visit        Other    Type 2 diabetes mellitus without complication, without long-term current use of insulin (CMS/[facility]) (Chronic)    Morbid obesity with BMI of 50.0-59.9, adult (CMS/[facility]) (Chronic)    A-fib (CMS/[facility]) - Primary (Chronic)    Essential hypertension (Chronic)    COVID-19          PLAN:    1.  PAF:  Normal sinus rhythm today  Holter monitor pending  [id number] score is 1 for hypertension  1 discussion with patient regarding risk benefits alternatives of anticoagulation   At this time she will discontinue Eliquis and start aspirin 81 mg daily.  Continue metoprolol  Echocardiogram 1/2021 benign.    2.  Hypertension:  Long-term goal blood pressure is 130/80  Agree with amlodipine,  lisinopril, metoprolol.  Low-sodium diet and exercise recommended.    3.  COVID-19 infection 12/2020:  Decreased exercise tolerance and palpitations may be sequelae of COVID-19 infection.  Continue exercise and weight loss.    Return to clinic in 6 months, or sooner as needed.    Thank you for the opportunity to share in the care of your patient; please do not hesitate to call me with any questions.     Allen Harris MD, [facility]  Office: [phone] [address] [place] [address]    02/05/21

## 2021-02-15 ENCOUNTER — TELEPHONE (OUTPATIENT)
Dept: CARDIOLOGY | Facility: CLINIC | Age: 41
End: 2021-02-15

## 2021-02-15 NOTE — TELEPHONE ENCOUNTER
----- Message from Allen Harris MD sent at 2/15/2021  2:41 PM EST -----  Please inform the patient of their test results. Thank you.

## 2021-02-19 ENCOUNTER — OFFICE VISIT (OUTPATIENT)
Dept: INTERNAL MEDICINE | Facility: CLINIC | Age: 41
End: 2021-02-19

## 2021-02-19 VITALS
SYSTOLIC BLOOD PRESSURE: 144 MMHG | HEIGHT: 69 IN | BODY MASS INDEX: 43.4 KG/M2 | DIASTOLIC BLOOD PRESSURE: 78 MMHG | RESPIRATION RATE: 18 BRPM | HEART RATE: 81 BPM | OXYGEN SATURATION: 98 % | TEMPERATURE: 97.1 F | WEIGHT: 293 LBS

## 2021-02-19 DIAGNOSIS — I48.0 PAROXYSMAL ATRIAL FIBRILLATION (HCC): ICD-10-CM

## 2021-02-19 DIAGNOSIS — I10 ESSENTIAL HYPERTENSION: Primary | ICD-10-CM

## 2021-02-19 DIAGNOSIS — E11.9 TYPE 2 DIABETES MELLITUS WITHOUT COMPLICATION, WITHOUT LONG-TERM CURRENT USE OF INSULIN (HCC): ICD-10-CM

## 2021-02-19 DIAGNOSIS — E66.01 MORBID OBESITY WITH BMI OF 50.0-59.9, ADULT (HCC): ICD-10-CM

## 2021-02-19 DIAGNOSIS — Z91.89 AT RISK FOR SLEEP APNEA: ICD-10-CM

## 2021-02-19 DIAGNOSIS — Z98.84 HISTORY OF BARIATRIC SURGERY: ICD-10-CM

## 2021-02-19 PROCEDURE — 99214 OFFICE O/P EST MOD 30 MIN: CPT | Performed by: NURSE PRACTITIONER

## 2021-02-19 RX ORDER — AMLODIPINE BESYLATE 10 MG/1
10 TABLET ORAL DAILY
Qty: 30 TABLET | Refills: 6 | Status: SHIPPED | OUTPATIENT
Start: 2021-02-19 | End: 2022-01-11 | Stop reason: SDUPTHER

## 2021-02-19 RX ORDER — LISINOPRIL 10 MG/1
10 TABLET ORAL DAILY
Qty: 30 TABLET | Refills: 3 | Status: SHIPPED | OUTPATIENT
Start: 2021-02-19 | End: 2021-06-29 | Stop reason: SDUPTHER

## 2021-02-19 NOTE — ASSESSMENT & PLAN NOTE
Hypertension is unchanged.  Dietary sodium restriction.  Weight loss.  Regular aerobic exercise.  Increase lisinopril to 10 mg.  Continue metoprolol, and amlodipine  Blood pressure will be reassessed at the next regular appointment.

## 2021-02-19 NOTE — ASSESSMENT & PLAN NOTE
Diabetes is unchanged.   Reminded to bring in blood sugar diary at next visit.  Dietary recommendations for ADA diet.  Regular aerobic exercise.  Diabetes will be reassessed 6 weeks.

## 2021-02-19 NOTE — ASSESSMENT & PLAN NOTE
Regular rate and rhythm noted in office today.  Agree with stopping Eliquis and starting aspirin.  She has not started this yet.  Samples of aspirin provided to patient in office.  She will  additional aspirin and go ahead and get this started.  Continue metoprolol.

## 2021-02-19 NOTE — ASSESSMENT & PLAN NOTE
Patient's (Body mass index is 54.64 kg/m².) indicates that they are morbidly obese (BMI > 40 or > 35 with obesity - related health condition) with obesity-related health conditions that include hypertension and diabetes mellitus . Obesity is worsening. BMI is is above average; BMI management plan is completed. We discussed low calorie, low carb based diet program, portion control, increasing exercise and consulting a Bariatric surgeon.  She does have a history of gastric sleeve with Dr. Goddard.  We will refer her back to him.

## 2021-02-19 NOTE — PROGRESS NOTES
"Chief Complaint  Hypertension, Diabetes (last A1C on 1/2=6.2), and history afib    Subjective          Fuad Nielsen presents to [facility] for   History of Present Illness    Fuad Nielsen is a 40 y.o. female who presents to the office today for follow-up on diabetes, atrial fibrillation, and hypertension.  She did have COVID-19 in December 2020.  She presented to [facility] emergency department on January 1, 2021 where she was noted to have new onset atrial fibrillation.  She was anticoagulated with Lovenox given hydralazine for elevated blood pressure, and evaluated by Dr. Harris.  She was started on a low-dose beta-blocker and Eliquis was added at that time.  Echocardiogram looked okay at that time.  She converted to normal sinus rhythm.  She has since followed up outpatient on 2/5/2021 with Dr. Harris .  Since her last visit with me she has had a normal Holter monitor it was felt that her palpitations/A. fib were directly related to her Covid.  Dr. Harris stopped her Eliquis and put her on low-dose aspirin.  Unfortunately she has not started that aspirin yet.    She is not checking her blood pressure at home.  BP is elevated in office x2 checks in the 140s systolic.  She does not follow a low-sodium diet.  She is having a bit of a headache intermittently post Covid.    She is not checking her her glucose for her type II diabetes.  She is currently on no antidiabetic agents.  Her last hemoglobin A1c was 6.2% on 1/2/2021.She denies any hypoglycemic episodes.  Current diet: in general, an \"unhealthy\" diet,   current exercise: none.   Ace: Yes/lisinopril  Denies  dizziness, visual disturbances, chest pain, dyspnea, polyuria, polyphagia, paraesthesias, and hypoglycemic episodes.   She is not sleeping well right now- related to her son's sleeping habits also.     At her last visit I referred her to sleep medicine for an evaluation of potential sleep apnea.  This does not " "appear to have been scheduled yet.    Weight is trnding up.  She does have a history of bariatric surgery with a sleeve by Dr. Goddard in 2012.  She failed to follow-up with him postoperatively and has been unable to lose weight  Wt Readings from Last 3 Encounters:   02/19/21 (!) 168 kg (370 lb)   02/05/21 (!) 164 kg (362 lb)   01/12/21 (!) 163 kg (360 lb)         No Known Allergies  Current Outpatient Medications on File Prior to Visit   Medication Sig Dispense Refill   • aspirin (aspirin) 81 MG EC tablet Take 1 tablet by mouth Daily. 90 tablet 3   • Blood Glucose Monitoring Suppl (FREESTYLE FREEDOM LITE) w/Device kit Use as directed to test blood sugar once daily 1 each 0   • Etonogestrel (NEXPLANON SC) Inject  under the skin into the appropriate area as directed.     • glucose blood test strip Use as directed to test blood sugar once Daily. 100 each 11   • glucose monitor monitoring kit 1 each Daily. 1 each 0   • Lancets misc Use as directed to test blood sugar once Daily. 100 each 11   • naproxen (NAPROSYN) 500 MG tablet Take 1 tablet by mouth 2 (Two) Times a Day As Needed for Mild Pain With food 60 tablet 3   • [DISCONTINUED] amLODIPine (NORVASC) 10 MG tablet Take 1 tablet by mouth Daily. 30 tablet 6   • [DISCONTINUED] lisinopril (PRINIVIL,ZESTRIL) 5 MG tablet Take 1 tablet by mouth Daily. 30 tablet 3   • [DISCONTINUED] metoprolol tartrate (LOPRESSOR) 25 MG tablet Take 0.5 tablets by mouth Every 12 (Twelve) Hours. 30 tablet 2     No current facility-administered medications on file prior to visit.          The following portions of the patient's history were reviewed and updated as appropriate: allergies, current medications, past family history, past medical history, past social history, past surgical history and problem list and are available for review within electronic record    Objective     Vital Signs:   /78   Pulse 81   Temp 97.1 °F (36.2 °C)   Resp 18   Ht 175.3 cm (69\")   Wt (!) 168 kg (370 " lb)   SpO2 98%   BMI 54.64 kg/m²         Physical Exam     Result Review :     The following data was reviewed by: FAMILIA Enriquez on 02/19/2021:  Common labs    Common Labsle 9/23/20 9/23/20 1/1/21 1/1/21 1/2/21 1/2/21 1/2/21    1354 1450 1831 1831 0327 0327 0327   Glucose    92 96     BUN    6 5 (A)     Creatinine    0.83 0.75     eGFR  Am    93 104     Sodium    140 139     Potassium    4.3 3.6     Chloride    105 103     Calcium    9.4 8.9     Albumin    4.10      Total Bilirubin    0.3      Alkaline Phosphatase    88      AST (SGOT)    21      ALT (SGPT)    17      WBC  5.67 6.49   6.52    Hemoglobin  11.5 (A) 12.5   12.0    Hematocrit  36.2 41.2   39.3    Platelets  268 289   280    Hemoglobin A1C 5.8      6.20 (A)   (A) Abnormal value       Comments are available for some flowsheets but are not being displayed.             Data reviewed: Cardiology studies Echocardiogram, Holter monitor and Consultant notes From Dr. Harris on 2/5/2021              Assessment and Plan      Diagnoses and all orders for this visit:    1. Essential hypertension (Primary)  Assessment & Plan:  Hypertension is unchanged.  Dietary sodium restriction.  Weight loss.  Regular aerobic exercise.  Increase lisinopril to 10 mg.  Continue metoprolol, and amlodipine  Blood pressure will be reassessed at the next regular appointment.    Orders:  -     lisinopril (PRINIVIL,ZESTRIL) 10 MG tablet; Take 1 tablet by mouth Daily.  Dispense: 30 tablet; Refill: 3  -     metoprolol tartrate (LOPRESSOR) 25 MG tablet; Take 0.5 tablets by mouth Every 12 (Twelve) Hours.  Dispense: 30 tablet; Refill: 2  -     amLODIPine (NORVASC) 10 MG tablet; Take 1 tablet by mouth Daily.  Dispense: 30 tablet; Refill: 6    2. Type 2 diabetes mellitus without complication, without long-term current use of insulin (CMS/[facility])  Assessment & Plan:  Diabetes is unchanged.   Reminded to bring in blood sugar diary at next visit.  Dietary recommendations for ADA  diet.  Regular aerobic exercise.  Diabetes will be reassessed 6 weeks.    Orders:  -     lisinopril (PRINIVIL,ZESTRIL) 10 MG tablet; Take 1 tablet by mouth Daily.  Dispense: 30 tablet; Refill: 3    3. Paroxysmal atrial fibrillation (CMS/[facility])  Assessment & Plan:  Regular rate and rhythm noted in office today.  Agree with stopping Eliquis and starting aspirin.  She has not started this yet.  Samples of aspirin provided to patient in office.  She will  additional aspirin and go ahead and get this started.  Continue metoprolol.    Orders:  -     metoprolol tartrate (LOPRESSOR) 25 MG tablet; Take 0.5 tablets by mouth Every 12 (Twelve) Hours.  Dispense: 30 tablet; Refill: 2    4. Morbid obesity with BMI of 50.0-59.9, adult (CMS/[facility])  Assessment & Plan:  Patient's (Body mass index is 54.64 kg/m².) indicates that they are morbidly obese (BMI > 40 or > 35 with obesity - related health condition) with obesity-related health conditions that include hypertension and diabetes mellitus . Obesity is worsening. BMI is is above average; BMI management plan is completed. We discussed low calorie, low carb based diet program, portion control, increasing exercise and consulting a Bariatric surgeon.  She does have a history of gastric sleeve with Dr. Goddard.  We will refer her back to him.     Orders:  -     Ambulatory Referral to Bariatric Surgery    5. History of bariatric surgery  -     Ambulatory Referral to Bariatric Surgery    6. At risk for sleep apnea  She was referred in January and has not had her appointment scheduled yet.  I will have my referral coordinator work on helping to get this scheduled        Follow Up     Patient was given instructions and counseling regarding her condition or for health maintenance advice. Please see specific information pulled into the AVS if appropriate.   Any new medication's adverse effects have been discussed in detail with patient  Patient was encouraged to keep me informed of any  acute changes, lack of improvement, or any new concerning symptoms.    Return in about 6 weeks (around 4/2/2021) for chronic condition follow up.    * Please note that portions of this note were completed with a voice recognition program. Efforts were made to edit the dictation but occasionally words are erroneously transcribed.

## 2021-03-29 ENCOUNTER — OFFICE VISIT (OUTPATIENT)
Dept: INTERNAL MEDICINE | Facility: CLINIC | Age: 41
End: 2021-03-29

## 2021-03-29 ENCOUNTER — LAB (OUTPATIENT)
Dept: LAB | Facility: HOSPITAL | Age: 41
End: 2021-03-29

## 2021-03-29 VITALS
HEART RATE: 83 BPM | OXYGEN SATURATION: 99 % | DIASTOLIC BLOOD PRESSURE: 82 MMHG | TEMPERATURE: 98 F | BODY MASS INDEX: 43.4 KG/M2 | WEIGHT: 293 LBS | HEIGHT: 69 IN | SYSTOLIC BLOOD PRESSURE: 128 MMHG

## 2021-03-29 DIAGNOSIS — I48.0 PAROXYSMAL ATRIAL FIBRILLATION (HCC): ICD-10-CM

## 2021-03-29 DIAGNOSIS — E01.0 THYROMEGALY: ICD-10-CM

## 2021-03-29 DIAGNOSIS — I10 ESSENTIAL HYPERTENSION: ICD-10-CM

## 2021-03-29 DIAGNOSIS — R73.03 PREDIABETES: ICD-10-CM

## 2021-03-29 DIAGNOSIS — Z12.31 ENCOUNTER FOR SCREENING MAMMOGRAM FOR MALIGNANT NEOPLASM OF BREAST: ICD-10-CM

## 2021-03-29 DIAGNOSIS — E66.01 MORBID OBESITY WITH BMI OF 50.0-59.9, ADULT (HCC): Chronic | ICD-10-CM

## 2021-03-29 DIAGNOSIS — E11.9 TYPE 2 DIABETES MELLITUS WITHOUT COMPLICATION, WITHOUT LONG-TERM CURRENT USE OF INSULIN (HCC): Primary | ICD-10-CM

## 2021-03-29 LAB
HBA1C MFR BLD: 6.1 %
T4 FREE SERPL-MCNC: 1.14 NG/DL (ref 0.93–1.7)
TSH SERPL DL<=0.05 MIU/L-ACNC: 1.41 UIU/ML (ref 0.27–4.2)

## 2021-03-29 PROCEDURE — 86800 THYROGLOBULIN ANTIBODY: CPT | Performed by: NURSE PRACTITIONER

## 2021-03-29 PROCEDURE — 99214 OFFICE O/P EST MOD 30 MIN: CPT | Performed by: NURSE PRACTITIONER

## 2021-03-29 PROCEDURE — 84439 ASSAY OF FREE THYROXINE: CPT | Performed by: NURSE PRACTITIONER

## 2021-03-29 PROCEDURE — 36415 COLL VENOUS BLD VENIPUNCTURE: CPT | Performed by: NURSE PRACTITIONER

## 2021-03-29 PROCEDURE — 84443 ASSAY THYROID STIM HORMONE: CPT | Performed by: NURSE PRACTITIONER

## 2021-03-29 PROCEDURE — 86376 MICROSOMAL ANTIBODY EACH: CPT | Performed by: NURSE PRACTITIONER

## 2021-03-29 PROCEDURE — 83036 HEMOGLOBIN GLYCOSYLATED A1C: CPT | Performed by: NURSE PRACTITIONER

## 2021-03-29 NOTE — ASSESSMENT & PLAN NOTE
Patient's (Body mass index is 53.65 kg/m².) indicates that they are morbidly obese (BMI > 40 or > 35 with obesity - related health condition) with obesity-related health conditions that include hypertension, diabetes mellitus and dyslipidemias . Obesity is improving with lifestyle modifications. BMI is is above average; BMI management plan is completed. We discussed portion control, increasing exercise and consulting a Bariatric surgeon.  Encouraged her to go ahead and get her paperwork completed so that she can see bariatric provider.

## 2021-03-29 NOTE — ASSESSMENT & PLAN NOTE
Diabetes is improving with treatment.   Continue current treatment regimen.  Reminded to bring in blood sugar diary at next visit.  Dietary recommendations for ADA diet.  Regular aerobic exercise.  Discussed foot care.  Reminded to get yearly retinal exam.  Diabetes will be reassessed in 3 months.

## 2021-03-29 NOTE — ASSESSMENT & PLAN NOTE
Regular rate and rhythm noted in office.  Agree with aspirin and beta-blocker.  She will continue both of these.

## 2021-03-29 NOTE — PROGRESS NOTES
Chief Complaint  Hypertension (followup) and Diabetes    Subjective          Fuad Nielsen presents to [facility] for   History of Present Illness    Hypertension-currently on lisinopril metoprolol and amlodipine.-lisinopril increased at last visit.  Compliant with dosing and denies any adverse effects.  She is not checking her blood pressure at home.  She denies any headaches, dizziness, chest pain, palpitations, or edema.      She does feel a bit more short of breath  than she has historically.  This is been ongoing since she was diagnosed with Covid in December 2020.  Shortness of breath is gradually improving and is now only with exertion.  She does feel exhausted.  She was previously referred to sleep medicine to get a sleep study.  She has had to cancel and plans on rescheduling.    Diabetes-type II.  She is not checking her glucose at home.  Her last hemoglobin A1c was 6.2% on 1/2/2021.  She denies any hypoglycemic symptoms.  Her diet is generally unhealthy and she is not exercising.  However she has lost 7 pounds since her last visit.      PAF-noted after her Covid diagnosis in December 2020.  This was felt to be more related to Covid and she was anticoagulated for a brief period of time but then Dr. aHrris, cardiology stopped her eliqiis and switched her over to ASA with beta-blocker.  She has done well with this.  She did have an echo in January and that looked okay.     She was referred to bariatric surgery with Dr. Goddard at her last visit.  She has not completed her paperwork to turn into that office to be scheduled.  She plans on completing this later today.     Health maintenance:  She will be 41 soon.   She has never had a mammogram.  She denies any concerning areas but would like to start her routine screenings.      No Known Allergies  Current Outpatient Medications on File Prior to Visit   Medication Sig Dispense Refill   • amLODIPine (NORVASC) 10 MG tablet Take 1  "tablet by mouth Daily. 30 tablet 6   • aspirin (aspirin) 81 MG EC tablet Take 1 tablet by mouth Daily. 90 tablet 3   • Blood Glucose Monitoring Suppl (FREESTYLE FREEDOM LITE) w/Device kit Use as directed to test blood sugar once daily 1 each 0   • Etonogestrel (NEXPLANON SC) Inject  under the skin into the appropriate area as directed.     • glucose blood test strip Use as directed to test blood sugar once Daily. 100 each 11   • Lancets misc Use as directed to test blood sugar once Daily. 100 each 11   • lisinopril (PRINIVIL,ZESTRIL) 10 MG tablet Take 1 tablet by mouth Daily. 30 tablet 3   • metoprolol tartrate (LOPRESSOR) 25 MG tablet Take 0.5 tablets by mouth Every 12 (Twelve) Hours. 30 tablet 2   • naproxen (NAPROSYN) 500 MG tablet Take 1 tablet by mouth 2 (Two) Times a Day As Needed for Mild Pain With food 60 tablet 3   • [DISCONTINUED] glucose monitor monitoring kit 1 each Daily. 1 each 0     No current facility-administered medications on file prior to visit.         The following portions of the patient's history were reviewed and updated as appropriate: allergies, current medications, past family history, past medical history, past social history, past surgical history and problem list and are available for review within electronic record    Objective     Vital Signs:   /82 (BP Location: Right arm, Patient Position: Sitting)   Pulse 83   Temp 98 °F (36.7 °C) (Infrared)   Ht 175.3 cm (69\")   Wt (!) 165 kg (363 lb 4.8 oz)   SpO2 99%   BMI 53.65 kg/m²         Physical Exam  Vitals and nursing note reviewed.   Constitutional:       General: She is not in acute distress.     Appearance: Normal appearance. She is well-developed. She is morbidly obese. She is not ill-appearing, toxic-appearing or diaphoretic.   HENT:      Head: Normocephalic and atraumatic.   Eyes:      Conjunctiva/sclera: Conjunctivae normal.      Pupils: Pupils are equal, round, and reactive to light.   Neck:      Thyroid: Thyromegaly " present. No thyroid mass or thyroid tenderness.      Vascular: Normal carotid pulses. No carotid bruit or JVD.   Cardiovascular:      Rate and Rhythm: Normal rate and regular rhythm.      Pulses:           Dorsalis pedis pulses are 2+ on the right side and 2+ on the left side.        Posterior tibial pulses are 2+ on the right side and 2+ on the left side.      Heart sounds: Normal heart sounds. No murmur heard.     Pulmonary:      Effort: Pulmonary effort is normal. No respiratory distress.      Breath sounds: Normal breath sounds.   Chest:      Chest wall: No tenderness.   Abdominal:      General: Bowel sounds are normal. There is no distension.      Palpations: Abdomen is soft.      Tenderness: There is no abdominal tenderness.   Musculoskeletal:         General: Normal range of motion.      Cervical back: Normal range of motion.   Skin:     General: Skin is warm and dry.      Coloration: Skin is not pale.      Findings: No erythema or rash.   Neurological:      Mental Status: She is alert and oriented to person, place, and time.      Coordination: Coordination normal.   Psychiatric:         Behavior: Behavior normal.         Thought Content: Thought content normal.         Judgment: Judgment normal.          Result Review :     The following data was reviewed by: FAMILIA Enriquez on 03/29/2021:  CMP    CMP 6/17/20 1/1/21 1/2/21   Glucose 74 92 96   BUN 7 6 5 (A)   Creatinine 0.91 0.83 0.75   eGFR African Am 83 93 104   Sodium 140 140 139   Potassium 4.0 4.3 3.6   Chloride 105 105 103   Calcium 9.6 9.4 8.9   Albumin 4.30 4.10    Total Bilirubin 0.2 0.3    Alkaline Phosphatase 62 88    AST (SGOT) 19 21    ALT (SGPT) 14 17    (A) Abnormal value       Comments are available for some flowsheets but are not being displayed.           CBC    CBC 9/23/20 1/1/21 1/2/21   WBC 5.67 6.49 6.52   RBC 4.44 4.95 4.80   Hemoglobin 11.5 (A) 12.5 12.0   Hematocrit 36.2 41.2 39.3   MCV 81.5 83.2 81.9   MCH 25.9 (A) 25.3 (A)  25.0 (A)   MCHC 31.8 30.3 (A) 30.5 (A)   RDW 15.0 14.8 14.9   Platelets 268 289 280   (A) Abnormal value                TSH    TSH 9/23/20 1/1/21   TSH 3.340 2.110           A1C Last 3 Results    HGBA1C Last 3 Results 9/23/20 1/2/21 3/29/21   Hemoglobin A1C 5.8 6.20 (A) 6.1   (A) Abnormal value            Microalbumin    Microalbumin 6/17/20   Microalbumin, Urine <1.2                           Assessment and Plan      Diagnoses and all orders for this visit:    1. Type 2 diabetes mellitus without complication, without long-term current use of insulin (CMS/[facility]) (Primary)  Assessment & Plan:  Diabetes is improving with treatment.   Continue current treatment regimen.  Reminded to bring in blood sugar diary at next visit.  Dietary recommendations for ADA diet.  Regular aerobic exercise.  Discussed foot care.  Reminded to get yearly retinal exam.  Diabetes will be reassessed in 3 months.    Orders:  -     POC Glycosylated Hemoglobin (Hb A1C)    2. Essential hypertension  Assessment & Plan:  Hypertension is improving with treatment.  Continue current treatment regimen.  Weight loss.  Continue current medications.  Blood pressure will be reassessed at the next regular appointment.      3. Paroxysmal atrial fibrillation (CMS/[facility])  Assessment & Plan:  Regular rate and rhythm noted in office.  Agree with aspirin and beta-blocker.  She will continue both of these.      4. Thyromegaly  Assessment & Plan:  Generalized thyromegaly with no discrete masses or nodules noted.  We will check a thyroid ultrasound and thyroid labs for further evaluation.    Orders:  -     TSH; Future  -     T4, Free  -     US Thyroid; Future  -     Thyroid Antibodies; Future    5. Morbid obesity with BMI of 50.0-59.9, adult (CMS/[facility])  Assessment & Plan:  Patient's (Body mass index is 53.65 kg/m².) indicates that they are morbidly obese (BMI > 40 or > 35 with obesity - related health condition) with obesity-related health conditions that include  hypertension, diabetes mellitus and dyslipidemias . Obesity is improving with lifestyle modifications. BMI is is above average; BMI management plan is completed. We discussed portion control, increasing exercise and consulting a Bariatric surgeon.  Encouraged her to go ahead and get her paperwork completed so that she can see bariatric provider.       6. Encounter for screening mammogram for malignant neoplasm of breast  -     Mammo Screening Digital Tomosynthesis Bilateral With CAD; Future           Follow Up     Patient was given instructions and counseling regarding her condition or for health maintenance advice. Please see specific information pulled into the AVS if appropriate.   Any new medication's adverse effects have been discussed in detail with patient  Patient was encouraged to keep me informed of any acute changes, lack of improvement, or any new concerning symptoms.    Return in about 3 months (around 6/29/2021) for collect labs today.    * Please note that portions of this note were completed with a voice recognition program. Efforts were made to edit the dictation but occasionally words are erroneously transcribed.

## 2021-03-29 NOTE — ASSESSMENT & PLAN NOTE
Generalized thyromegaly with no discrete masses or nodules noted.  We will check a thyroid ultrasound and thyroid labs for further evaluation.

## 2021-03-30 LAB
THYROGLOB AB SERPL-ACNC: <1 IU/ML (ref 0–0.9)
THYROPEROXIDASE AB SERPL-ACNC: 9 IU/ML (ref 0–34)

## 2021-04-14 ENCOUNTER — APPOINTMENT (OUTPATIENT)
Dept: ULTRASOUND IMAGING | Facility: HOSPITAL | Age: 41
End: 2021-04-14

## 2021-04-20 ENCOUNTER — TELEMEDICINE (OUTPATIENT)
Dept: SLEEP MEDICINE | Facility: HOSPITAL | Age: 41
End: 2021-04-20

## 2021-04-20 ENCOUNTER — HOSPITAL ENCOUNTER (OUTPATIENT)
Dept: ULTRASOUND IMAGING | Facility: HOSPITAL | Age: 41
Discharge: HOME | End: 2021-04-20
Admitting: NURSE PRACTITIONER

## 2021-04-20 DIAGNOSIS — G47.26 CIRCADIAN RHYTHM SLEEP DISORDER, SHIFT WORK TYPE: ICD-10-CM

## 2021-04-20 DIAGNOSIS — E66.01 MORBID OBESITY WITH BMI OF 50.0-59.9, ADULT (HCC): ICD-10-CM

## 2021-04-20 DIAGNOSIS — E01.0 THYROMEGALY: ICD-10-CM

## 2021-04-20 DIAGNOSIS — G47.33 OBSTRUCTIVE SLEEP APNEA, ADULT: ICD-10-CM

## 2021-04-20 DIAGNOSIS — R06.83 SNORING: Primary | ICD-10-CM

## 2021-04-20 PROCEDURE — 76536 US EXAM OF HEAD AND NECK: CPT

## 2021-04-20 PROCEDURE — 99203 OFFICE O/P NEW LOW 30 MIN: CPT | Performed by: INTERNAL MEDICINE

## 2021-04-20 NOTE — PROGRESS NOTES
"Subjective   Fuad Nielsen is a 41 y.o. female is being seen for consultation today at the request of FAMILIA Galaviz for the evaluation of nonrestorative sleep.  You have chosen to receive care through a telehealth visit.  Do you consent to use a video/audio connection for your medical care today? Yes    History of Present Illness  Patient states that she has always snored\".  She does not feel rested in the morning.  She says her sleep is also disrupted recently since she is now working nights.  She had a Covid infection in December and has felt tired.  She also had an episode of atrial fibrillation in January.  She denies being noted to have apneas.  She has been noted to talk in her sleep.    She says she is not rested on arising but denies any headaches.  She had a sleep study between 7 and 10 years ago at [facility] and was told she had mild sleep apnea but she only declined to be placed on CPAP.  She thinks her weight is about the same.  She denies ever breaking her nose or having trouble breathing through her nose.  She denies kicking or jerking her legs at night.  She occasional foot pain for the past year.    Patient works night shifts 3-4 times per week.  When she is off work she goes to bed about 11 PM and will fall asleep in 30-minute.  She awakens once or twice during the night and gets 5 to 6 hours of sleep.  When she is working she frequently does not get bed till 9 AM monitor.  Immediately few hours sleep over several days due to family commitments.  She has had hypertension known for 20 years.  She has had diabetes known for 10 years.  She has had atrial fibrillation diagnosed this year.  She had gastric sleeve procedure several years ago and lost weight although she is regained some of that.  No Known Allergies       Current Outpatient Medications:   •  amLODIPine (NORVASC) 10 MG tablet, Take 1 tablet by mouth Daily., Disp: 30 tablet, Rfl: 6  •  aspirin (aspirin) 81 MG EC tablet, Take 1 " tablet by mouth Daily., Disp: 90 tablet, Rfl: 3  •  Blood Glucose Monitoring Suppl (FREESTYLE FREEDOM LITE) w/Device kit, Use as directed to test blood sugar once daily, Disp: 1 each, Rfl: 0  •  Etonogestrel (NEXPLANON SC), Inject  under the skin into the appropriate area as directed., Disp: , Rfl:   •  glucose blood test strip, Use as directed to test blood sugar once Daily., Disp: 100 each, Rfl: 11  •  Lancets misc, Use as directed to test blood sugar once Daily., Disp: 100 each, Rfl: 11  •  lisinopril (PRINIVIL,ZESTRIL) 10 MG tablet, Take 1 tablet by mouth Daily., Disp: 30 tablet, Rfl: 3  •  metoprolol tartrate (LOPRESSOR) 25 MG tablet, Take 0.5 tablets by mouth Every 12 (Twelve) Hours., Disp: 30 tablet, Rfl: 2  •  naproxen (NAPROSYN) 500 MG tablet, Take 1 tablet by mouth 2 (Two) Times a Day As Needed for Mild Pain With food, Disp: 60 tablet, Rfl: 3    Social History    Tobacco Use      Smoking status: Former Smoker she estimates being 1/2 pack/day for 3 years.        Types: Cigarettes        Quit date: 2018        Years since quittin.7      Smokeless tobacco: Never Used       Social History     Substance and Sexual Activity   Alcohol Use Yes    Comment: 1-2 per month       Caffeine: She has 1 to 2 cups of coffee per day    Past Medical History:   Diagnosis Date   • Abnormal Pap smear of cervix    • Anemia    • Diabetes (CMS/HCC) 2009    diagnosed in  and was normal in    • Fibroids     uterine   • Hypertension      She has had a history of atrial fibrillation episode  Past Surgical History:   Procedure Laterality Date   •  SECTION  2018   • COLPOSCOPY W/ BIOPSY / CURETTAGE     • GASTRIC SLEEVE LAPAROSCOPIC  10/2012   • OOPHORECTOMY Right 1999   • WISDOM TOOTH EXTRACTION         Family History   Problem Relation Age of Onset   • Obesity Mother    • Hypertension Mother    Family history is for diabetes and stroke    The following portions of the patient's history were reviewed  and updated as appropriate: allergies, current medications, past family history, past medical history, past social history, past surgical history and problem list.    Review of Systems   Constitutional: Positive for fatigue.   HENT:        She complains of some pain in her face since she has had Covid.   Eyes: Negative.    Respiratory: Negative.    Cardiovascular: Positive for palpitations.   Gastrointestinal: Negative.    Endocrine: Negative.    Genitourinary: Negative.    Musculoskeletal: Negative.    Skin: Negative.    Allergic/Immunologic: Negative.    Neurological: Positive for headaches.   Hematological: Negative.    Psychiatric/Behavioral: Negative.    [place] score is 6/24    Objective     There were no vitals taken for this visit.     Physical Exam  Patient appears to be awake and alert.  She does not appear to be in acute respiratory distress.  Her stated weight is 363 pounds.  Her height 5 feet 9 inches and her body mass index is 53.  6 5.  She has Mallampati class II anatomy.    Assessment/Plan   Diagnoses and all orders for this visit:    1. Snoring (Primary)  -     Polysomnography 4 or More Parameters; Future    2. Obstructive sleep apnea, adult  -     Polysomnography 4 or More Parameters; Future    3. Morbid obesity with BMI of 50.0-59.9, adult (CMS/[facility])    4. Circadian rhythm sleep disorder, shift work type    Patient has a history of snoring nonrestorative sleep.  Apparently she was previously diagnosed with obstructive sleep apnea several years ago but the not elect to have therapy.  She states her weight is about the same.  I suspect she still has significant obstructive sleep apnea.  We will plan to proceed to polysomnogram.  We have discussed possible therapies including CPAP, weight control, oral appliance, and surgery.  She is encouraged to lose weight.  She declines referral to a dietitian.  She is encouraged to avoid alcohol and sedatives close to bedtime.  She is encouraged to practice  lateral position sleep.    Patient has significant disruption of sleep related to circadian rhythm disorder due to shift work.  We discussed some measures to try improve this.  She is also check [email or web address].  This may need to be addressed further.    Total time: 35 minutes exclusive of procedures.         Kunal Nuñez MD [facility]  Sleep Medicine  Pulmonary and Critical Care Medicine

## 2021-04-26 DIAGNOSIS — E04.2 MULTINODULAR GOITER (NONTOXIC): Primary | ICD-10-CM

## 2021-05-14 ENCOUNTER — APPOINTMENT (OUTPATIENT)
Dept: MAMMOGRAPHY | Facility: HOSPITAL | Age: 41
End: 2021-05-14

## 2021-05-16 ENCOUNTER — APPOINTMENT (OUTPATIENT)
Dept: PREADMISSION TESTING | Facility: HOSPITAL | Age: 41
End: 2021-05-16

## 2021-05-17 ENCOUNTER — PRE-ADMISSION TESTING (OUTPATIENT)
Dept: PREADMISSION TESTING | Facility: HOSPITAL | Age: 41
End: 2021-05-17

## 2021-05-17 LAB
FLUAV RNA RESP QL NAA+PROBE: NOT DETECTED
FLUBV RNA RESP QL NAA+PROBE: NOT DETECTED
SARS-COV-2 RNA RESP QL NAA+PROBE: NOT DETECTED

## 2021-05-17 PROCEDURE — C9803 HOPD COVID-19 SPEC COLLECT: HCPCS

## 2021-05-17 PROCEDURE — 87636 SARSCOV2 & INF A&B AMP PRB: CPT

## 2021-05-18 ENCOUNTER — HOSPITAL ENCOUNTER (OUTPATIENT)
Dept: SLEEP MEDICINE | Facility: HOSPITAL | Age: 41
Discharge: HOME | End: 2021-05-18
Admitting: INTERNAL MEDICINE

## 2021-05-18 VITALS
HEIGHT: 69 IN | OXYGEN SATURATION: 97 % | BODY MASS INDEX: 43.4 KG/M2 | HEART RATE: 86 BPM | WEIGHT: 293 LBS | SYSTOLIC BLOOD PRESSURE: 145 MMHG | DIASTOLIC BLOOD PRESSURE: 88 MMHG

## 2021-05-18 DIAGNOSIS — G47.33 OBSTRUCTIVE SLEEP APNEA, ADULT: ICD-10-CM

## 2021-05-18 DIAGNOSIS — R06.83 SNORING: ICD-10-CM

## 2021-05-18 PROCEDURE — 95810 POLYSOM 6/> YRS 4/> PARAM: CPT | Performed by: INTERNAL MEDICINE

## 2021-05-18 PROCEDURE — 95810 POLYSOM 6/> YRS 4/> PARAM: CPT

## 2021-05-19 DIAGNOSIS — G47.33 MODERATE OBSTRUCTIVE SLEEP APNEA: Primary | ICD-10-CM

## 2021-05-28 ENCOUNTER — OFFICE VISIT (OUTPATIENT)
Dept: BARIATRICS/WEIGHT MGMT | Facility: CLINIC | Age: 41
End: 2021-05-28

## 2021-05-28 VITALS
HEART RATE: 76 BPM | WEIGHT: 293 LBS | DIASTOLIC BLOOD PRESSURE: 64 MMHG | BODY MASS INDEX: 43.4 KG/M2 | OXYGEN SATURATION: 98 % | RESPIRATION RATE: 18 BRPM | SYSTOLIC BLOOD PRESSURE: 128 MMHG | HEIGHT: 69 IN | TEMPERATURE: 98 F

## 2021-05-28 DIAGNOSIS — R10.13 DYSPEPSIA: ICD-10-CM

## 2021-05-28 DIAGNOSIS — G47.33 MODERATE OBSTRUCTIVE SLEEP APNEA: ICD-10-CM

## 2021-05-28 DIAGNOSIS — I10 ESSENTIAL HYPERTENSION: Chronic | ICD-10-CM

## 2021-05-28 DIAGNOSIS — E11.9 TYPE 2 DIABETES MELLITUS WITHOUT COMPLICATION, WITHOUT LONG-TERM CURRENT USE OF INSULIN (HCC): Chronic | ICD-10-CM

## 2021-05-28 DIAGNOSIS — E66.01 MORBID OBESITY WITH BMI OF 50.0-59.9, ADULT (HCC): Primary | ICD-10-CM

## 2021-05-28 PROCEDURE — 99214 OFFICE O/P EST MOD 30 MIN: CPT | Performed by: PHYSICIAN ASSISTANT

## 2021-05-28 NOTE — PROGRESS NOTES
[facility] BARIATRIC SURGERY  [address]  [address]  [facility] [id number] [phone]      Patient  Name:  Fuad Nielsen  :  1980      Date of Visit: 2021      Chief Complaint:  weight gain; unable to maintain weight loss      History of Present Illness:  Fuad Nielsen is a 41 y.o. female who presents today for evaluation, education and consultation regarding revision metabolic and bariatric surgery with Dr. Goddard.     s/p lap robotic LSG 10/2012 w/ Dr. Goddard.  Highest Lifetime weight:  397 lbs.  Presurgery weight: 386 lbs.  Lowest weight:  280 lbs in 2013, but then slowly started to regain.  Current weight:  359 lbs w/ BMI 53.    Having some health/weight related issues (worsening HTN, DM, AFib post COVID, and newly dx ALINA).  NP advises revision evaluation.    Feels she still has portion control.  Realizes LSG was a tool.  Admits that working night-shift and raising a 3y/o has not left her much time/energy to take care of herself.  Not avoiding HFCS.  Drinking soda again (Dr.Pepper Rick).  Not exercising consistently but stays busy w/ work and playing w/ her child.  Only has dysphagia w/ rice.  No heartburn/reflux issues.  Denies N/V/AP.       Complete history has been obtained and discussed today, as pertinent to metabolic/ bariatric surgery.     Past Medical History:   Diagnosis Date   • Abnormal Pap smear of cervix    • Anemia    • Atrial fibrillation (CMS/HCC)     after COVID, did not require cardioversion - on BBlocker + ASA - follows w/ Dr. Harris   • COVID-19     2020   • Diabetes (CMS/HCC) 2009    dx in , never on insulin, now diet controlled, most recent A1C 6.1   • Dyspepsia    • Dyspnea on exertion    • Fatigue    • Fibroids    • History of Helicobacter pylori infection     treated remotely   • Hypertension    • Iron deficiency    • Morbid obesity with BMI of 50.0-59.9, adult (CMS/HCC)    • Sleep apnea     newly dx, awaiting device   • Thyroid  disorder     multinodular - following w/ Endocrinology   • Vitamin D deficiency      Past Surgical History:   Procedure Laterality Date   •  SECTION  2018   • COLONOSCOPY  2016   • COLPOSCOPY W/ BIOPSY / CURETTAGE     • ENDOSCOPY     • GASTRIC SLEEVE LAPAROSCOPIC  10/2012    [facility]   • OOPHORECTOMY Right 2004    benign cysts   • WISDOM TOOTH EXTRACTION         No Known Allergies    Current Outpatient Medications:   •  amLODIPine (NORVASC) 10 MG tablet, Take 1 tablet by mouth Daily., Disp: 30 tablet, Rfl: 6  •  aspirin (aspirin) 81 MG EC tablet, Take 1 tablet by mouth Daily., Disp: 90 tablet, Rfl: 3  •  Blood Glucose Monitoring Suppl (FREESTYLE FREEDOM LITE) w/Device kit, Use as directed to test blood sugar once daily, Disp: 1 each, Rfl: 0  •  Etonogestrel (NEXPLANON SC), Inject  under the skin into the appropriate area as directed., Disp: , Rfl:   •  glucose blood test strip, Use as directed to test blood sugar once Daily., Disp: 100 each, Rfl: 11  •  Lancets misc, Use as directed to test blood sugar once Daily., Disp: 100 each, Rfl: 11  •  lisinopril (PRINIVIL,ZESTRIL) 10 MG tablet, Take 1 tablet by mouth Daily., Disp: 30 tablet, Rfl: 3  •  metoprolol tartrate (LOPRESSOR) 25 MG tablet, Take 0.5 tablets by mouth Every 12 (Twelve) Hours., Disp: 30 tablet, Rfl: 2  •  naproxen (NAPROSYN) 500 MG tablet, Take 1 tablet by mouth 2 (Two) Times a Day As Needed for Mild Pain With food, Disp: 60 tablet, Rfl: 3    Social History     Socioeconomic History   • Marital status: Single     Spouse name: Not on file   • Number of children: Not on file   • Years of education: Not on file   • Highest education level: Not on file   Tobacco Use   • Smoking status: Former Smoker     Packs/day: 3.00     Types: Cigarettes     Quit date: 2018     Years since quittin.8   • Smokeless tobacco: Never Used   Substance and Sexual Activity   • Alcohol use: Yes     Comment: 1x per week, 2 drinks, x10yrs   • Drug use:  Never   • Sexual activity: Yes     Partners: Male     Birth control/protection: LogMeIn     Social History     Social History Narrative    Lives in [place], [place].  Single w/ 1 child (3yo).  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        Family History   Problem Relation Age of Onset   • Obesity Mother    • Hypertension Mother    • Other Father 39        car accident   • Hypertension Father    • Diabetes Sister    • No Known Problems Maternal Grandfather    • Hypertension Paternal Grandmother    • Heart disease Paternal Grandfather    • Stroke Paternal Grandfather    • Heart attack Paternal Grandfather    • Aneurysm Maternal Grandmother         Brain       Review of Systems:  Constitutional:  reports fatigue, weight gain and denies fevers, chills.  HEENT:  denies headache, ear pain or loss of hearing, blurred or double vision, nasal discharge or sore throat.  Cardiovascular:  reports HTN, Atrial Fib and denies chest pain, palpitations, hx DVT.  Respiratory:  reports sleep apnea and denies cough , wheezing, asthma, hx PE.  Gastrointestinal:  denies heartburn, nausea, vomiting, abdominal pain, IBS, gallbladder issues, liver disease.  Genitourinary:  denies history of  frequent UTI, incontinence, hematuria, dysuria, polyuria, polydipsia, renal insufficiency.    Musculoskeletal:   denies fibromyalgia, arthritis and autoimmune disease.  Neurological:   denies numbness /tingling, dizziness, confusion, seizure, stroke.  Psychiatric:   denies hx depression, hx anxiety, bipolar disorder.  Endocrine:  reports diabetes, thyroid disease   Hematologic:   denies bleeding disorder, hx anemia, hx blood transfusion.  Skin: denies rashes, hx MRSA.    Physical Exam:  Vital Signs:  Weight: (!) 163 kg (359 lb)   Body mass index is 53.02 kg/m².  Temp: 98 °F (36.7 °C)   Heart Rate: 76   BP: 128/64     Physical Exam  Vitals reviewed.   Constitutional:       Appearance: She is well-developed.      Comments: wearing a mask   HENT:      Head: Normocephalic and atraumatic.    Eyes:      General: No scleral icterus.     Conjunctiva/sclera: Conjunctivae normal.   Neck:      Thyroid: Thyromegaly present.   Cardiovascular:      Rate and Rhythm: Normal rate and regular rhythm.      Heart sounds: No murmur heard.     Pulmonary:      Effort: Pulmonary effort is normal. No respiratory distress.      Breath sounds: Normal breath sounds. No wheezing or rales.   Abdominal:      General: Bowel sounds are normal. There is no distension.      Palpations: Abdomen is soft. There is no mass.      Tenderness: There is no abdominal tenderness.      Hernia: No hernia is present.      Comments: scars: LSG, lower midline   Musculoskeletal:         General: Normal range of motion.      Cervical back: Neck supple.   Skin:     General: Skin is warm and dry.      Findings: No rash.   Neurological:      Mental Status: She is alert and oriented to person, place, and time.      Gait: Gait normal.   Psychiatric:         Judgment: Judgment normal.         Patient Active Problem List   Diagnosis   • Type 2 diabetes mellitus without complication, without long-term current use of insulin (CMS/[facility])   • Iron deficiency anemia secondary to inadequate dietary iron intake   • Uterine leiomyoma   • Bilateral carpal tunnel syndrome   • B12 deficiency   • Morbid obesity with BMI of 50.0-59.9, adult (CMS/HCC)   • A-fib (CMS/[facility])   • Essential hypertension   • COVID-19   • Moderate obstructive sleep apnea (Severe in REM sleep)   • Thyromegaly   • Fatigue   • Dyspepsia   • Dyspnea on exertion   • History of Helicobacter pylori infection   • Vitamin D deficiency   • Thyroid disorder   • Sleep apnea       Assessment:  41 y.o. female s/p lap robotic LSG 10/2012 w/ Dr. Goddard, pursuing revision.    Metabolic & Bariatric Surgery is deemed medically necessary given the following: Patient's Body mass index is 53.02 kg/m². indicating that she is obese (BMI >30). Obesity-related health conditions include the following: obstructive sleep  apnea, hypertension and diabetes mellitus. Obesity is worsening. BMI is is above average; BMI management plan is completed. We discussed consulting a Bariatric surgeon.        Plan:  Current revision options unknown.  Will schedule UGI + EGD w/ Dr. Goddard to further evaluate.  If deemed an acceptable revision candidate, further evaluation will include: CBC, CMP, Lipids, TSH, HgA1C, H.Pylori UBT, EKG, CXR, Pulmonary Function Testing, Gallbladder Eval and Cardiac Clearance (w/ aspirin/anticoagulation instructions).      Patient understands that bariatric surgery is not cosmetic surgery but rather a tool to help make a lifelong commitment to lifestyle changes including diet, exercise and behavior modifications.  The patient has been educated today on those expected postoperative lifestyle changes.  Psychological and Nutritional consultations will be completed prior to surgery.  All questions/concerns have been addressed.      Further input to follow pending the above.           MARYA Martinez        Addendum:    UGI at [facility] IMPRESSION:  Upper GI series, consistent with previous gastric sleeve  surgery otherwise unremarkable. No evidence of obstruction or  significant mucosal irregularity.     Will proceed with EGD for further evaluation.     Pastora Dixon PA-C

## 2021-06-09 ENCOUNTER — TELEPHONE (OUTPATIENT)
Dept: SLEEP MEDICINE | Facility: HOSPITAL | Age: 41
End: 2021-06-09

## 2021-06-09 NOTE — TELEPHONE ENCOUNTER
SPOKE WITH ALISIA AT [facility]. STATED THAT THEY HAVE BEEN BEHIND AND SHE WOULD CALL THE PATIENT TODAY 06/09/21 [facility]

## 2021-06-09 NOTE — TELEPHONE ENCOUNTER
LIANG STAPLETON IS CALLING DR. ROUSSEAU IN REGARDS TO HER NOT GETTING CPAP MACHINE.    CALLBACK .[phone]

## 2021-06-11 ENCOUNTER — APPOINTMENT (OUTPATIENT)
Dept: PREADMISSION TESTING | Facility: HOSPITAL | Age: 41
End: 2021-06-11

## 2021-06-11 PROCEDURE — U0004 COV-19 TEST NON-CDC HGH THRU: HCPCS

## 2021-06-11 PROCEDURE — C9803 HOPD COVID-19 SPEC COLLECT: HCPCS

## 2021-06-12 LAB — SARS-COV-2 RNA NOSE QL NAA+PROBE: NOT DETECTED

## 2021-06-14 ENCOUNTER — HOSPITAL ENCOUNTER (OUTPATIENT)
Dept: GENERAL RADIOLOGY | Facility: HOSPITAL | Age: 41
Discharge: HOME | End: 2021-06-14
Admitting: PHYSICIAN ASSISTANT

## 2021-06-14 DIAGNOSIS — R10.13 DYSPEPSIA: ICD-10-CM

## 2021-06-14 PROCEDURE — 74240 X-RAY XM UPR GI TRC 1CNTRST: CPT

## 2021-06-14 RX ADMIN — BARIUM SULFATE 183 ML: 960 POWDER, FOR SUSPENSION ORAL at 08:23

## 2021-06-15 ENCOUNTER — PREP FOR SURGERY (OUTPATIENT)
Dept: OTHER | Facility: HOSPITAL | Age: 41
End: 2021-06-15

## 2021-06-15 DIAGNOSIS — R10.13 DYSPEPSIA: Primary | ICD-10-CM

## 2021-06-15 RX ORDER — SODIUM CHLORIDE 0.9 % (FLUSH) 0.9 %
3 SYRINGE (ML) INJECTION EVERY 12 HOURS SCHEDULED
Status: CANCELLED | OUTPATIENT
Start: 2021-06-15

## 2021-06-15 RX ORDER — SODIUM CHLORIDE 0.9 % (FLUSH) 0.9 %
3-10 SYRINGE (ML) INJECTION AS NEEDED
Status: CANCELLED | OUTPATIENT
Start: 2021-06-15

## 2021-06-15 RX ORDER — SODIUM CHLORIDE 9 MG/ML
150 INJECTION, SOLUTION INTRAVENOUS CONTINUOUS
Status: CANCELLED | OUTPATIENT
Start: 2021-06-15

## 2021-06-19 ENCOUNTER — HOSPITAL ENCOUNTER (OUTPATIENT)
Dept: MAMMOGRAPHY | Facility: HOSPITAL | Age: 41
Discharge: HOME | End: 2021-06-19
Admitting: NURSE PRACTITIONER

## 2021-06-19 ENCOUNTER — APPOINTMENT (OUTPATIENT)
Dept: OTHER | Facility: HOSPITAL | Age: 41
End: 2021-06-19

## 2021-06-19 DIAGNOSIS — Z92.89 H/O MAMMOGRAM: ICD-10-CM

## 2021-06-19 DIAGNOSIS — Z12.31 ENCOUNTER FOR SCREENING MAMMOGRAM FOR MALIGNANT NEOPLASM OF BREAST: ICD-10-CM

## 2021-06-19 PROCEDURE — 77067 SCR MAMMO BI INCL CAD: CPT

## 2021-06-19 PROCEDURE — 77067 SCR MAMMO BI INCL CAD: CPT | Performed by: RADIOLOGY

## 2021-06-19 PROCEDURE — 77063 BREAST TOMOSYNTHESIS BI: CPT

## 2021-06-19 PROCEDURE — 77063 BREAST TOMOSYNTHESIS BI: CPT | Performed by: RADIOLOGY

## 2021-06-25 PROBLEM — R92.8 ABNORMAL MAMMOGRAM: Status: ACTIVE | Noted: 2021-06-25

## 2021-06-29 ENCOUNTER — OFFICE VISIT (OUTPATIENT)
Dept: INTERNAL MEDICINE | Facility: CLINIC | Age: 41
End: 2021-06-29

## 2021-06-29 VITALS
HEART RATE: 101 BPM | BODY MASS INDEX: 43.4 KG/M2 | DIASTOLIC BLOOD PRESSURE: 82 MMHG | SYSTOLIC BLOOD PRESSURE: 128 MMHG | RESPIRATION RATE: 18 BRPM | TEMPERATURE: 97.5 F | OXYGEN SATURATION: 97 % | WEIGHT: 293 LBS | HEIGHT: 69 IN

## 2021-06-29 DIAGNOSIS — E11.9 TYPE 2 DIABETES MELLITUS WITHOUT COMPLICATION, WITHOUT LONG-TERM CURRENT USE OF INSULIN (HCC): Primary | ICD-10-CM

## 2021-06-29 DIAGNOSIS — Z72.51 UNPROTECTED SEXUAL INTERCOURSE: ICD-10-CM

## 2021-06-29 DIAGNOSIS — J02.9 SORE THROAT: ICD-10-CM

## 2021-06-29 DIAGNOSIS — I10 ESSENTIAL HYPERTENSION: ICD-10-CM

## 2021-06-29 DIAGNOSIS — E04.2 MULTINODULAR GOITER (NONTOXIC): ICD-10-CM

## 2021-06-29 DIAGNOSIS — G47.33 MODERATE OBSTRUCTIVE SLEEP APNEA: ICD-10-CM

## 2021-06-29 DIAGNOSIS — N89.8 VAGINAL DISCHARGE: ICD-10-CM

## 2021-06-29 DIAGNOSIS — B35.1 ONYCHOMYCOSIS: ICD-10-CM

## 2021-06-29 DIAGNOSIS — R30.0 DYSURIA: ICD-10-CM

## 2021-06-29 DIAGNOSIS — J30.2 SEASONAL ALLERGIC RHINITIS, UNSPECIFIED TRIGGER: ICD-10-CM

## 2021-06-29 DIAGNOSIS — I48.0 PAROXYSMAL ATRIAL FIBRILLATION (HCC): ICD-10-CM

## 2021-06-29 DIAGNOSIS — E66.01 MORBID OBESITY WITH BMI OF 50.0-59.9, ADULT (HCC): ICD-10-CM

## 2021-06-29 LAB
ALBUMIN UR-MCNC: <1.2 MG/DL
B-HCG UR QL: NEGATIVE
BILIRUB BLD-MCNC: NEGATIVE MG/DL
CLARITY, POC: CLEAR
COLOR UR: YELLOW
EXPIRATION DATE: NORMAL
GLUCOSE UR STRIP-MCNC: NEGATIVE MG/DL
HBA1C MFR BLD: 5.9 %
INTERNAL CONTROL: NORMAL
INTERNAL NEGATIVE CONTROL: NORMAL
INTERNAL POSITIVE CONTROL: NORMAL
KETONES UR QL: NEGATIVE
LEUKOCYTE EST, POC: NEGATIVE
Lab: NORMAL
Lab: NORMAL
NITRITE UR-MCNC: NEGATIVE MG/ML
PH UR: 6 [PH] (ref 5–8)
PROT UR STRIP-MCNC: NEGATIVE MG/DL
RBC # UR STRIP: NEGATIVE /UL
S PYO AG THROAT QL: NEGATIVE
SP GR UR: 1.03 (ref 1–1.03)
UROBILINOGEN UR QL: NORMAL

## 2021-06-29 PROCEDURE — 87801 DETECT AGNT MULT DNA AMPLI: CPT | Performed by: NURSE PRACTITIONER

## 2021-06-29 PROCEDURE — 81025 URINE PREGNANCY TEST: CPT | Performed by: NURSE PRACTITIONER

## 2021-06-29 PROCEDURE — 87591 N.GONORRHOEAE DNA AMP PROB: CPT | Performed by: NURSE PRACTITIONER

## 2021-06-29 PROCEDURE — 83036 HEMOGLOBIN GLYCOSYLATED A1C: CPT | Performed by: NURSE PRACTITIONER

## 2021-06-29 PROCEDURE — 87880 STREP A ASSAY W/OPTIC: CPT | Performed by: NURSE PRACTITIONER

## 2021-06-29 PROCEDURE — 87798 DETECT AGENT NOS DNA AMP: CPT | Performed by: NURSE PRACTITIONER

## 2021-06-29 PROCEDURE — 87491 CHLMYD TRACH DNA AMP PROBE: CPT | Performed by: NURSE PRACTITIONER

## 2021-06-29 PROCEDURE — 99214 OFFICE O/P EST MOD 30 MIN: CPT | Performed by: NURSE PRACTITIONER

## 2021-06-29 PROCEDURE — 87661 TRICHOMONAS VAGINALIS AMPLIF: CPT | Performed by: NURSE PRACTITIONER

## 2021-06-29 PROCEDURE — 82043 UR ALBUMIN QUANTITATIVE: CPT | Performed by: NURSE PRACTITIONER

## 2021-06-29 PROCEDURE — 3044F HG A1C LEVEL LT 7.0%: CPT | Performed by: NURSE PRACTITIONER

## 2021-06-29 PROCEDURE — 99999 PR OFFICE/OUTPT VISIT,PROCEDURE ONLY: CPT | Performed by: NURSE PRACTITIONER

## 2021-06-29 PROCEDURE — 81003 URINALYSIS AUTO W/O SCOPE: CPT | Performed by: NURSE PRACTITIONER

## 2021-06-29 RX ORDER — LISINOPRIL 10 MG/1
10 TABLET ORAL DAILY
Qty: 30 TABLET | Refills: 6 | Status: SHIPPED | OUTPATIENT
Start: 2021-06-29 | End: 2022-01-11 | Stop reason: SDUPTHER

## 2021-06-29 RX ORDER — FLUTICASONE PROPIONATE 50 MCG
2 SPRAY, SUSPENSION (ML) NASAL DAILY
Qty: 16 G | Refills: 11 | Status: SHIPPED | OUTPATIENT
Start: 2021-06-29

## 2021-07-01 LAB
A VAGINAE DNA VAG QL NAA+PROBE: ABNORMAL SCORE
BVAB2 DNA VAG QL NAA+PROBE: ABNORMAL SCORE
C ALBICANS DNA VAG QL NAA+PROBE: NEGATIVE
C GLABRATA DNA VAG QL NAA+PROBE: NEGATIVE
C TRACH DNA VAG QL NAA+PROBE: NEGATIVE
MEGA1 DNA VAG QL NAA+PROBE: ABNORMAL SCORE
N GONORRHOEA DNA VAG QL NAA+PROBE: NEGATIVE
T VAGINALIS DNA VAG QL NAA+PROBE: NEGATIVE

## 2021-07-06 DIAGNOSIS — N76.0 BV (BACTERIAL VAGINOSIS): Primary | ICD-10-CM

## 2021-07-06 DIAGNOSIS — B96.89 BV (BACTERIAL VAGINOSIS): Primary | ICD-10-CM

## 2021-07-06 RX ORDER — METRONIDAZOLE 500 MG/1
500 TABLET ORAL 2 TIMES DAILY
Qty: 14 TABLET | Refills: 0 | Status: SHIPPED | OUTPATIENT
Start: 2021-07-06 | End: 2021-07-14

## 2021-07-15 ENCOUNTER — OFFICE VISIT (OUTPATIENT)
Dept: ENDOCRINOLOGY | Facility: CLINIC | Age: 41
End: 2021-07-15

## 2021-07-15 VITALS
WEIGHT: 293 LBS | HEART RATE: 68 BPM | BODY MASS INDEX: 43.4 KG/M2 | SYSTOLIC BLOOD PRESSURE: 148 MMHG | HEIGHT: 69 IN | DIASTOLIC BLOOD PRESSURE: 88 MMHG

## 2021-07-15 DIAGNOSIS — E04.2 NONTOXIC MULTINODULAR GOITER: Primary | ICD-10-CM

## 2021-07-15 PROCEDURE — 10005 FNA BX W/US GDN 1ST LES: CPT | Performed by: INTERNAL MEDICINE

## 2021-07-15 PROCEDURE — 99244 OFF/OP CNSLTJ NEW/EST MOD 40: CPT | Performed by: INTERNAL MEDICINE

## 2021-07-15 NOTE — PROGRESS NOTES
"Chief Complaint   Patient presents with   • Thyroid Problem        Referring Provider  Mandie Tobar, APRN     HPI   Fuad BURTON Morales is a 41 y.o. female had concerns including Thyroid Problem.     Pt had COVID in December. Then ended up with strep twice.   After this noted her \"throat was swollen\".     Thyroid ultrasound was completed in April showing a left 6.7 x 3.4 x 4.2 cm thyroid nodule.  No prior known history of thyroid nodule.  No history of FNA.  TFTs were normal.    Has postnasal drip and cough.  Denies dysphagia.    No family history of thyroid cancer no personal history of radiation therapy to the neck or chest.  Patient is a nurse in the NICU.    Past Medical History:   Diagnosis Date   • Abnormal Pap smear of cervix    • Anemia    • Atrial fibrillation (CMS/HCC)     after COVID, did not require cardioversion - on BBlocker + ASA - follows w/ Dr. Harris   • COVID-19     2020   • Diabetes (CMS/HCC) 2009    dx in , never on insulin, now diet controlled, most recent A1C 6.1   • Dyspepsia    • Dyspnea on exertion    • Fatigue    • Fibroids    • Goiter    • History of Helicobacter pylori infection     treated remotely   • Hypertension    • Iron deficiency    • Morbid obesity with BMI of 50.0-59.9, adult (CMS/HCC)    • Sleep apnea     newly dx, awaiting device   • Sleep apnea    • Thyroid disorder     multinodular - following w/ Endocrinology   • Vitamin D deficiency      Past Surgical History:   Procedure Laterality Date   •  SECTION  2018   • COLONOSCOPY     • COLPOSCOPY W/ BIOPSY / CURETTAGE     • ENDOSCOPY     • GASTRIC SLEEVE LAPAROSCOPIC  10/2012    [facility]   • OOPHORECTOMY Right 2004    benign cysts   • WISDOM TOOTH EXTRACTION        Family History   Problem Relation Age of Onset   • Obesity Mother    • Hypertension Mother    • Other Father 39        car accident   • Hypertension Father    • Diabetes Sister    • No Known Problems Maternal Grandfather    • " Hypertension Paternal Grandmother    • Heart disease Paternal Grandfather    • Stroke Paternal Grandfather    • Heart attack Paternal Grandfather    • Aneurysm Maternal Grandmother         Brain   • Ovarian cancer Neg Hx    • Breast cancer Neg Hx       Social History     Socioeconomic History   • Marital status: Single     Spouse name: Not on file   • Number of children: Not on file   • Years of education: Not on file   • Highest education level: Not on file   Tobacco Use   • Smoking status: Former Smoker     Packs/day: 3.00     Types: Cigarettes     Quit date: 2018     Years since quittin.9   • Smokeless tobacco: Never Used   Vaping Use   • Vaping Use: Never used   Substance and Sexual Activity   • Alcohol use: Yes     Comment: 1x per week, 2 drinks, x10yrs   • Drug use: Never   • Sexual activity: Yes     Partners: Male     Birth control/protection: Nexplanon      No Known Allergies   Current Outpatient Medications on File Prior to Visit   Medication Sig Dispense Refill   • amLODIPine (NORVASC) 10 MG tablet Take 1 tablet by mouth Daily. 30 tablet 6   • aspirin (aspirin) 81 MG EC tablet Take 1 tablet by mouth Daily. 90 tablet 3   • Blood Glucose Monitoring Suppl (FREESTYLE FREEDOM LITE) w/Device kit Use as directed to test blood sugar once daily 1 each 0   • ciclopirox (Penlac) 8 % solution Apply  topically to the appropriate area as directed Every Night. 6.6 mL 1   • Etonogestrel (NEXPLANON SC) Inject  under the skin into the appropriate area as directed.     • fluticasone (Flonase) 50 MCG/ACT nasal spray 2 sprays into the nostril(s) as directed by provider Daily. 16 g 11   • Lancets misc Use as directed to test blood sugar once Daily. 100 each 11   • lisinopril (PRINIVIL,ZESTRIL) 10 MG tablet Take 1 tablet by mouth Daily. 30 tablet 6   • metoprolol tartrate (LOPRESSOR) 25 MG tablet Take 0.5 tablets by mouth Every 12 (Twelve) Hours. 30 tablet 6   • naproxen (NAPROSYN) 500 MG tablet Take 1 tablet by mouth 2  "(Two) Times a Day As Needed for Mild Pain With food 60 tablet 3   • [] metroNIDAZOLE (Flagyl) 500 MG tablet Take 1 tablet by mouth 2 (Two) Times a Day for 7 days. 14 tablet 0   • [DISCONTINUED] glucose blood test strip Use as directed to test blood sugar once Daily. 100 each 11     No current facility-administered medications on file prior to visit.        Review of Systems   Constitutional: Positive for fatigue.   HENT: Positive for postnasal drip and rhinorrhea.         Lump in neck   Eyes: Negative.    Respiratory: Negative.    Cardiovascular: Negative.    Gastrointestinal: Negative.    Endocrine: Negative.    Genitourinary: Negative.    Musculoskeletal: Negative.    Skin: Negative.    Allergic/Immunologic: Negative.    Neurological: Negative.    Hematological: Negative.    Psychiatric/Behavioral: Negative.         /88   Pulse 68   Ht 175.3 cm (69\")   Wt (!) 162 kg (357 lb)   BMI 52.72 kg/m²      Physical Exam    Constitutional:  well developed; well nourished  no acute distress  obese - Body mass index is 52.72 kg/m².   ENT/Thyroid: Enlarged, left > right, 3 x normal size, no distinctly palpable nodules  no palpable nodules   Eyes: EOM intact  Conjunctiva: clear   Respiratory:  breathing is unlabored  clear to auscultation bilaterally   Cardiovascular:  regular rate and rhythm, S1, S2 normal, no murmur, click, rub or gallop   Chest:  Not performed.   Abdomen: Not performed.   : Not performed.   Musculoskeletal: negative findings:  ROM of all joints is normal, no deformities present   Skin: dry and warm   Neuro: normal without focal findings and mental status, speech normal, alert and oriented x3   Psych: oriented to time, place and person, mood and affect are within normal limits     Labs/Imaging  CMP  Lab Results   Component Value Date    GLUCOSE 96 2021    BUN 5 (L) 2021    CREATININE 0.75 2021    EGFRIFAFRI 104 2021    BCR 6.7 (L) 2021    K 3.6 2021    " CO2 25.0 01/02/2021    CALCIUM 8.9 01/02/2021    ALBUMIN 4.10 01/01/2021    AST 21 01/01/2021    ALT 17 01/01/2021        CBC w/DIFF   Lab Results   Component Value Date    WBC 6.52 01/02/2021    RBC 4.80 01/02/2021    HGB 12.0 01/02/2021    HCT 39.3 01/02/2021    MCV 81.9 01/02/2021    MCH 25.0 (L) 01/02/2021    MCHC 30.5 (L) 01/02/2021    RDW 14.9 01/02/2021    RDWSD 44.6 01/02/2021    MPV 11.0 01/02/2021     01/02/2021    NEUTRORELPCT 28.4 (L) 01/02/2021    LYMPHORELPCT 61.7 (H) 01/02/2021    MONORELPCT 6.6 01/02/2021    EOSRELPCT 2.5 01/02/2021    BASORELPCT 0.6 01/02/2021    AUTOIGPER 0.2 01/02/2021    NEUTROABS 1.86 01/02/2021    LYMPHSABS 4.02 (H) 01/02/2021    MONOSABS 0.43 01/02/2021    EOSABS 0.16 01/02/2021    BASOSABS 0.04 01/02/2021    AUTOIGNUM 0.01 01/02/2021    NRBC 0.0 01/02/2021     TSH  Lab Results   Component Value Date    TSH 1.410 03/29/2021    TSH 2.110 01/01/2021    TSH 3.340 09/23/2020     T4  Lab Results   Component Value Date    FREET4 1.14 03/29/2021     TPO  Lab Results   Component Value Date    THYROIDAB 9 03/29/2021     Narrative & Impression   EXAMINATION: US THYROID - 04/20/2021     INDICATION:  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  Enlargement of the thyroid.      TECHNIQUE: Sonographic imaging is obtained of the thyroid in both the  sagittal and transverse planes.     COMPARISON: None.     FINDINGS: The thyroid is enlarged and asymmetric with the left lobe  being larger than the right. The isthmus is enlarged at 8 mm and is  heterogeneous. The right lobe thyroid measures 5.4 x 2.4 x 2.4 cm. The  echotexture is heterogeneous in appearance with a nodule inferiorly  measuring largest dimension 3.1 cm. There is a dominant nodule both  solid and cystic in appearance within the left lobe thyroid near  completely filling the left lobe the thyroid. The left lobe itself  measures 7.3 x 3.7 x 4.2 cm. The nodule measures 6.7 x 3.4 x 4.2 cm.      IMPRESSION:  Findings suggesting a multinodular goiter with the largest  nodule identified near completely filling the left lobe the thyroid  measuring 6.7 cm in its largest dimension. Fine needle aspiration can be  performed if clinically indicated.     DICTATED:   04/21/2021  EDITED/ls :   04/21/2021         This report was finalized on 4/23/2021 4:31 PM by Dr. Jackelin Shoemaker MD.          Thyroid Biopsy Procedure Note      Indications: Thyroid Nodule    Anesthesia: Ethyl chloride spray    Procedure Details   The procedure, risks and complications were discussed in detail with the patient (including but not limited to infection, bleeding), and the patient signed consent for the procedure.    The neck was prepped in sterile fashion.  Biopsy site: left 6.7 cm thyroid nodule  With ultrasound guidance of needle localization,   4  aspirates were obtained with sterile 27 g. needles.  8 slides were prepared with both air drying and immediate cytology fixative.    A single container with 20 ml of cytology fixative was also used to collect needle and syringe washings.   Specimens were sent to pathology.   An additional specimen was collected for molecular analysis and will be sent in the event of indeterminate biopsy.     The patient tolerated the procedure without difficulty or complications.             Assessment and Plan    Diagnoses and all orders for this visit:    1. Nontoxic multinodular goiter (Primary)  Ultrasound images and report from April were reviewed.  Patient has a large left-sided nodule measuring 6.7 x 3.4 x 4.2 cm.  FNA was completed in the office today.  TFTs normal.   Discussed management of thyroid nodules depending on pathology results.  If benign, I will repeat ultrasound in 1 year to monitor size and appearance.  If malignant, refer to endocrine surgery for thyroidectomy.  In the event of a nondiagnostic specimen, repeat FNA may be needed.  In the event of atypia of undetermined  significance, either molecular analysis can be sent, repeat FNA, or surgical removal.  -     US Thyroid         Return in about 1 year (around 7/15/2022) for next scheduled follow up, with thyroid ultrasound. The patient was instructed to contact the clinic with any interval questions or concerns.    Jessica Aden, DO   Endocrinologist    Please note that portions of this document were completed with a voice recognition program. Efforts were made to edit the dictations, but occasionally words are mis-transcribed.

## 2021-07-16 ENCOUNTER — TELEPHONE (OUTPATIENT)
Dept: ENDOCRINOLOGY | Facility: CLINIC | Age: 41
End: 2021-07-16

## 2021-07-19 ENCOUNTER — TELEPHONE (OUTPATIENT)
Dept: ENDOCRINOLOGY | Facility: CLINIC | Age: 41
End: 2021-07-19

## 2021-07-20 ENCOUNTER — HOSPITAL ENCOUNTER (OUTPATIENT)
Dept: ULTRASOUND IMAGING | Facility: HOSPITAL | Age: 41
Discharge: HOME | End: 2021-07-20

## 2021-07-20 ENCOUNTER — TRANSCRIBE ORDERS (OUTPATIENT)
Dept: MAMMOGRAPHY | Facility: HOSPITAL | Age: 41
End: 2021-07-20

## 2021-07-20 ENCOUNTER — HOSPITAL ENCOUNTER (OUTPATIENT)
Dept: MAMMOGRAPHY | Facility: HOSPITAL | Age: 41
Discharge: HOME | End: 2021-07-20

## 2021-07-20 ENCOUNTER — TRANSCRIBE ORDERS (OUTPATIENT)
Dept: INTERNAL MEDICINE | Facility: CLINIC | Age: 41
End: 2021-07-20

## 2021-07-20 DIAGNOSIS — R92.8 ABNORMAL MAMMOGRAM: ICD-10-CM

## 2021-07-20 DIAGNOSIS — R92.8 ABNORMAL MAMMOGRAM: Primary | ICD-10-CM

## 2021-07-20 PROCEDURE — 77065 DX MAMMO INCL CAD UNI: CPT | Performed by: RADIOLOGY

## 2021-07-20 PROCEDURE — 77061 BREAST TOMOSYNTHESIS UNI: CPT | Performed by: RADIOLOGY

## 2021-07-20 PROCEDURE — 77065 DX MAMMO INCL CAD UNI: CPT

## 2021-07-20 PROCEDURE — 76642 ULTRASOUND BREAST LIMITED: CPT

## 2021-07-20 PROCEDURE — G0279 TOMOSYNTHESIS, MAMMO: HCPCS

## 2021-07-20 PROCEDURE — 76642 ULTRASOUND BREAST LIMITED: CPT | Performed by: RADIOLOGY

## 2021-08-02 ENCOUNTER — HOSPITAL ENCOUNTER (OUTPATIENT)
Dept: MAMMOGRAPHY | Facility: HOSPITAL | Age: 41
Discharge: HOME | End: 2021-08-02

## 2021-08-02 ENCOUNTER — HOSPITAL ENCOUNTER (OUTPATIENT)
Dept: ULTRASOUND IMAGING | Facility: HOSPITAL | Age: 41
Discharge: HOME | End: 2021-08-02

## 2021-08-02 DIAGNOSIS — R92.8 ABNORMAL MAMMOGRAM: ICD-10-CM

## 2021-08-02 PROCEDURE — 88173 CYTOPATH EVAL FNA REPORT: CPT | Performed by: NURSE PRACTITIONER

## 2021-08-02 PROCEDURE — 19081 BX BREAST 1ST LESION STRTCTC: CPT | Performed by: RADIOLOGY

## 2021-08-02 PROCEDURE — 88184 FLOWCYTOMETRY/ TC 1 MARKER: CPT

## 2021-08-02 PROCEDURE — 88305 TISSUE EXAM BY PATHOLOGIST: CPT | Performed by: NURSE PRACTITIONER

## 2021-08-02 PROCEDURE — 88185 FLOWCYTOMETRY/TC ADD-ON: CPT

## 2021-08-02 PROCEDURE — 10005 FNA BX W/US GDN 1ST LES: CPT | Performed by: RADIOLOGY

## 2021-08-02 PROCEDURE — A4648 IMPLANTABLE TISSUE MARKER: HCPCS

## 2021-08-02 PROCEDURE — 77065 DX MAMMO INCL CAD UNI: CPT | Performed by: RADIOLOGY

## 2021-08-02 PROCEDURE — 25010000003 LIDOCAINE 1 % SOLUTION: Performed by: RADIOLOGY

## 2021-08-02 RX ORDER — LIDOCAINE HYDROCHLORIDE AND EPINEPHRINE 10; 10 MG/ML; UG/ML
20 INJECTION, SOLUTION INFILTRATION; PERINEURAL ONCE
Status: COMPLETED | OUTPATIENT
Start: 2021-08-02 | End: 2021-08-02

## 2021-08-02 RX ORDER — LIDOCAINE HYDROCHLORIDE 10 MG/ML
5 INJECTION, SOLUTION INFILTRATION; PERINEURAL ONCE
Status: COMPLETED | OUTPATIENT
Start: 2021-08-02 | End: 2021-08-02

## 2021-08-02 RX ADMIN — LIDOCAINE HYDROCHLORIDE AND EPINEPHRINE 17 ML: 10; 10 INJECTION, SOLUTION INFILTRATION; PERINEURAL at 11:25

## 2021-08-02 RX ADMIN — LIDOCAINE HYDROCHLORIDE 5 ML: 10 INJECTION, SOLUTION INFILTRATION; PERINEURAL at 11:23

## 2021-08-02 RX ADMIN — LIDOCAINE HYDROCHLORIDE 5 ML: 10 INJECTION, SOLUTION INFILTRATION; PERINEURAL at 10:37

## 2021-08-02 NOTE — PROGRESS NOTES
Alert and orientated. Denies discomfort., No active bleeding., Steri-strips not visualized, gauze dressing intact. Ice packs given. Verbalizes and demonstrates understanding of post-care instructions, written copy given.

## 2021-08-03 LAB
CYTO UR: NORMAL
LAB AP CASE REPORT: NORMAL
LAB AP CLINICAL INFORMATION: NORMAL
LAB AP DIAGNOSIS COMMENT: NORMAL
PATH REPORT.FINAL DX SPEC: NORMAL
PATH REPORT.GROSS SPEC: NORMAL

## 2021-08-04 ENCOUNTER — TELEPHONE (OUTPATIENT)
Dept: MAMMOGRAPHY | Facility: HOSPITAL | Age: 41
End: 2021-08-04

## 2021-08-04 NOTE — TELEPHONE ENCOUNTER
Pt notified of pathology results. Notified patient will be called when cytology results and recommendation are available. Verbalizes understanding. States having some discomfort, denies needing analgesics.  Denies signs and symptoms of infection. Questions answered, verbalized understanding.

## 2021-08-05 ENCOUNTER — TELEMEDICINE (OUTPATIENT)
Dept: BARIATRICS/WEIGHT MGMT | Facility: CLINIC | Age: 41
End: 2021-08-05

## 2021-08-05 DIAGNOSIS — K21.9 GASTROESOPHAGEAL REFLUX DISEASE, UNSPECIFIED WHETHER ESOPHAGITIS PRESENT: Primary | ICD-10-CM

## 2021-08-05 PROCEDURE — 99423 OL DIG E/M SVC 21+ MIN: CPT | Performed by: SURGERY

## 2021-08-05 NOTE — H&P (VIEW-ONLY)
[facility] Bariatric Surgery  [address]  [address]  [facility] [address] [phone]        Patient Name: Fuad Nielsen.  YOB: 1980      Date of Visit: 2021      Reason for Visit:  GERD    HPI:  Fuad Nielsen is a 41 y.o. female s/p s/p lap robotic LSG 10/2012 w/ Dr. Goddard.    She is pursuing revision options.  She has infrequent reflux but only with dietary indiscretion (acidic foods).  She has bad seasonal allergies and is recently on Flonase.    She had a recent thyroid biopsy and breast biopsy, and both were benign.    No other changes to medical hx.    UGI at [facility] IMPRESSION:  Upper GI series, consistent with previous gastric sleeve  surgery otherwise unremarkable. No evidence of obstruction or  significant mucosal irregularity.       Past Medical History:   Diagnosis Date   • Abnormal Pap smear of cervix    • Anemia    • Atrial fibrillation (CMS/HCC)     after COVID, did not require cardioversion - on BBlocker + ASA - follows w/ Dr. Harris   • COVID-19     2020   • Diabetes (CMS/HCC) 2009    dx in 2009, never on insulin, now diet controlled, most recent A1C 6.1   • Dyspepsia    • Dyspnea on exertion    • Fatigue    • Fibroids    • Goiter    • History of Helicobacter pylori infection     treated remotely   • Hypertension    • Iron deficiency    • Morbid obesity with BMI of 50.0-59.9, adult (CMS/HCC)    • Sleep apnea     newly dx, awaiting device   • Sleep apnea    • Thyroid disorder     multinodular - following w/ Endocrinology   • Vitamin D deficiency      Past Surgical History:   Procedure Laterality Date   •  SECTION  2018   • COLONOSCOPY     • COLPOSCOPY W/ BIOPSY / CURETTAGE     • ENDOSCOPY     • GASTRIC SLEEVE LAPAROSCOPIC  10/2012    [facility]   • OOPHORECTOMY Right     benign cysts   • US GUIDED FINE NEEDLE ASPIRATION  2021   • WISDOM TOOTH EXTRACTION       No outpatient medications have been marked as taking  for the 8/5/21 encounter (Telemedicine) with Gina Nuñez MD.     No Known Allergies    Social History     Socioeconomic History   • Marital status: Single     Spouse name: Not on file   • Number of children: Not on file   • Years of education: Not on file   • Highest education level: Not on file   Tobacco Use   • Smoking status: Former Smoker     Packs/day: 3.00     Types: Cigarettes     Quit date: 08/2018     Years since quitting: 3.0   • Smokeless tobacco: Never Used   Vaping Use   • Vaping Use: Never used   Substance and Sexual Activity   • Alcohol use: Yes     Comment: 1x per week, 2 drinks, x10yrs   • Drug use: Never   • Sexual activity: Yes     Partners: Male     Birth control/protection: Nexplanon       There were no vitals filed for this visit.  Weight    There is no height or weight on file to calculate BMI.    Physical Exam  Constitutional:       General: She is not in acute distress.     Appearance: Normal appearance. She is not ill-appearing.   HENT:      Head: Normocephalic and atraumatic.      Nose: Nose normal.   Eyes:      General: No scleral icterus.     Extraocular Movements: Extraocular movements intact.      Conjunctiva/sclera: Conjunctivae normal.      Pupils: Pupils are equal, round, and reactive to light.   Pulmonary:      Effort: Pulmonary effort is normal. No respiratory distress.   Skin:     Coloration: Skin is not pale.   Neurological:      Mental Status: She is alert and oriented to person, place, and time.   Psychiatric:         Mood and Affect: Mood normal.         Behavior: Behavior normal.           Assessment:      ICD-10-CM ICD-9-CM   1. Gastroesophageal reflux disease, unspecified whether esophagitis present  K21.9 530.81       Plan:      EGD with biopsy.  Pt instructed to adhere to NPO after midnight, full liquids for 24 hours before procedure.      The risks and benefits of the upper endoscopy were discussed with the patient in detail and all questions were answered.   Possibility of perforation, bleeding, aspiration, and anesthesia reaction were reviewed.  Patient agrees to proceed.    This visit was conducted as a video visit, in an effort to limit spread of the novel coronavirus during the 8941-5974 pandemic.  The patient gave consent.

## 2021-08-05 NOTE — PROGRESS NOTES
[facility] Bariatric Surgery  [address]  [address]  [facility] [address] [phone]        Patient Name: Fuad Nielsen.  YOB: 1980      Date of Visit: 2021      Reason for Visit:  GERD    HPI:  Fuad Nielsen is a 41 y.o. female s/p s/p lap robotic LSG 10/2012 w/ Dr. Goddard.    She is pursuing revision options.  She has infrequent reflux but only with dietary indiscretion (acidic foods).  She has bad seasonal allergies and is recently on Flonase.    She had a recent thyroid biopsy and breast biopsy, and both were benign.    No other changes to medical hx.    UGI at [facility] IMPRESSION:  Upper GI series, consistent with previous gastric sleeve  surgery otherwise unremarkable. No evidence of obstruction or  significant mucosal irregularity.       Past Medical History:   Diagnosis Date   • Abnormal Pap smear of cervix    • Anemia    • Atrial fibrillation (CMS/HCC)     after COVID, did not require cardioversion - on BBlocker + ASA - follows w/ Dr. Harris   • COVID-19     2020   • Diabetes (CMS/HCC) 2009    dx in 2009, never on insulin, now diet controlled, most recent A1C 6.1   • Dyspepsia    • Dyspnea on exertion    • Fatigue    • Fibroids    • Goiter    • History of Helicobacter pylori infection     treated remotely   • Hypertension    • Iron deficiency    • Morbid obesity with BMI of 50.0-59.9, adult (CMS/HCC)    • Sleep apnea     newly dx, awaiting device   • Sleep apnea    • Thyroid disorder     multinodular - following w/ Endocrinology   • Vitamin D deficiency      Past Surgical History:   Procedure Laterality Date   •  SECTION  2018   • COLONOSCOPY     • COLPOSCOPY W/ BIOPSY / CURETTAGE     • ENDOSCOPY     • GASTRIC SLEEVE LAPAROSCOPIC  10/2012    [facility]   • OOPHORECTOMY Right     benign cysts   • US GUIDED FINE NEEDLE ASPIRATION  2021   • WISDOM TOOTH EXTRACTION       No outpatient medications have been marked as taking  for the 8/5/21 encounter (Telemedicine) with Gina Nuñez MD.     No Known Allergies    Social History     Socioeconomic History   • Marital status: Single     Spouse name: Not on file   • Number of children: Not on file   • Years of education: Not on file   • Highest education level: Not on file   Tobacco Use   • Smoking status: Former Smoker     Packs/day: 3.00     Types: Cigarettes     Quit date: 08/2018     Years since quitting: 3.0   • Smokeless tobacco: Never Used   Vaping Use   • Vaping Use: Never used   Substance and Sexual Activity   • Alcohol use: Yes     Comment: 1x per week, 2 drinks, x10yrs   • Drug use: Never   • Sexual activity: Yes     Partners: Male     Birth control/protection: Nexplanon       There were no vitals filed for this visit.  Weight    There is no height or weight on file to calculate BMI.    Physical Exam  Constitutional:       General: She is not in acute distress.     Appearance: Normal appearance. She is not ill-appearing.   HENT:      Head: Normocephalic and atraumatic.      Nose: Nose normal.   Eyes:      General: No scleral icterus.     Extraocular Movements: Extraocular movements intact.      Conjunctiva/sclera: Conjunctivae normal.      Pupils: Pupils are equal, round, and reactive to light.   Pulmonary:      Effort: Pulmonary effort is normal. No respiratory distress.   Skin:     Coloration: Skin is not pale.   Neurological:      Mental Status: She is alert and oriented to person, place, and time.   Psychiatric:         Mood and Affect: Mood normal.         Behavior: Behavior normal.           Assessment:      ICD-10-CM ICD-9-CM   1. Gastroesophageal reflux disease, unspecified whether esophagitis present  K21.9 530.81       Plan:      EGD with biopsy.  Pt instructed to adhere to NPO after midnight, full liquids for 24 hours before procedure.      The risks and benefits of the upper endoscopy were discussed with the patient in detail and all questions were answered.   Possibility of perforation, bleeding, aspiration, and anesthesia reaction were reviewed.  Patient agrees to proceed.    This visit was conducted as a video visit, in an effort to limit spread of the novel coronavirus during the 7778-3201 pandemic.  The patient gave consent.

## 2021-08-06 ENCOUNTER — TELEPHONE (OUTPATIENT)
Dept: MAMMOGRAPHY | Facility: HOSPITAL | Age: 41
End: 2021-08-06

## 2021-08-06 LAB
LAB AP CASE REPORT: NORMAL
LAB AP FLOW CYTOMETRY SUMMARY: NORMAL
PATH REPORT.FINAL DX SPEC: NORMAL

## 2021-08-06 NOTE — TELEPHONE ENCOUNTER
Attempted to notify patient of cytology results. Left message on patient's voicemail to return my call.

## 2021-08-10 ENCOUNTER — TELEPHONE (OUTPATIENT)
Dept: MAMMOGRAPHY | Facility: HOSPITAL | Age: 41
End: 2021-08-10

## 2021-08-10 NOTE — TELEPHONE ENCOUNTER
Pt notified Dr VAN Peoples's follow-up recommendation now available. Verbalizes understanding. Patient requests surgical consult with Dr LUISA Kendall. She will be notified of appointment when it can be scheduled with [facility]. Patient verbalized understanding. Encouraged pt to call back if she has further questions.Dr VAN Peoples notified via E-basket message.

## 2021-08-11 ENCOUNTER — TELEPHONE (OUTPATIENT)
Dept: MAMMOGRAPHY | Facility: HOSPITAL | Age: 41
End: 2021-08-11

## 2021-08-11 NOTE — TELEPHONE ENCOUNTER
Pt notified of surgical consult appointment with Dr. Kendall on  9.23.21 @ 0188. Pt given office contact & location information. Told to bring photo ID, list of prescription & OTC medications, insurance information, must wear a mask & come to visit alone unless assistance is needed. Encouraged pt to call back or contact surgeon's office with further questions. Pt verbalized understanding.

## 2021-08-17 ENCOUNTER — PRE-ADMISSION TESTING (OUTPATIENT)
Dept: PREADMISSION TESTING | Facility: HOSPITAL | Age: 41
End: 2021-08-17

## 2021-08-17 LAB
DEPRECATED RDW RBC AUTO: 44.6 FL (ref 37–54)
ERYTHROCYTE [DISTWIDTH] IN BLOOD BY AUTOMATED COUNT: 14.3 % (ref 12.3–15.4)
HCT VFR BLD AUTO: 38 % (ref 34–46.6)
HGB BLD-MCNC: 11.4 G/DL (ref 12–15.9)
MCH RBC QN AUTO: 25.7 PG (ref 26.6–33)
MCHC RBC AUTO-ENTMCNC: 30 G/DL (ref 31.5–35.7)
MCV RBC AUTO: 85.8 FL (ref 79–97)
PLATELET # BLD AUTO: 250 10*3/MM3 (ref 140–450)
PMV BLD AUTO: 10.7 FL (ref 6–12)
POTASSIUM SERPL-SCNC: 4.2 MMOL/L (ref 3.5–5.2)
QT INTERVAL: 396 MS
QTC INTERVAL: 427 MS
RBC # BLD AUTO: 4.43 10*6/MM3 (ref 3.77–5.28)
SARS-COV-2 RNA PNL SPEC NAA+PROBE: NOT DETECTED
WBC # BLD AUTO: 4.53 10*3/MM3 (ref 3.4–10.8)

## 2021-08-17 PROCEDURE — 36415 COLL VENOUS BLD VENIPUNCTURE: CPT

## 2021-08-17 PROCEDURE — 84132 ASSAY OF SERUM POTASSIUM: CPT

## 2021-08-17 PROCEDURE — 85027 COMPLETE CBC AUTOMATED: CPT

## 2021-08-17 PROCEDURE — C9803 HOPD COVID-19 SPEC COLLECT: HCPCS

## 2021-08-17 PROCEDURE — U0004 COV-19 TEST NON-CDC HGH THRU: HCPCS

## 2021-08-17 PROCEDURE — 93005 ELECTROCARDIOGRAM TRACING: CPT

## 2021-08-17 PROCEDURE — 93010 ELECTROCARDIOGRAM REPORT: CPT | Performed by: INTERNAL MEDICINE

## 2021-08-17 NOTE — PAT
It was noted during Pre Admission Testing that patient was wearing some form of fingernail polish (gel/regular) and/or acrylic/artificial nails.  Patient was told that polish and/or artificial nails must be removed for surgery.  If a patient had recent manicure, and would rather not remove polish or artificial nails. Then the minimum requirement is that the polish/artificial nails must be removed from the middle finger on each hand.  Patient verbalized understanding.    If patient was having surgery on an upper extremity, then the patient was instructed that fingernail polish/artificial fingernails must be removed for surgery.  NO EXCEPTIONS.  Patient verbalized understanding.    If patient was having surgery on a lower extremity, then the patient was instructed that toenail polish on both extremities must be removed for surgery.  NO EXCEPTIONS. Patient verbalized understanding.    Patient instructed to bring CPAP mask and tubing to the hospital for overnight stay.  Explained that it is not necessary to bring their CPAP machine to the hospital instead a CPAP machine will be provided for use by the hospital. If patient knows their CPAP settings, those settings will be implemented.  If not, the CPAP machine will be utilized on the auto setting using their mask and tubing.    Patient verbalized understanding.    COVID TEST PERFORMED IN [facility] TODAY.

## 2021-08-19 ENCOUNTER — ANESTHESIA (OUTPATIENT)
Dept: GASTROENTEROLOGY | Facility: HOSPITAL | Age: 41
End: 2021-08-19

## 2021-08-19 ENCOUNTER — TELEPHONE (OUTPATIENT)
Dept: BARIATRICS/WEIGHT MGMT | Facility: CLINIC | Age: 41
End: 2021-08-19

## 2021-08-19 ENCOUNTER — HOSPITAL ENCOUNTER (OUTPATIENT)
Facility: HOSPITAL | Age: 41
Setting detail: HOSPITAL OUTPATIENT SURGERY
Discharge: HOME | End: 2021-08-19
Attending: SURGERY | Admitting: SURGERY

## 2021-08-19 ENCOUNTER — ANESTHESIA EVENT (OUTPATIENT)
Dept: GASTROENTEROLOGY | Facility: HOSPITAL | Age: 41
End: 2021-08-19

## 2021-08-19 VITALS
RESPIRATION RATE: 18 BRPM | OXYGEN SATURATION: 97 % | WEIGHT: 293 LBS | BODY MASS INDEX: 53.31 KG/M2 | HEART RATE: 95 BPM | SYSTOLIC BLOOD PRESSURE: 119 MMHG | TEMPERATURE: 97.8 F | DIASTOLIC BLOOD PRESSURE: 80 MMHG

## 2021-08-19 DIAGNOSIS — R10.13 DYSPEPSIA: Primary | ICD-10-CM

## 2021-08-19 DIAGNOSIS — R10.13 DYSPEPSIA: ICD-10-CM

## 2021-08-19 LAB
B-HCG UR QL: NEGATIVE
GLUCOSE BLDC GLUCOMTR-MCNC: 90 MG/DL (ref 70–130)
INTERNAL NEGATIVE CONTROL: NORMAL
INTERNAL POSITIVE CONTROL: NORMAL
Lab: NORMAL

## 2021-08-19 PROCEDURE — 25010000002 PROPOFOL 10 MG/ML EMULSION: Performed by: NURSE ANESTHETIST, CERTIFIED REGISTERED

## 2021-08-19 PROCEDURE — 81025 URINE PREGNANCY TEST: CPT | Performed by: ANESTHESIOLOGY

## 2021-08-19 PROCEDURE — 88305 TISSUE EXAM BY PATHOLOGIST: CPT | Performed by: SURGERY

## 2021-08-19 PROCEDURE — 43239 EGD BIOPSY SINGLE/MULTIPLE: CPT | Performed by: SURGERY

## 2021-08-19 PROCEDURE — 82962 GLUCOSE BLOOD TEST: CPT

## 2021-08-19 RX ORDER — ACETAMINOPHEN 325 MG/1
650 TABLET ORAL ONCE AS NEEDED
Status: DISCONTINUED | OUTPATIENT
Start: 2021-08-19 | End: 2021-08-19 | Stop reason: HOSPADM

## 2021-08-19 RX ORDER — ACETAMINOPHEN 500 MG
1000 TABLET ORAL ONCE
Status: COMPLETED | OUTPATIENT
Start: 2021-08-19 | End: 2021-08-19

## 2021-08-19 RX ORDER — LIDOCAINE HYDROCHLORIDE 10 MG/ML
INJECTION, SOLUTION EPIDURAL; INFILTRATION; INTRACAUDAL; PERINEURAL AS NEEDED
Status: DISCONTINUED | OUTPATIENT
Start: 2021-08-19 | End: 2021-08-19 | Stop reason: SURG

## 2021-08-19 RX ORDER — SODIUM CHLORIDE 9 MG/ML
150 INJECTION, SOLUTION INTRAVENOUS CONTINUOUS
Status: DISCONTINUED | OUTPATIENT
Start: 2021-08-19 | End: 2021-08-19 | Stop reason: HOSPADM

## 2021-08-19 RX ORDER — PROPOFOL 10 MG/ML
VIAL (ML) INTRAVENOUS AS NEEDED
Status: DISCONTINUED | OUTPATIENT
Start: 2021-08-19 | End: 2021-08-19 | Stop reason: SURG

## 2021-08-19 RX ORDER — IPRATROPIUM BROMIDE AND ALBUTEROL SULFATE 2.5; .5 MG/3ML; MG/3ML
3 SOLUTION RESPIRATORY (INHALATION) ONCE AS NEEDED
Status: DISCONTINUED | OUTPATIENT
Start: 2021-08-19 | End: 2021-08-19 | Stop reason: HOSPADM

## 2021-08-19 RX ORDER — SODIUM CHLORIDE 0.9 % (FLUSH) 0.9 %
3-10 SYRINGE (ML) INJECTION AS NEEDED
Status: DISCONTINUED | OUTPATIENT
Start: 2021-08-19 | End: 2021-08-19 | Stop reason: HOSPADM

## 2021-08-19 RX ORDER — ONDANSETRON 2 MG/ML
4 INJECTION INTRAMUSCULAR; INTRAVENOUS ONCE AS NEEDED
Status: DISCONTINUED | OUTPATIENT
Start: 2021-08-19 | End: 2021-08-19 | Stop reason: HOSPADM

## 2021-08-19 RX ADMIN — ACETAMINOPHEN 1000 MG: 500 TABLET, FILM COATED ORAL at 11:54

## 2021-08-19 RX ADMIN — TOPICAL ANESTHETIC 2 SPRAY: 200 SPRAY DENTAL; PERIODONTAL at 11:15

## 2021-08-19 RX ADMIN — SODIUM CHLORIDE, PRESERVATIVE FREE 10 ML: 5 INJECTION INTRAVENOUS at 10:50

## 2021-08-19 RX ADMIN — LIDOCAINE HYDROCHLORIDE 100 MG: 10 INJECTION, SOLUTION EPIDURAL; INFILTRATION; INTRACAUDAL; PERINEURAL at 11:19

## 2021-08-19 RX ADMIN — PROPOFOL 150 MG: 10 INJECTION, EMULSION INTRAVENOUS at 11:21

## 2021-08-19 RX ADMIN — PROPOFOL 50 MG: 10 INJECTION, EMULSION INTRAVENOUS at 11:25

## 2021-08-19 RX ADMIN — SODIUM CHLORIDE 500 ML: 9 INJECTION, SOLUTION INTRAVENOUS at 10:50

## 2021-08-19 RX ADMIN — PROPOFOL 50 MG: 10 INJECTION, EMULSION INTRAVENOUS at 11:29

## 2021-08-19 NOTE — TELEPHONE ENCOUNTER
Anant Goddard MD Hitt, Pastora JACOBS PA-C  Please order a gallbladder US (no HIDA needed regardless) and have her see me in the office to discuss options, thanks!

## 2021-08-19 NOTE — BRIEF OP NOTE
ESOPHAGOGASTRODUODENOSCOPY WITH BIOPSY  Progress Note    Fuad Nielsen  8/19/2021    Pre-op Diagnosis:   Dyspepsia [R10.13]       Post-Op Diagnosis Codes:     * Dyspepsia [R10.13]     * H/O bariatric surgery [Z98.84]    Procedure/CPT® Codes:  AZ LAP,ESOPHAGUS,OTHER PROC [11665]      Procedure(s):  ESOPHAGOGASTRODUODENOSCOPY WITH BIOPSY    Surgeon(s):  Anant Goddard MD    Anesthesia: Monitored Anesthesia Care    Staff:   Circulator: Batsheva Freeman RN  Endo Technician: Eileen Richter         Estimated Blood Loss: none    Urine Voided: * No values recorded between 8/19/2021 11:17 AM and 8/19/2021 11:25 AM *    Specimens:                A: antrum, DE          Drains: * No LDAs found *    Findings:     Complications: none          Anant Goddard MD     Date: 8/19/2021  Time: 11:25 EDT

## 2021-08-19 NOTE — OP NOTE
Preoperative Diagnosis: Dyspepsia status post laparoscopic robotic sleeve gastrectomy October 2012    Postoperative Diagnosis: Same    Procedure:   EGD with biopsy x 2    Surgeon:  Rupesh    Anesth:  MAC    Specimens: antrum, DE    Complics:  None    Findings: Fairly unremarkable post sleeve upper endoscopy.  Mild dilatation of the proximal sleeve.  Carditis without evidence of hiatal hernia.  Z-line 39 cm    Indications:   This is a 41-year-old super morbidly obese female known to me status post laparoscopic robotic assisted sleeve gastrectomy in October 2012.  Presurgery weight 386 pounds, lowest weight achieved 280 pounds.  Currently 361 pounds with a BMI of 53.31.  She is interested in revisional metabolic and bariatric surgical options.  Occasional mild reflux no dysphagia or abdominal pain.  Please see our office notes.   Risks, benefits and alternative therapies were discussed and the patient wishes to proceed with diagnostic possible therapeutic upper endoscopy    Operative Technique:  The patient was brought to the endoscopy suite and placed supine upon the stretcher. A bite block was placed.  The patient was sedated by the anesthesiology staff and the standard flexible endoscope was advanced under direct visualization into the posterior pharynx, vocal cords appeared normal and the esophagus was intubated and the endoscope advanced easily through the esophagus, and into the gastric cavity. The pylorus was readily identified and intubated and the endoscope advanced into the first and second portions of the duodenum.  The endoscope was withdrawn to the antrum and a biopsy was obtained.  Retroflexed examination was performed.   The endoscope was withdrawn to the Z line and photodocumentation obtained.  The endoscope was slowly withdrawn, no new abnormalities noted.      Prox esoph - no strictures, distention, retained secretions or tertiary spasms.  Distal esoph - no hiatal hernia, George's or gross  esophagitis.  Carditis noted.  Gastric mucosa grossly normal, no significant gastritis noted.  Mild dilatation of the fundus and body, no bile in the stomach.  Pylorus not deformed or in spasm.  Duodenum unremarkable with bile in the duodenum.  Retroflexed exam no HH or other abnormalities, no redundant cardia noted.  Zline 39 cm, bx above the zline obtained.        The patient tolerated the procedure well without complication and was taken to the recovery room in stable condition.

## 2021-08-19 NOTE — DISCHARGE INSTRUCTIONS
Upper Endoscopy, Adult, Care After  This sheet gives you information about how to care for yourself after your procedure. Your health care provider may also give you more specific instructions. If you have problems or questions, contact your health care provider.  What can I expect after the procedure?  After the procedure, it is common to have:  · A sore throat.  · Mild stomach pain or discomfort.  · Bloating.  · Nausea.  Follow these instructions at home:    · Follow instructions from your health care provider about what to eat or drink after your procedure.  · Return to your normal activities as told by your health care provider. Ask your health care provider what activities are safe for you.  · Take over-the-counter and prescription medicines only as told by your health care provider.  · If you were given a sedative during the procedure, it can affect you for several hours. Do not drive or operate machinery until your health care provider says that it is safe.  · Keep all follow-up visits as told by your health care provider. This is important.  Contact a health care provider if you have:  · A sore throat that lasts longer than one day.  · Trouble swallowing.  Get help right away if:  · You vomit blood or your vomit looks like coffee grounds.  · You have:  ? A fever.  ? Bloody, black, or tarry stools.  ? A severe sore throat or you cannot swallow.  ? Difficulty breathing.  ? Severe pain in your chest or abdomen.  Summary  · After the procedure, it is common to have a sore throat, mild stomach discomfort, bloating, and nausea.  · If you were given a sedative during the procedure, it can affect you for several hours. Do not drive or operate machinery until your health care provider says that it is safe.  · Follow instructions from your health care provider about what to eat or drink after your procedure.  · Return to your normal activities as told by your health care provider.  This information is not intended to  replace advice given to you by your health care provider. Make sure you discuss any questions you have with your health care provider.  Document Revised: 12/15/2020 Document Reviewed: 05/20/2019  [employer] Patient Education © 2021 [employer]  Moderate Conscious Sedation, Adult  Sedation is the use of medicines to promote relaxation and to relieve discomfort and anxiety. Moderate conscious sedation is a type of sedation. Under moderate conscious sedation, you are less alert than normal, but you are still able to respond to instructions, touch, or both.  Moderate conscious sedation is used during short medical and dental procedures. It is milder than deep sedation, which is a type of sedation under which you cannot be easily woken up. It is also milder than general anesthesia, which is the use of medicines to make you unconscious. Moderate conscious sedation allows you to return to your regular activities sooner.  Tell a health care provider about:  · Any allergies you have.  · All medicines you are taking, including vitamins, herbs, eye drops, creams, and over-the-counter medicines.  · Any use of steroids. This includes steroids taken by mouth or as a cream.  · Any problems you or family members have had with sedatives and anesthetic medicines.  · Any blood disorders you have.  · Any surgeries you have had.  · Any medical conditions you have, such as sleep apnea.  · Whether you are pregnant or may be pregnant.  · Any use of cigarettes, alcohol, marijuana, or drugs.  What are the risks?  Generally, this is a safe procedure. However, problems may occur, including:  · Getting too much medicine (oversedation).  · Nausea.  · Allergic reaction to medicines.  · Trouble breathing. If this happens, a breathing tube may be used. It will be removed when you are awake and breathing on your own.  · Heart trouble.  · Lung trouble.  · Confusion that gets better with time (emergence delirium).  What happens before the  procedure?  Staying hydrated  Follow instructions from your health care provider about hydration, which may include:  · Up to 2 hours before the procedure - you may continue to drink clear liquids, such as water, clear fruit juice, black coffee, and plain tea.  Eating and drinking restrictions  Follow instructions from your health care provider about eating and drinking, which may include:  · 8 hours before the procedure - stop eating heavy meals or foods, such as meat, fried foods, or fatty foods.  · 6 hours before the procedure - stop eating light meals or foods, such as toast or cereal.  · 6 hours before the procedure - stop drinking milk or drinks that contain milk.  · 2 hours before the procedure - stop drinking clear liquids.  Medicines  Ask your health care provider about:  · Changing or stopping your regular medicines. This is especially important if you are taking diabetes medicines or blood thinners.  · Taking medicines such as aspirin and ibuprofen. These medicines can thin your blood. Do not take these medicines unless your health care provider tells you to take them.  · Taking over-the-counter medicines, vitamins, herbs, and supplements.  Tests and exams  · You will have a physical exam.  · You may have blood tests done to show how well:  ? Your kidneys and liver work.  ? Your blood clots.  General instructions  · Plan to have someone take you home from the hospital or clinic.  · If you will be going home right after the procedure, plan to have someone with you for 24 hours.  What happens during the procedure?    · You will be given the sedative. The sedative may be given:  ? As a pill that you will swallow. It can also be inserted into the rectum.  ? As a spray through the nose.  ? As an injection into the muscle.  ? As an injection into the vein through an IV.  · You may be given oxygen as needed.  · Your breathing, heart rate, and blood pressure will be monitored during the procedure.  · The medical  or dental procedure will be done.  The procedure may vary among health care providers and hospitals.  What happens after the procedure?  · Your blood pressure, heart rate, breathing rate, and blood oxygen level will be monitored until you leave the hospital or clinic.  · You will get fluids through your IV if needed.  · Do not drive or operate machinery until your health care provider says that it is safe.  Summary  · Sedation is the use of medicines to promote relaxation and to relieve discomfort and anxiety. Moderate conscious sedation is a type of sedation that is used during short medical and dental procedures.  · Tell the health care provider about any medical conditions that you have and about all the medicines that you are taking.  · You will be given the sedative as a pill, a spray through the nose, an injection into the muscle, or an injection into the vein through an IV. Vital signs are monitored during the sedation.  · Moderate conscious sedation allows you to return to your regular activities sooner.  This information is not intended to replace advice given to you by your health care provider. Make sure you discuss any questions you have with your health care provider.  Document Revised: 11/12/2020 Document Reviewed: 11/12/2020  [employer] Patient Education © 2021 [employer]

## 2021-08-19 NOTE — ANESTHESIA PREPROCEDURE EVALUATION
Anesthesia Evaluation     Patient summary reviewed   no history of anesthetic complications:  NPO Solid Status: > 8 hours  NPO Liquid Status: > 8 hours           Airway   Mallampati: I  TM distance: >3 FB  Neck ROM: full  Large neck circumference and No difficulty expected  Dental - normal exam     Pulmonary - normal exam   (+) a smoker Former, shortness of breath, sleep apnea on CPAP,   Cardiovascular - normal exam  Exercise tolerance: good (4-7 METS)    ECG reviewed  Patient on routine beta blocker    (+) hypertension, dysrhythmias Atrial Fib,   (-) angina, CHF, cardiac stents, DVT    ROS comment: 1/2/2021- lvef 60 , nl rvsf/sz, nl valves    Neuro/Psych  GI/Hepatic/Renal/Endo    (+) morbid obesity,  diabetes mellitus type 2,   (-) liver disease, no renal disease    Musculoskeletal     Abdominal    Substance History   (+) alcohol use,   (-) drug use     OB/GYN          Other        ROS/Med Hx Other: hgb 11.4 k 4.2  2012 hx LSG  SOA since covid 12/2021  No N/V/gerd this AM                  Anesthesia Plan    ASA 3     general   (Propofol)  intravenous induction     Anesthetic plan, all risks, benefits, and alternatives have been provided, discussed and informed consent has been obtained with: patient.    Plan discussed with CRNA.

## 2021-08-19 NOTE — ANESTHESIA POSTPROCEDURE EVALUATION
How Severe Is Your Skin Lesion?: mild Patient: Fuad Nielsen    Procedure Summary     Date: 08/19/21 Room / Location:  [facility]    Anesthesia Start: 1117 Anesthesia Stop: 1132    Procedure: ESOPHAGOGASTRODUODENOSCOPY WITH BIOPSY (N/A ) Diagnosis:       Dyspepsia      H/O bariatric surgery      (Dyspepsia [R10.13])    Surgeons: Anant Goddard MD Provider: Lotus Oscar DO    Anesthesia Type: general ASA Status: 3          Anesthesia Type: general    Vitals  No vitals data found for the desired time range.          Post Anesthesia Care and Evaluation    Patient location during evaluation: PACU  Patient participation: complete - patient participated  Level of consciousness: awake and alert  Pain management: adequate  Airway patency: patent  Anesthetic complications: No anesthetic complications  PONV Status: none  Cardiovascular status: hemodynamically stable and acceptable  Respiratory status: nonlabored ventilation, acceptable and nasal cannula  Hydration status: acceptable       Have Your Skin Lesions Been Treated?: not been treated Is This A New Presentation, Or A Follow-Up?: Skin Lesions

## 2021-08-19 NOTE — TELEPHONE ENCOUNTER
Please let patient know Dr. Goddard would like gallbladder US for further eval.  After completed, will set up with appt with Dr. Goddard to discuss options.      Patient notified and verbalized understanding

## 2021-08-20 LAB
CYTO UR: NORMAL
LAB AP CASE REPORT: NORMAL
LAB AP CLINICAL INFORMATION: NORMAL
PATH REPORT.FINAL DX SPEC: NORMAL
PATH REPORT.GROSS SPEC: NORMAL

## 2021-08-23 RX ORDER — ASPIRIN 81 MG/1
81 TABLET ORAL DAILY
Qty: 90 TABLET | Refills: 3
Start: 2021-08-23 | End: 2021-09-21 | Stop reason: SDUPTHER

## 2021-09-08 ENCOUNTER — HOSPITAL ENCOUNTER (OUTPATIENT)
Dept: ULTRASOUND IMAGING | Facility: HOSPITAL | Age: 41
Discharge: HOME | End: 2021-09-08
Admitting: PHYSICIAN ASSISTANT

## 2021-09-08 DIAGNOSIS — R10.13 DYSPEPSIA: ICD-10-CM

## 2021-09-08 PROCEDURE — 76705 ECHO EXAM OF ABDOMEN: CPT

## 2021-09-20 ENCOUNTER — OFFICE VISIT (OUTPATIENT)
Dept: OBSTETRICS AND GYNECOLOGY | Facility: CLINIC | Age: 41
End: 2021-09-20

## 2021-09-20 VITALS
BODY MASS INDEX: 43.4 KG/M2 | HEIGHT: 69 IN | WEIGHT: 293 LBS | RESPIRATION RATE: 16 BRPM | SYSTOLIC BLOOD PRESSURE: 124 MMHG | DIASTOLIC BLOOD PRESSURE: 80 MMHG

## 2021-09-20 DIAGNOSIS — Z30.09 BIRTH CONTROL COUNSELING: ICD-10-CM

## 2021-09-20 DIAGNOSIS — Z86.79 HISTORY OF ATRIAL FIBRILLATION: ICD-10-CM

## 2021-09-20 DIAGNOSIS — D25.9 UTERINE LEIOMYOMA, UNSPECIFIED LOCATION: ICD-10-CM

## 2021-09-20 DIAGNOSIS — Z87.898 HISTORY OF LUMP IN BREAST: ICD-10-CM

## 2021-09-20 DIAGNOSIS — Z01.419 WELL WOMAN EXAM WITH ROUTINE GYNECOLOGICAL EXAM: Primary | ICD-10-CM

## 2021-09-20 DIAGNOSIS — Z11.3 SCREENING EXAMINATION FOR STD (SEXUALLY TRANSMITTED DISEASE): ICD-10-CM

## 2021-09-20 PROCEDURE — 99396 PREV VISIT EST AGE 40-64: CPT | Performed by: OBSTETRICS & GYNECOLOGY

## 2021-09-20 NOTE — PROGRESS NOTES
OFFICE VISIT  NOTE  [facility] CARDIOLOGY      Name: Fuad Nielsen    Date: 2021  MRN:  [id number]  :  1980      REFERRING/PRIMARY PROVIDER:  Mandie Tobar APRN    Chief Complaint   Patient presents with   • Atrial Fibrillation       HPI: Fuad Nielsen is a 41 y.o. female who presents today for follow-up for atrial fibrillation.  History of morbid obesity, BMI 53, hypertension, COVID-19 infection 2020, new diagnosis of PAF 2021.  Normal Holter 2021.  Eliquis discontinued and low-dose aspirin started.  She is doing well on metoprolol, rare palpitations.  She is planning weight loss surgery in the near future.  Denies chest pain or shortness of breath with exertion.  She is exercising 2 to 3 days/week without symptoms.    Past Medical History:   Diagnosis Date   • Abnormal Pap smear of cervix    • Anemia    • Atrial fibrillation (CMS/HCC)     after COVID, did not require cardioversion - on BBlocker + ASA - follows w/ Dr. Harris   • COVID-19     2020   • COVID-19 vaccine series completed     both doses   • Diabetes (CMS/HCC) 2009    dx in , never on insulin, now diet controlled, most recent A1C 6.1   • Dyspepsia    • Dyspnea on exertion    • Fatigue    • Fibroids    • Goiter    • History of Helicobacter pylori infection     treated remotely   • History of transfusion     NO REACTION TO BLOOD   • Hypertension    • Iron deficiency    • Morbid obesity with BMI of 50.0-59.9, adult (CMS/HCC)    • Sleep apnea     CPAP COMPLIANT   • Sleep apnea    • Thyroid disorder     multinodular - following w/ Endocrinology   • Thyroid goiter    • Vitamin D deficiency    • Wears glasses        Past Surgical History:   Procedure Laterality Date   •  SECTION  2018   • COLONOSCOPY     • COLPOSCOPY W/ BIOPSY / CURETTAGE     • ENDOSCOPY     • ENDOSCOPY N/A 2021    Procedure: ESOPHAGOGASTRODUODENOSCOPY WITH BIOPSY;  Surgeon: Anant Goddard,  MD;  Location: [facility];  Service: General;  Laterality: N/A;   • GASTRIC SLEEVE LAPAROSCOPIC  10/2012    [facility]   • OOPHORECTOMY Right 2004    benign cysts   • US GUIDED FINE NEEDLE ASPIRATION  8/2/2021    RIGHT   • WISDOM TOOTH EXTRACTION  2002       Social History     Socioeconomic History   • Marital status: Single     Spouse name: Not on file   • Number of children: Not on file   • Years of education: Not on file   • Highest education level: Not on file   Tobacco Use   • Smoking status: Former Smoker     Years: 4.50     Types: Cigarettes     Quit date: 08/2018     Years since quitting: 3.1   • Smokeless tobacco: Never Used   • Tobacco comment: 1 PACK Q 3 DAYS   Vaping Use   • Vaping Use: Never used   Substance and Sexual Activity   • Alcohol use: Yes     Alcohol/week: 2.0 standard drinks     Types: 1 Glasses of wine, 1 Cans of beer per week     Comment: BEER OR WINE   • Drug use: Never   • Sexual activity: Yes     Partners: Male     Birth control/protection: Nexplanon       Family History   Problem Relation Age of Onset   • Obesity Mother    • Hypertension Mother    • Other Father 39        car accident   • Hypertension Father    • Diabetes Sister    • No Known Problems Maternal Grandfather    • Hypertension Paternal Grandmother    • Heart disease Paternal Grandfather    • Stroke Paternal Grandfather    • Heart attack Paternal Grandfather    • Aneurysm Maternal Grandmother         Brain   • Ovarian cancer Neg Hx    • Breast cancer Neg Hx         ROS:   Constitutional no fever,  no weight loss   Skin no rash, no subcutaneous nodules   Otolaryngeal no difficulty swallowing   Cardiovascular See HPI   Pulmonary no cough, no sputum production   Gastrointestinal no constipation, no diarrhea   Genitourinary no dysuria, no hematuria   Hematologic no easy bruisability, no abnormal bleeding   Musculoskeletal no muscle pain   Neurologic no dizziness, no falls         No Known Allergies      Current Outpatient  "Medications:   •  amLODIPine (NORVASC) 10 MG tablet, Take 1 tablet by mouth Daily., Disp: 30 tablet, Rfl: 6  •  aspirin (aspirin) 81 MG EC tablet, Take 1 tablet by mouth Daily., Disp: 90 tablet, Rfl: 3  •  Blood Glucose Monitoring Suppl (FREESTYLE FREEDOM LITE) w/Device kit, Use as directed to test blood sugar once daily, Disp: 1 each, Rfl: 0  •  ciclopirox (Penlac) 8 % solution, Apply  topically to the appropriate area as directed Every Night. (Patient taking differently: Apply 1 application topically to the appropriate area as directed At Night As Needed (TOE NAIL FUNGUS).), Disp: 6.6 mL, Rfl: 1  •  Etonogestrel (NEXPLANON SC), Inject  under the skin into the appropriate area as directed., Disp: , Rfl:   •  fluticasone (Flonase) 50 MCG/ACT nasal spray, 2 sprays into the nostril(s) as directed by provider Daily. (Patient taking differently: 2 sprays into the nostril(s) as directed by provider Daily As Needed.), Disp: 16 g, Rfl: 11  •  Lancets misc, Use as directed to test blood sugar once Daily., Disp: 100 each, Rfl: 11  •  lisinopril (PRINIVIL,ZESTRIL) 10 MG tablet, Take 1 tablet by mouth Daily., Disp: 30 tablet, Rfl: 6  •  metoprolol tartrate (LOPRESSOR) 25 MG tablet, Take 0.5 tablets by mouth Every 12 (Twelve) Hours., Disp: 30 tablet, Rfl: 6  •  naproxen (NAPROSYN) 500 MG tablet, Take 1 tablet by mouth 2 (Two) Times a Day As Needed for Mild Pain With food (Patient taking differently: Take 500 mg by mouth 2 (Two) Times a Day As Needed for Mild Pain . HAS NOT HAD RECENTLY), Disp: 60 tablet, Rfl: 3    Vitals:    09/21/21 0838   BP: 126/78   BP Location: Right arm   Patient Position: Sitting   Pulse: 85   SpO2: 98%   Weight: (!) 164 kg (361 lb)   Height: 175.3 cm (69\")     Body mass index is 53.31 kg/m².    PHYSICAL EXAM:    General Appearance:   · well developed  · well nourished  HENT:   · oropharynx moist  · lips not cyanotic  Neck:  · thyroid not enlarged  · supple  Respiratory:  · no respiratory " distress  · normal breath sounds  · no rales  Cardiovascular:  · no jugular venous distention  · regular rhythm  · apical impulse normal  · S1 normal, S2 normal  · no S3, no S4   · no murmur  · no rub, no thrill  · carotid pulses normal; no bruit  · pedal pulses normal  · lower extremity edema: none    Gastrointestinal:   · bowel sounds normal  · non-tender  · no hepatomegaly, no splenomegaly  Musculoskeletal:  · no clubbing of fingers.   · normocephalic, head atraumatic  Skin:   · warm, dry  Psychiatric:  · judgement and insight appropriate  · normal mood and affect    RESULTS:   Procedures    Results for orders placed during the hospital encounter of 01/01/21    Adult Transthoracic Echo Complete W/ Cont if Necessary Per Protocol    Interpretation Summary  · Left ventricular ejection fraction appears to be 61 - 65%. Left ventricular systolic function is normal.  · Left ventricular diastolic function was normal.  · No significant structural or functional valvular disease.        Labs:  Lab Results   Component Value Date    AST 21 01/01/2021    ALT 17 01/01/2021     Lab Results   Component Value Date    HGBA1C 5.9 06/29/2021     No components found for: CREATINININE  No results found for: EGFRIFNONA    Most recent PCP note, imaging tests, and labs reviewed.    ASSESSMENT:  Problem List Items Addressed This Visit        Cardiac and Vasculature    A-fib (CMS/HCC) (Chronic)    Overview     1/2/21 Echo: LVEF 61-65%, structurally normal         Essential hypertension (Chronic)       Endocrine and Metabolic    Type 2 diabetes mellitus without complication, without long-term current use of insulin (CMS/HCC) - Primary (Chronic)    Morbid obesity with BMI of 50.0-59.9, adult (CMS/HCC) (Chronic)       Hematology and Neoplasia    Iron deficiency anemia secondary to inadequate dietary iron intake (Chronic)       Sleep    Moderate obstructive sleep apnea (Severe in REM sleep) (Chronic)       Symptoms and Signs    Fatigue        Other    COVID-19      Other Visit Diagnoses     Pre-operative cardiovascular examination              PLAN:    1.  PAF:  Normal sinus rhythm today  Holter monitor normal 1/2020  [id number] score is 1 for hypertension  Continue aspirin 81 mg daily  Okay to hold prior to surgery  Echocardiogram 1/2021 benign.    2.  Hypertension:  Long-term goal blood pressure is 130/80  Agree with amlodipine, lisinopril, metoprolol.  Low-sodium diet and exercise recommended.    3.  COVID-19 infection 12/2020:  Much improved recently.    4.  Preop cardiovascular assessment:  Patient is overall low risk for adverse cardiovascular events in the perioperative period  Continue metoprolol pre and post surgery.  EKG from 8/17/2021 reviewed, normal.    Okay to proceed with surgery without further cardiac testing.    Return to clinic in 6 months, or sooner as needed.    Thank you for the opportunity to share in the care of your patient; please do not hesitate to call me with any questions.     Allen Harris MD, [facility]  Office: [phone] [address] [place] [address]    09/21/21

## 2021-09-20 NOTE — PROGRESS NOTES
"Subjective   Chief Complaint   Patient presents with   • Gynecologic Exam     Patient wants to discuss Nexplanon. Patient states after she got her 1st dose of the covid vaccine \"moderna\" on 2021 she had irregular vaginal bleeding for about 3 weeks.     Fuad Nielsen is a 41 y.o. year old  presenting to be seen for her annual exam.  Patient is using Nexplanon for birth control and had this inserted after her delivery in .  Patient needs this replaced in 2021.  She has a significant history of hypertension.  Her pregnancy was complicated by uterine fibroids.  Patient would like a transvaginal ultrasound to determine the size of the uterine fibroids at present.  Patient had a right oophorectomy in  due to an ovarian cyst.  Patient has a history of atrial fib and has been on metoprolol for this.  Patient thinks they will stop the metoprolol since she has not had atrial fibrillation in several months.  Patient is interested in STD screening.  Adult Visits - 40 to 64 Years - Counseling/Anticipatory Guidance: Nutrition, physical activity, healthy weight, injury prevention, misuse of tobacco, alcohol and drugs, sexual behavior and STDs, contraception, dental health, mental health, immunizations, screenings for women: Breast cancer and self breast exams.     SEXUAL Hx:  She is currently sexually active.  In the past year there has not been new sexual partners.    Condoms are not typically used.  She would like to be screened for STD's at today's exam.  Current birth control method: Nexplanon.  She is happy with her current method of contraception and does not want to discuss alternative methods of contraception.  MENSTRUAL Hx:  No LMP recorded (lmp unknown). Patient has had an implant.  In the past 6 months her cycles have been irregular.  Her menstrual flow is typically normal.   Each month on average there are roughly 2 day(s) of very heavy flow.    Intermenstrual bleeding is present.  " "  Post-coital bleeding is absent.  Dysmenorrhea: is not affecting her activities of daily living  PMS: is not affecting her activities of daily living  Her cycles are not a source of concern for her that she wishes to discuss today.  HEALTH Hx:  She exercises regularly:yes.  She wears her seat belt:yes.  She has concerns about domestic violence: no.  OTHER COMPLAINTS:  Nothing else    The following portions of the patient's history were reviewed and updated as appropriate:problem list, current medications, allergies, past family history, past medical history, past social history and past surgical history.    Social History    Tobacco Use      Smoking status: Former Smoker        Years: 4.50        Types: Cigarettes        Quit date: 08/2018        Years since quitting: 3.1      Smokeless tobacco: Never Used      Tobacco comment: 1 PACK Q 3 DAYS    Review of Systems  Review of Systems - History obtained from the patient  General ROS: negative  Psychological ROS: negative  ENT ROS: positive for - nasal congestion  Allergy and Immunology ROS: positive for - seasonal allergies  Hematological and Lymphatic ROS: negative  Endocrine ROS: Goiter, benign  Breast ROS: negative for breast lumps  Respiratory ROS: no cough, shortness of breath, or wheezing  Cardiovascular ROS: no chest pain or dyspnea on exertion  Gastrointestinal ROS: no abdominal pain, change in bowel habits, or black or bloody stools  Genito-Urinary ROS: no dysuria, trouble voiding, or hematuria  Musculoskeletal ROS: negative  Neurological ROS: positive for -sleep apnea  Dermatological ROS: negative        Objective   /80 (BP Location: Right arm, Patient Position: Sitting, Cuff Size: Large Adult)   Resp 16   Ht 175.3 cm (69\")   Wt (!) 164 kg (361 lb 9.6 oz)   LMP  (LMP Unknown)   Breastfeeding No   BMI 53.40 kg/m²     General:  well developed; well nourished  no acute distress   Skin:  No suspicious lesions seen   Thyroid: not examined   Breasts:  " Examined in supine position  Symmetric without masses or skin dimpling  Nipples normal without inversion, lesions or discharge  There are no palpable axillary nodes   Abdomen: soft, non-tender; no masses  no umbilical or inguinal hernias are present  no hepato-splenomegaly   Heart: regular rate and rhythm, S1, S2 normal, no murmur, click, rub or gallop   Lungs: clear to auscultation   Pelvis: Clinical staff was present for exam  External genitalia:  normal appearance of the external genitalia including Bartholin's and [place]'s glands.  :  urethral meatus normal;  Vaginal:  normal pink mucosa without prolapse or lesions.  Cervix:  normal appearance. Pap smear was done  Uterus:  not palpable.  Adnexa:  non palpable bilaterally.  Rectal:  digital rectal exam not performed; anus visually normal appearing.          Assessment/Plan   Diagnoses and all orders for this visit:    1. Well woman exam with routine gynecological exam (Primary)  -     Pap IG, HPV-hr; Future    2. Birth control counseling    3. History of atrial fibrillation    4. Screening examination for STD (sexually transmitted disease)    5. History of lump in breast    6. Uterine leiomyoma, unspecified location  -     US Non-ob Transvaginal; Future         Return in 3 weeks for transvaginal ultrasound  Return in 3 weeks for Nexplanon removal and reinsertion    This note was electronically signed.    Abdulaziz Uriarte MD   September 20, 2021

## 2021-09-21 ENCOUNTER — OFFICE VISIT (OUTPATIENT)
Dept: CARDIOLOGY | Facility: CLINIC | Age: 41
End: 2021-09-21

## 2021-09-21 VITALS
BODY MASS INDEX: 43.4 KG/M2 | DIASTOLIC BLOOD PRESSURE: 78 MMHG | WEIGHT: 293 LBS | OXYGEN SATURATION: 98 % | HEART RATE: 85 BPM | SYSTOLIC BLOOD PRESSURE: 126 MMHG | HEIGHT: 69 IN

## 2021-09-21 DIAGNOSIS — D50.8 IRON DEFICIENCY ANEMIA SECONDARY TO INADEQUATE DIETARY IRON INTAKE: Chronic | ICD-10-CM

## 2021-09-21 DIAGNOSIS — Z01.810 PRE-OPERATIVE CARDIOVASCULAR EXAMINATION: ICD-10-CM

## 2021-09-21 DIAGNOSIS — I10 ESSENTIAL HYPERTENSION: Chronic | ICD-10-CM

## 2021-09-21 DIAGNOSIS — U07.1 COVID-19: ICD-10-CM

## 2021-09-21 DIAGNOSIS — R53.83 FATIGUE, UNSPECIFIED TYPE: ICD-10-CM

## 2021-09-21 DIAGNOSIS — G47.33 MODERATE OBSTRUCTIVE SLEEP APNEA: Chronic | ICD-10-CM

## 2021-09-21 DIAGNOSIS — E66.01 MORBID OBESITY WITH BMI OF 50.0-59.9, ADULT (HCC): Chronic | ICD-10-CM

## 2021-09-21 DIAGNOSIS — E11.9 TYPE 2 DIABETES MELLITUS WITHOUT COMPLICATION, WITHOUT LONG-TERM CURRENT USE OF INSULIN (HCC): Primary | Chronic | ICD-10-CM

## 2021-09-21 DIAGNOSIS — I48.0 PAROXYSMAL ATRIAL FIBRILLATION (HCC): Chronic | ICD-10-CM

## 2021-09-21 PROCEDURE — 99214 OFFICE O/P EST MOD 30 MIN: CPT | Performed by: INTERNAL MEDICINE

## 2021-09-21 RX ORDER — ASPIRIN 81 MG/1
81 TABLET ORAL DAILY
Qty: 90 TABLET | Refills: 3
Start: 2021-09-21 | End: 2022-01-11

## 2021-09-23 ENCOUNTER — TELEPHONE (OUTPATIENT)
Dept: OBSTETRICS AND GYNECOLOGY | Facility: CLINIC | Age: 41
End: 2021-09-23

## 2021-09-23 ENCOUNTER — TELEPHONE (OUTPATIENT)
Dept: MAMMOGRAPHY | Facility: HOSPITAL | Age: 41
End: 2021-09-23

## 2021-09-23 NOTE — TELEPHONE ENCOUNTER
Returned patient call; Left a message briefly reviewing the information I spoke with her about on 8.11.21 concerning the surgical consult appointment with Dr Kendall today. Dr Kendall's office number was left for patient to reschedule appointment as needed.

## 2021-09-23 NOTE — TELEPHONE ENCOUNTER
Patient was called and informed that her STD screen was negative.  Her Pap smear was negative but her HPV screen was positive for nonsixteen noneighteen.  Patient reports having abnormal Pap smears in the past but was several years ago.  She will repeat the Pap smear in 1 year.  She will return in October for Nexplanon and transvaginal ultrasound.    Abdulaziz Uriarte MD

## 2021-09-28 ENCOUNTER — OFFICE VISIT (OUTPATIENT)
Dept: INTERNAL MEDICINE | Facility: CLINIC | Age: 41
End: 2021-09-28

## 2021-09-28 VITALS
SYSTOLIC BLOOD PRESSURE: 134 MMHG | BODY MASS INDEX: 43.4 KG/M2 | WEIGHT: 293 LBS | DIASTOLIC BLOOD PRESSURE: 84 MMHG | HEIGHT: 69 IN | TEMPERATURE: 96.9 F | HEART RATE: 80 BPM | RESPIRATION RATE: 16 BRPM | OXYGEN SATURATION: 100 %

## 2021-09-28 DIAGNOSIS — Z91.09 ENVIRONMENTAL ALLERGIES: ICD-10-CM

## 2021-09-28 DIAGNOSIS — G56.03 BILATERAL CARPAL TUNNEL SYNDROME: ICD-10-CM

## 2021-09-28 DIAGNOSIS — M79.671 BILATERAL FOOT PAIN: ICD-10-CM

## 2021-09-28 DIAGNOSIS — M79.672 BILATERAL FOOT PAIN: ICD-10-CM

## 2021-09-28 DIAGNOSIS — I10 ESSENTIAL HYPERTENSION: ICD-10-CM

## 2021-09-28 DIAGNOSIS — I48.0 PAROXYSMAL ATRIAL FIBRILLATION (HCC): ICD-10-CM

## 2021-09-28 DIAGNOSIS — E11.9 TYPE 2 DIABETES MELLITUS WITHOUT COMPLICATION, WITHOUT LONG-TERM CURRENT USE OF INSULIN (HCC): Primary | ICD-10-CM

## 2021-09-28 LAB
EXPIRATION DATE: NORMAL
HBA1C MFR BLD: 6 %
Lab: NORMAL

## 2021-09-28 PROCEDURE — 3044F HG A1C LEVEL LT 7.0%: CPT | Performed by: NURSE PRACTITIONER

## 2021-09-28 PROCEDURE — 99214 OFFICE O/P EST MOD 30 MIN: CPT | Performed by: NURSE PRACTITIONER

## 2021-09-28 PROCEDURE — 83036 HEMOGLOBIN GLYCOSYLATED A1C: CPT | Performed by: NURSE PRACTITIONER

## 2021-09-28 RX ORDER — LEVOCETIRIZINE DIHYDROCHLORIDE 5 MG/1
5 TABLET, FILM COATED ORAL EVERY EVENING
Qty: 30 TABLET | Refills: 11 | Status: SHIPPED | OUTPATIENT
Start: 2021-09-28

## 2021-09-28 RX ORDER — NAPROXEN 500 MG/1
500 TABLET ORAL 2 TIMES DAILY PRN
Qty: 60 TABLET | Refills: 1 | Status: SHIPPED | OUTPATIENT
Start: 2021-09-28 | End: 2023-03-24 | Stop reason: SDUPTHER

## 2021-09-28 RX ORDER — OLOPATADINE HYDROCHLORIDE 1 MG/ML
1 SOLUTION/ DROPS OPHTHALMIC 2 TIMES DAILY
Qty: 5 ML | Refills: 11 | Status: SHIPPED | OUTPATIENT
Start: 2021-09-28

## 2021-09-28 NOTE — PROGRESS NOTES
Fuad Nielsen presents to [facility] for     Chief Complaint  Chief Complaint   Patient presents with   • Diabetes     routine 3 month recheck    • Hypertension   • Atrial Fibrillation   • Allergies     itchy eyes, runny nose, drainage, tried OTC claritin, sudafed, and floanse with no relief       Subjective        History of Present Illness    type 2 DIABETES.  Her last hemoglobin A1c was 5.9% on 6/29/2021  Medications: none  Patient does not check glucose at home.   Current diet: mix of healthy and unhealthy, but has cut back more in the last month on sugary drinks.   current exercise: joined a gym, working out routinely   She has lost 3 lbs since her last visit  Last diabetic eye exam : at  [facility]-I will have my office reach out to request a copy   Microalbumin  normal 6/2021  Ace: yes-lisinopril  Denies headaches, dizziness, visual disturbances, chest pain, dyspnea, polyuria, polyphagia, paraesthesias, and hypoglycemic episodes.      HTN- chronic, on lisinopril. well controlled   Denies headaches, dizziness, visual disturbances, palpitations chest pain, dyspnea, TIA or CVA symptoms, leg pain/claudication symptoms, and edema.     PAF-noted after Covid in 12/2020.  Felt to be more related to Covid.  She did see Dr. Harris and was anticoagulated for a brief period time but he felt as though that she no longer needed anticoagulation and felt as though aspirin and beta-blocker were sufficient.  She has done well with this.    Echo in 1/2020 looked okay. Saw Jameel last week, got a good report, will see again in 6 months.     Allergies: She complains of itchy eyes, runny nose, postnasal drip.  She has tried Claritin, Sudafed, and Flonase  Has a lot of sinus pressure and drainage.   She does not that she wheeze occasionally with some mild SOA with exertion.   She does have a bit of a productive cough.   She has covid tested X 2 and was negative   she did have  COVID in 12/20 and does not feel like it is the same.       ALINA. Is now on CPAP. She has been using  nasal prong instead of full mask.  Has not been wearing CPAP because of her allergies.   Due for FU with sleep med, she plans on calling.     Carpal tunnel and bilateral foot pain -  using naproxen rarely.  Has been out of it for a while and needs a refill on it.    Goiter- seeing endo . Thyroid labs looked ok  3/2021       mammogram required additional imaging.   Breast biopsy X2 was benign but  does go back next month to see Dr. PERRY for possible surgery for lymph node resection     She is currently following with bariatrics for possible bariatric surgery  She did have a gallbladder ultrasound 9/8/2021 through them which showed some gallstones and fatty liver but was otherwise unremarkable    Health Maintenance:     She will get flu vaccine with work.       Review of Systems  12 point ROS negative unless otherwise noted in HPI above    No Known Allergies  Current Outpatient Medications on File Prior to Visit   Medication Sig Dispense Refill   • amLODIPine (NORVASC) 10 MG tablet Take 1 tablet by mouth Daily. 30 tablet 6   • aspirin (aspirin) 81 MG EC tablet Take 1 tablet by mouth Daily. 90 tablet 3   • Blood Glucose Monitoring Suppl (FREESTYLE FREEDOM LITE) w/Device kit Use as directed to test blood sugar once daily 1 each 0   • Etonogestrel (NEXPLANON SC) Inject  under the skin into the appropriate area as directed.     • fluticasone (Flonase) 50 MCG/ACT nasal spray 2 sprays into the nostril(s) as directed by provider Daily. (Patient taking differently: 2 sprays into the nostril(s) as directed by provider Daily As Needed.) 16 g 11   • Lancets misc Use as directed to test blood sugar once Daily. 100 each 11   • lisinopril (PRINIVIL,ZESTRIL) 10 MG tablet Take 1 tablet by mouth Daily. 30 tablet 6   • metoprolol tartrate (LOPRESSOR) 25 MG tablet Take 0.5 tablets by mouth Every 12 (Twelve) Hours. 30 tablet 6   •  [DISCONTINUED] naproxen (NAPROSYN) 500 MG tablet Take 1 tablet by mouth 2 (Two) Times a Day As Needed for Mild Pain With food (Patient taking differently: Take 500 mg by mouth 2 (Two) Times a Day As Needed for Mild Pain . HAS NOT HAD RECENTLY) 60 tablet 3   • ciclopirox (Penlac) 8 % solution Apply  topically to the appropriate area as directed Every Night. (Patient taking differently: Apply 1 application topically to the appropriate area as directed At Night As Needed (TOE NAIL FUNGUS).) 6.6 mL 1     No current facility-administered medications on file prior to visit.         The following portions of the patient's history were reviewed and updated as appropriate: allergies, current medications, past family history, past medical history, past social history, past surgical history and problem list and are available for review within electronic record    Objective     Result Review :     The following data was reviewed by: FAMILIA Enriquez on 09/28/2021:  CMP    CMP 1/1/21 1/2/21 8/17/21   Glucose 92 96    BUN 6 5 (A)    Creatinine 0.83 0.75    eGFR African Am 93 104    Sodium 140 139    Potassium 4.3 3.6 4.2   Chloride 105 103    Calcium 9.4 8.9    Albumin 4.10     Total Bilirubin 0.3     Alkaline Phosphatase 88     AST (SGOT) 21     ALT (SGPT) 17     (A) Abnormal value       Comments are available for some flowsheets but are not being displayed.           CBC w/diff    CBC w/Diff 1/1/21 1/2/21 8/17/21   WBC 6.49 6.52 4.53   RBC 4.95 4.80 4.43   Hemoglobin 12.5 12.0 11.4 (A)   Hematocrit 41.2 39.3 38.0   MCV 83.2 81.9 85.8   MCH 25.3 (A) 25.0 (A) 25.7 (A)   MCHC 30.3 (A) 30.5 (A) 30.0 (A)   RDW 14.8 14.9 14.3   Platelets 289 280 250   Neutrophil Rel % 37.7 (A) 28.4 (A)    Immature Granulocyte Rel % 0.2 0.2    Lymphocyte Rel % 50.2 (A) 61.7 (A)    Monocyte Rel % 8.9 6.6    Eosinophil Rel % 2.5 2.5    Basophil Rel % 0.5 0.6    (A) Abnormal value                TSH    TSH 1/1/21 3/29/21   TSH 2.110 1.410        "    A1C Last 3 Results    HGBA1C Last 3 Results 3/29/21 6/29/21 9/28/21   Hemoglobin A1C 6.1 5.9 6.0           Microalbumin    Microalbumin 6/29/21   Microalbumin, Urine <1.2                      Vital Signs:   /84   Pulse 80   Temp 96.9 °F (36.1 °C) (Infrared)   Resp 16   Ht 175.3 cm (69.02\")   Wt (!) 162 kg (358 lb)   SpO2 100%   BMI 52.84 kg/m²         Physical Exam  Vitals and nursing note reviewed.   Constitutional:       General: She is not in acute distress.     Appearance: Normal appearance. She is well-developed. She is morbidly obese. She is not diaphoretic.   HENT:      Head: Normocephalic and atraumatic.      Right Ear: Ear canal and external ear normal. No laceration, drainage, swelling or tenderness. A middle ear effusion is present. There is no impacted cerumen. No mastoid tenderness. Tympanic membrane is not injected, scarred, perforated or bulging.      Left Ear: Hearing, ear canal and external ear normal. No laceration, drainage, swelling or tenderness. A middle ear effusion is present. There is no impacted cerumen. No mastoid tenderness. Tympanic membrane is not injected, scarred, perforated or bulging.      Nose: Mucosal edema and rhinorrhea present.      Right Turbinates: Not enlarged, swollen or pale.      Left Turbinates: Not enlarged, swollen or pale.      Right Sinus: No maxillary sinus tenderness or frontal sinus tenderness.      Left Sinus: No maxillary sinus tenderness or frontal sinus tenderness.   Eyes:      Conjunctiva/sclera: Conjunctivae normal.      Pupils: Pupils are equal, round, and reactive to light.   Neck:      Vascular: No JVD.   Cardiovascular:      Rate and Rhythm: Normal rate and regular rhythm.      Pulses:           Dorsalis pedis pulses are 2+ on the right side and 2+ on the left side.        Posterior tibial pulses are 2+ on the right side and 2+ on the left side.      Heart sounds: Normal heart sounds. No murmur heard.     Pulmonary:      Effort: " Pulmonary effort is normal. No respiratory distress.      Breath sounds: Normal breath sounds.   Chest:      Chest wall: No tenderness.   Abdominal:      General: Bowel sounds are normal. There is no distension.      Palpations: Abdomen is soft.      Tenderness: There is no abdominal tenderness.   Musculoskeletal:         General: Normal range of motion.      Cervical back: Normal range of motion.   Skin:     General: Skin is warm and dry.      Coloration: Skin is not pale.      Findings: No erythema or rash.   Neurological:      Mental Status: She is alert and oriented to person, place, and time.      Coordination: Coordination normal.   Psychiatric:         Behavior: Behavior normal.         Thought Content: Thought content normal.         Judgment: Judgment normal.                 Assessment and Plan      Diagnoses and all orders for this visit:    1. Type 2 diabetes mellitus without complication, without long-term current use of insulin (CMS/[facility]) (Primary)  -     POC Glycosylated Hemoglobin (Hb A1C)  Diabetes is well controlled.  Continue weight loss efforts.  No need for Metformin at this time.  ADA diet.  Increase physical activity  2. Essential hypertension  Hypertension is fairly well controlled.  Slightly elevated in office today but 3 other occasions in the last month within epic show normal blood pressure.  We will have her continue her lisinopril, amlodipine, and metoprolol.  3. Paroxysmal atrial fibrillation (HCC)  Asymptomatic.  Continue aspirin and beta-blocker.  Follow-up with cardiology in 6 months as planned.  4. Environmental allergies  -     olopatadine (Patanol) 0.1 % ophthalmic solution; Administer 1 drop to both eyes 2 (Two) Times a Day.  Dispense: 5 mL; Refill: 11  -     levocetirizine (Xyzal) 5 MG tablet; Take 1 tablet by mouth Every Evening.  Dispense: 30 tablet; Refill: 11  Uncontrolled.  We will add Patanol and change Claritin over to Xyzal.  Continue Flonase.  5. Bilateral carpal tunnel  syndrome  -     naproxen (NAPROSYN) 500 MG tablet; Take 1 tablet by mouth 2 (Two) Times a Day As Needed for Mild Pain .  Dispense: 60 tablet; Refill: 1    6. Bilateral foot pain  -     naproxen (NAPROSYN) 500 MG tablet; Take 1 tablet by mouth 2 (Two) Times a Day As Needed for Mild Pain .  Dispense: 60 tablet; Refill: 1  And 5/6-naproxen as needed.  Use sparingly.  Stop for GI side effects.          Follow Up     Patient was given instructions and counseling regarding her condition or for health maintenance advice. Please see specific information pulled into the AVS if appropriate.   Any new medication's adverse effects have been discussed in detail with patient  Patient was encouraged to keep me informed of any acute changes, lack of improvement, or any new concerning symptoms.    Return in about 3 months (around 12/28/2021) for chronic condition follow up.          * Please note that portions of this note were completed with a voice recognition program. Efforts were made to edit the dictation but occasionally words are erroneously transcribed.

## 2021-10-13 ENCOUNTER — OFFICE VISIT (OUTPATIENT)
Dept: OBSTETRICS AND GYNECOLOGY | Facility: CLINIC | Age: 41
End: 2021-10-13

## 2021-10-13 VITALS
RESPIRATION RATE: 16 BRPM | SYSTOLIC BLOOD PRESSURE: 124 MMHG | DIASTOLIC BLOOD PRESSURE: 88 MMHG | BODY MASS INDEX: 43.4 KG/M2 | HEIGHT: 69 IN | WEIGHT: 293 LBS

## 2021-10-13 DIAGNOSIS — Z30.46 ENCOUNTER FOR REMOVAL AND REINSERTION OF NEXPLANON: Primary | ICD-10-CM

## 2021-10-13 PROCEDURE — 11983 REMOVE/INSERT DRUG IMPLANT: CPT | Performed by: OBSTETRICS & GYNECOLOGY

## 2021-10-13 NOTE — PROGRESS NOTES
Nexplanon Removal and Reinsertion    No LMP recorded (lmp unknown). Patient has had an implant.    Date of procedure:  10/13/2021    Risks and benefits discussed? yes  All questions answered? yes  Consents given by the patient  Written consent obtained? yes    Local anesthesia used:  yes - 7 cc's of  Meds; anesthesia local: None, 1% lidocaine with epinephrine    Procedure documentation:    The upper left arm (non-dominant) was marked at the intended site of removal.  Betadine was used to cleanse the skin.  Local anesthesia was injected.  A vertical incision was created at the distal tip of the implant.  The implant was removed intact without difficulty.    The new Nexplanon was placed subdermally without difficulty through an incision below the one  used to remove the prior implant.  The devise was able to be palpated in the arm by both myself and Trinaye.  Steri-strips were then placed across both site of insertions and the arm was wrapped.    She tolerated the procedure well.  There were no complications.  EBL was minimal.    Post procedure instructions: Remove the wrapping in 24 hours and the steri-strips in 3 days.    Follow up needed: PRN    This note was electronically signed.    Abdulaziz Uriarte MD    October 13, 2021

## 2021-12-07 ENCOUNTER — TRANSCRIBE ORDERS (OUTPATIENT)
Dept: MAMMOGRAPHY | Facility: HOSPITAL | Age: 41
End: 2021-12-07

## 2021-12-07 DIAGNOSIS — R22.30 LUMP ON FINGER, UNSPECIFIED LATERALITY: Primary | ICD-10-CM

## 2021-12-14 ENCOUNTER — CONSULT (OUTPATIENT)
Dept: BARIATRICS/WEIGHT MGMT | Facility: CLINIC | Age: 41
End: 2021-12-14

## 2021-12-14 VITALS
RESPIRATION RATE: 18 BRPM | HEIGHT: 69 IN | BODY MASS INDEX: 43.4 KG/M2 | WEIGHT: 293 LBS | SYSTOLIC BLOOD PRESSURE: 128 MMHG | OXYGEN SATURATION: 99 % | TEMPERATURE: 97.4 F | DIASTOLIC BLOOD PRESSURE: 70 MMHG | HEART RATE: 111 BPM

## 2021-12-14 DIAGNOSIS — I48.0 PAROXYSMAL ATRIAL FIBRILLATION (HCC): ICD-10-CM

## 2021-12-14 DIAGNOSIS — Z86.19 HISTORY OF HELICOBACTER PYLORI INFECTION: ICD-10-CM

## 2021-12-14 DIAGNOSIS — G47.33 MODERATE OBSTRUCTIVE SLEEP APNEA: ICD-10-CM

## 2021-12-14 DIAGNOSIS — I10 ESSENTIAL HYPERTENSION: Primary | ICD-10-CM

## 2021-12-14 DIAGNOSIS — E11.9 TYPE 2 DIABETES MELLITUS WITHOUT COMPLICATION, WITHOUT LONG-TERM CURRENT USE OF INSULIN (HCC): ICD-10-CM

## 2021-12-14 PROCEDURE — 99214 OFFICE O/P EST MOD 30 MIN: CPT | Performed by: SURGERY

## 2021-12-15 ENCOUNTER — HOSPITAL ENCOUNTER (OUTPATIENT)
Dept: MAMMOGRAPHY | Facility: HOSPITAL | Age: 41
Discharge: HOME | End: 2021-12-15

## 2021-12-15 ENCOUNTER — HOSPITAL ENCOUNTER (OUTPATIENT)
Dept: ULTRASOUND IMAGING | Facility: HOSPITAL | Age: 41
Discharge: HOME | End: 2021-12-15

## 2021-12-15 ENCOUNTER — TRANSCRIBE ORDERS (OUTPATIENT)
Dept: MAMMOGRAPHY | Facility: HOSPITAL | Age: 41
End: 2021-12-15

## 2021-12-15 DIAGNOSIS — R22.30 LUMP ON FINGER, UNSPECIFIED LATERALITY: ICD-10-CM

## 2021-12-15 DIAGNOSIS — R92.8 ABNORMAL MAMMOGRAM: Primary | ICD-10-CM

## 2021-12-15 PROCEDURE — 77065 DX MAMMO INCL CAD UNI: CPT | Performed by: RADIOLOGY

## 2021-12-15 PROCEDURE — 76642 ULTRASOUND BREAST LIMITED: CPT | Performed by: RADIOLOGY

## 2021-12-15 PROCEDURE — 76642 ULTRASOUND BREAST LIMITED: CPT

## 2021-12-15 PROCEDURE — 77065 DX MAMMO INCL CAD UNI: CPT

## 2021-12-25 NOTE — PROGRESS NOTES
[facility]  [address]  [address]  [facility] [id number] [phone]      Patient  Name:  Fuad Nielsen  :  1980      Date of Visit: 21    Chief Complaint:  weight gain; unable to maintain weight loss.   Evaluate for possible revisional metabolic and bariatric surgery    History of Present Illness:  Fuad Nielsen is a 41 y.o. female who presents today for evaluation, education and consultation regarding revisional metabolic and bariatric surgery (MBS).  Since last seen 10/13/2021 she has maintained her weight.  Most recent evaluation Dr. Nuñez dated 2020 reviewed.  She noted the patient is status post laparoscopic robotic assisted sleeve gastrectomy by Dr. Goddard in 2012.  She noted the patient has infrequent reflux and occurs only with certain acidic foods and that she has significant seasonal allergies recently started on Flonase and that she had a recent thyroid and breast biopsies both of which were benign and then upper GI series at [facility] showed changes of sleeve gastrectomy otherwise unremarkable.     The patient has had issues with morbid obesity for years and only temporary success with surgical and non-surgical methods of weight loss.  The patient is seeking revisional metabolic and bariatric surgery to help with the morbid obesity related conditions of iron deficiency anemia, history of atrial fibrillation associated with Covid infection 2020, type II non-insulin-dependent diabetes mellitus, dyspnea exertion, fatigue, uterine leiomyoma, multinodular thyroid goiter, history of H. pylori gastritis treated, hypertension, obstructive sleep apnea, vitamin D deficiency.    41-year-old super morbidly obese female known to me as above.  Her presleeve weight was 386 pounds and the lowest weight she achieved was 280 pounds.  She is currently 354-1/2 pounds with a BMI of 52.4.  She confirms only  "occasional mild reflux and no dysphagia or abdominal pain.  EGD dated 8/19/2021 showed unremarkable changes status post sleeve gastrectomy with mild dilatation of the proximal sleeve and some carditis without evidence of hiatal hernia Z-line 39 cm.  Pathology of the antrum showed minimal reactive change negative for H. pylori and distal esophageal biopsies showed histologic findings that were minimal and felt to be nonspecific.  Ultrasound dated 9/8/2021 showed gallstones and fatty liver otherwise unremarkable.  Upper GI [facility] dated 6/14/2021 unremarkable status post sleeve gastrectomy, images reviewed.    She says she is not avoiding high fructose corn syrup.  She says it is just easier to eat poorly.  She feels like the sleeve \"works for me\".  She said she got under 300 pounds for the first time in 10 years.  Her tachycardia in the office today is asymptomatic.  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  She takes Naprosyn for pain in her feet.  She denies any gallbladder symptoms despite her positive ultrasound.            Past Medical History:   Diagnosis Date   • Abnormal Pap smear of cervix    • Anemia    • Atrial fibrillation (HCC)     after COVID, did not require cardioversion - on BBlocker + ASA - follows w/ Dr. Harris   • COVID-19     12/2020   • COVID-19 vaccine series completed     both doses   • Diabetes (HCC) 2009    dx in 2009, never on insulin, now diet controlled, most recent A1C 6.1   • Dyspepsia    • Dyspnea on exertion    • Fatigue    • Fibroids    • Goiter     She says followed by her physician with serial ultrasounds   • History of Helicobacter pylori infection     treated remotely   • History of transfusion     NO REACTION TO BLOOD   • Hypertension    • Iron deficiency    • Morbid obesity with BMI of 50.0-59.9, adult (HCC)    • Sleep apnea     CPAP COMPLIANT   • Thyroid disorder     multinodular - following w/ Endocrinology   • Tobacco use disorder, mild, in " sustained remission     quit    • Vitamin D deficiency    • Wears glasses      Past Surgical History:   Procedure Laterality Date   • BREAST BIOPSY Right 2021    lymph node FNA   • BREAST BIOPSY Right 2021    stereo bx x2   •  SECTION  2018   • COLONOSCOPY  2016   • COLPOSCOPY W/ BIOPSY / CURETTAGE     • ENDOSCOPY     • ENDOSCOPY N/A 2021    Procedure: ESOPHAGOGASTRODUODENOSCOPY WITH BIOPSY;  Surgeon: Anant Goddard MD;  Location: [facility] ENDOSCOPY;  Service: General;  Laterality: N/A;   • GASTRIC SLEEVE LAPAROSCOPIC  10/2012    [facility]   • OOPHORECTOMY Right 2004    benign cysts   • US GUIDED FINE NEEDLE ASPIRATION  2021    RIGHT   • WISDOM TOOTH EXTRACTION         No Known Allergies    Current Outpatient Medications:   •  amLODIPine (NORVASC) 10 MG tablet, Take 1 tablet by mouth Daily., Disp: 30 tablet, Rfl: 6  •  Etonogestrel (NEXPLANON SC), Inject  under the skin into the appropriate area as directed., Disp: , Rfl:   •  fluticasone (Flonase) 50 MCG/ACT nasal spray, 2 sprays into the nostril(s) as directed by provider Daily. (Patient taking differently: 2 sprays into the nostril(s) as directed by provider Daily As Needed.), Disp: 16 g, Rfl: 11  •  lisinopril (PRINIVIL,ZESTRIL) 10 MG tablet, Take 1 tablet by mouth Daily., Disp: 30 tablet, Rfl: 6  •  metoprolol tartrate (LOPRESSOR) 25 MG tablet, Take 0.5 tablets by mouth Every 12 (Twelve) Hours., Disp: 30 tablet, Rfl: 6  •  naproxen (NAPROSYN) 500 MG tablet, Take 1 tablet by mouth 2 (Two) Times a Day As Needed for Mild Pain ., Disp: 60 tablet, Rfl: 1  •  aspirin (aspirin) 81 MG EC tablet, Take 1 tablet by mouth Daily., Disp: 90 tablet, Rfl: 3  •  Blood Glucose Monitoring Suppl (FREESTYLE FREEDOM LITE) w/Device kit, Use as directed to test blood sugar once daily, Disp: 1 each, Rfl: 0  •  Lancets misc, Use as directed to test blood sugar once Daily., Disp: 100 each, Rfl: 11  •  levocetirizine (Xyzal) 5 MG tablet,  Take 1 tablet by mouth Every Evening., Disp: 30 tablet, Rfl: 11  •  olopatadine (Patanol) 0.1 % ophthalmic solution, Administer 1 drop to both eyes 2 (Two) Times a Day., Disp: 5 mL, Rfl: 11    Social History     Socioeconomic History   • Marital status: Single   Tobacco Use   • Smoking status: Former Smoker     Packs/day: 0.00     Years: 4.50     Pack years: 0.00     Types: Cigarettes     Quit date: 08/2018     Years since quitting: 3.4   • Smokeless tobacco: Never Used   • Tobacco comment: 1 PACK Q 3 DAYS   Vaping Use   • Vaping Use: Never used   Substance and Sexual Activity   • Alcohol use: Yes     Alcohol/week: 2.0 standard drinks     Types: 1 Glasses of wine, 1 Cans of beer per week     Comment: BEER OR WINE   • Drug use: Never   • Sexual activity: Yes     Partners: Male     Birth control/protection: Nexplanon     Family History   Problem Relation Age of Onset   • Obesity Mother    • Hypertension Mother    • Other Father 39        car accident   • Hypertension Father    • Diabetes Sister    • No Known Problems Maternal Grandfather    • Hypertension Paternal Grandmother    • Heart disease Paternal Grandfather    • Stroke Paternal Grandfather    • Heart attack Paternal Grandfather    • Aneurysm Maternal Grandmother         Brain   • Ovarian cancer Neg Hx    • Breast cancer Neg Hx        Review of Systems   Constitutional: Positive for fatigue and unexpected weight gain. Negative for chills, diaphoresis, fever and unexpected weight loss.   HENT: Negative for congestion and facial swelling.    Eyes: Negative for blurred vision, double vision and discharge.   Respiratory: Negative for chest tightness, shortness of breath and stridor.    Cardiovascular: Negative for chest pain, palpitations and leg swelling.   Gastrointestinal: Negative for blood in stool.   Endocrine: Negative for polydipsia.   Genitourinary: Negative for hematuria.   Skin: Negative for color change.   Allergic/Immunologic: Negative for  immunocompromised state.   Neurological: Negative for confusion.   Psychiatric/Behavioral: Negative for self-injury.       I have reviewed the ROS and confirm that it's accurate today.    Physical Exam:  Vital Signs:  Weight: (!) 161 kg (354 lb 8 oz)   Body mass index is 52.35 kg/m².  Temp: 97.4 °F (36.3 °C)   Heart Rate: 111   BP: 128/70     Physical Exam  Vitals reviewed.   Constitutional:       Appearance: She is well-developed.   HENT:      Head: Normocephalic and atraumatic.      Nose:      Comments: mask  Eyes:      Conjunctiva/sclera: Conjunctivae normal.      Pupils: Pupils are equal, round, and reactive to light.   Neck:      Thyroid: No thyromegaly.      Vascular: No carotid bruit.      Trachea: No tracheal deviation.   Cardiovascular:      Rate and Rhythm: Regular rhythm. Tachycardia present.      Heart sounds: Murmur heard.    Systolic murmur is present with a grade of 3/6.       Comments: Murmur R > L 2nd ICS.  Sinus tachy on exam  Pulmonary:      Effort: Pulmonary effort is normal. No respiratory distress.      Breath sounds: Normal breath sounds.   Abdominal:      General: There is no distension.      Palpations: Abdomen is soft.      Tenderness: There is no abdominal tenderness.          Comments: Laparoscopy scars and supraumbilical vertical  scar (pt says because of fibroids)   Musculoskeletal:         General: No deformity. Normal range of motion.      Cervical back: Normal range of motion and neck supple.   Skin:     General: Skin is warm and dry.      Findings: No rash.   Neurological:      Mental Status: She is alert and oriented to person, place, and time.      Cranial Nerves: No cranial nerve deficit.      Coordination: Coordination normal.   Psychiatric:         Behavior: Behavior normal.         Thought Content: Thought content normal.         Judgment: Judgment normal.         Patient Active Problem List   Diagnosis   • Type 2 diabetes mellitus without complication, without  long-term current use of insulin (HCC)   • Iron deficiency anemia secondary to inadequate dietary iron intake   • Uterine leiomyoma   • Bilateral carpal tunnel syndrome   • B12 deficiency   • Morbid obesity with BMI of 50.0-59.9, adult (HCC)   • A-fib (HCC)   • Essential hypertension   • COVID-19   • Moderate obstructive sleep apnea (Severe in REM sleep)   • Thyromegaly   • Fatigue   • Dyspepsia   • History of Helicobacter pylori infection   • Vitamin D deficiency   • Thyroid disorder   • Abnormal mammogram   • BV (bacterial vaginosis)       Assessment:    Fuad Nielsen is a 41 y.o. year old female with medically complicated obesity status post uneventful laparoscopic robotic assisted sleeve gastrectomy October 2012..    Revision metabolic and bariatric surgery is deemed medically necessary given the following obesity related comorbidities including iron deficiency anemia, history of atrial fibrillation associated with Covid infection December 2020, type II non-insulin-dependent diabetes mellitus, dyspnea exertion, fatigue, uterine leiomyoma, multinodular thyroid goiter, history of H. pylori gastritis treated, hypertension, obstructive sleep apnea, vitamin D deficiency with current Weight: (!) 161 kg (354 lb 8 oz) and Body mass index is 52.35 kg/m²..    We had a long discussion regarding the risk, benefits, and alternative therapies to revisional metabolic and bariatric surgical options.  I used flip charts and Internet diagrams.  I went over potential short and long-term complications and the need for lifelong vitamin, mineral supplementation and laboratory follow-up and the possibility that further surgery may be warranted for potential complications, etc.  We discussed revision to a Namita-en-Y gastric bypass, a modified duodenal switch, and a formal duodenal switch.  We also discussed that sleeve revision alone may not lead to any significant weight loss.  I strongly encouraged the patient to seek second  opinion(s) and to consider referral to medical weight loss.  She says she is not interested in Namita-en-Y gastric bypass regardless.  She also says she does not want any operation that could cause diarrhea.  Mild tobacco disuse disorder in early remission.      Plan: After discussion of the risks, benefits, alternative therapies as above she tentatively would like to proceed with laparoscopic possible robotic assisted modified duodenal switch/sips, cholecystectomy with intraoperative cholangiogram, and EGD.  She will go through the usual process and attend the informed consent class and I will see her back in the office for final visit prior to scheduling surgery.    Thank you FAMILIA Tan for the opportunity to reevaluate Ms. Jonas.        Anant Goddard MD

## 2022-01-11 ENCOUNTER — LAB (OUTPATIENT)
Dept: LAB | Facility: HOSPITAL | Age: 42
End: 2022-01-11

## 2022-01-11 ENCOUNTER — OFFICE VISIT (OUTPATIENT)
Dept: INTERNAL MEDICINE | Facility: CLINIC | Age: 42
End: 2022-01-11

## 2022-01-11 VITALS
OXYGEN SATURATION: 98 % | WEIGHT: 293 LBS | DIASTOLIC BLOOD PRESSURE: 84 MMHG | HEART RATE: 84 BPM | RESPIRATION RATE: 18 BRPM | HEIGHT: 69 IN | SYSTOLIC BLOOD PRESSURE: 128 MMHG | TEMPERATURE: 98.6 F | BODY MASS INDEX: 43.4 KG/M2

## 2022-01-11 DIAGNOSIS — D64.9 MILD ANEMIA: ICD-10-CM

## 2022-01-11 DIAGNOSIS — E66.01 MORBID OBESITY WITH BMI OF 50.0-59.9, ADULT: ICD-10-CM

## 2022-01-11 DIAGNOSIS — E04.2 MULTINODULAR GOITER (NONTOXIC): ICD-10-CM

## 2022-01-11 DIAGNOSIS — I48.0 PAROXYSMAL ATRIAL FIBRILLATION: ICD-10-CM

## 2022-01-11 DIAGNOSIS — I10 ESSENTIAL HYPERTENSION: ICD-10-CM

## 2022-01-11 DIAGNOSIS — E11.9 TYPE 2 DIABETES MELLITUS WITHOUT COMPLICATION, WITHOUT LONG-TERM CURRENT USE OF INSULIN: ICD-10-CM

## 2022-01-11 DIAGNOSIS — E11.9 TYPE 2 DIABETES MELLITUS WITHOUT COMPLICATION, WITHOUT LONG-TERM CURRENT USE OF INSULIN: Primary | ICD-10-CM

## 2022-01-11 LAB
ALBUMIN SERPL-MCNC: 4.4 G/DL (ref 3.5–5.2)
ALBUMIN/GLOB SERPL: 1.4 G/DL
ALP SERPL-CCNC: 78 U/L (ref 39–117)
ALT SERPL W P-5'-P-CCNC: 12 U/L (ref 1–33)
ANION GAP SERPL CALCULATED.3IONS-SCNC: 8.8 MMOL/L (ref 5–15)
AST SERPL-CCNC: 17 U/L (ref 1–32)
BILIRUB SERPL-MCNC: 0.4 MG/DL (ref 0–1.2)
BUN SERPL-MCNC: 8 MG/DL (ref 6–20)
BUN/CREAT SERPL: 9.4 (ref 7–25)
CALCIUM SPEC-SCNC: 9.4 MG/DL (ref 8.6–10.5)
CHLORIDE SERPL-SCNC: 106 MMOL/L (ref 98–107)
CHOLEST SERPL-MCNC: 137 MG/DL (ref 0–200)
CO2 SERPL-SCNC: 26.2 MMOL/L (ref 22–29)
CREAT SERPL-MCNC: 0.85 MG/DL (ref 0.57–1)
DEPRECATED RDW RBC AUTO: 46.4 FL (ref 37–54)
ERYTHROCYTE [DISTWIDTH] IN BLOOD BY AUTOMATED COUNT: 15.5 % (ref 12.3–15.4)
EXPIRATION DATE: NORMAL
FERRITIN SERPL-MCNC: 14.4 NG/ML (ref 13–150)
GFR SERPL CREATININE-BSD FRML MDRD: 90 ML/MIN/1.73
GLOBULIN UR ELPH-MCNC: 3.2 GM/DL
GLUCOSE SERPL-MCNC: 79 MG/DL (ref 65–99)
HBA1C MFR BLD: 6.1 %
HCT VFR BLD AUTO: 38.7 % (ref 34–46.6)
HDLC SERPL-MCNC: 45 MG/DL (ref 40–60)
HGB BLD-MCNC: 12.1 G/DL (ref 12–15.9)
LDLC SERPL CALC-MCNC: 73 MG/DL (ref 0–100)
LDLC/HDLC SERPL: 1.59 {RATIO}
Lab: NORMAL
MCH RBC QN AUTO: 25.6 PG (ref 26.6–33)
MCHC RBC AUTO-ENTMCNC: 31.3 G/DL (ref 31.5–35.7)
MCV RBC AUTO: 82 FL (ref 79–97)
PLATELET # BLD AUTO: 299 10*3/MM3 (ref 140–450)
PMV BLD AUTO: 11.7 FL (ref 6–12)
POTASSIUM SERPL-SCNC: 4 MMOL/L (ref 3.5–5.2)
PROT SERPL-MCNC: 7.6 G/DL (ref 6–8.5)
RBC # BLD AUTO: 4.72 10*6/MM3 (ref 3.77–5.28)
SODIUM SERPL-SCNC: 141 MMOL/L (ref 136–145)
TRIGL SERPL-MCNC: 103 MG/DL (ref 0–150)
TSH SERPL DL<=0.05 MIU/L-ACNC: 1.46 UIU/ML (ref 0.27–4.2)
VLDLC SERPL-MCNC: 19 MG/DL (ref 5–40)
WBC NRBC COR # BLD: 6.42 10*3/MM3 (ref 3.4–10.8)

## 2022-01-11 PROCEDURE — 80050 GENERAL HEALTH PANEL: CPT | Performed by: NURSE PRACTITIONER

## 2022-01-11 PROCEDURE — 83036 HEMOGLOBIN GLYCOSYLATED A1C: CPT | Performed by: NURSE PRACTITIONER

## 2022-01-11 PROCEDURE — 82728 ASSAY OF FERRITIN: CPT | Performed by: NURSE PRACTITIONER

## 2022-01-11 PROCEDURE — 80061 LIPID PANEL: CPT | Performed by: NURSE PRACTITIONER

## 2022-01-11 PROCEDURE — 99214 OFFICE O/P EST MOD 30 MIN: CPT | Performed by: NURSE PRACTITIONER

## 2022-01-11 RX ORDER — LISINOPRIL 10 MG/1
10 TABLET ORAL DAILY
Qty: 90 TABLET | Refills: 1 | Status: SHIPPED | OUTPATIENT
Start: 2022-01-11 | End: 2022-09-12 | Stop reason: SDUPTHER

## 2022-01-11 RX ORDER — AMLODIPINE BESYLATE 10 MG/1
10 TABLET ORAL DAILY
Qty: 90 TABLET | Refills: 1 | Status: SHIPPED | OUTPATIENT
Start: 2022-01-11 | End: 2022-09-12 | Stop reason: SDUPTHER

## 2022-01-11 NOTE — PROGRESS NOTES
Fuad Nielsen presents to [facility] for     Chief Complaint  Chief Complaint   Patient presents with   • Diabetes     3 month recheck (Last A1C 9/28- 6.0)    • Hypertension     3 month recheck   • Labs Only     CMP & Lipid due from June 2021         Subjective        History of Present Illness    Type 2 DIABETES.  Her last hemoglobin A1c was 6.0% 9/2021  Medications: none  Patient does not check glucose at home.   Current diet: mix of healthy and unhealthy, low-carb high-protein  current exercise: joined a gym, struggles with working out routinely  Last diabetic eye exam : at  [facility]-6/4/2021  Microalbumin  normal 6/2021  Ace: yes-lisinopril  Denies headaches, dizziness, visual disturbances, chest pain, dyspnea, polyuria, polyphagia, paraesthesias, and hypoglycemic episodes.      HTN- chronic, on lisinopril. well controlled.  Denies headaches, dizziness, visual disturbances, palpitations chest pain, dyspnea, TIA or CVA symptoms, leg pain/claudication symptoms, and edema.      PAF-noted after Covid in 12/2020.  Felt to be more related to Covid.  She did see Dr. Harris and was anticoagulated for a brief period time but he felt as though that she no longer needed anticoagulation and felt as though aspirin and beta-blocker were sufficient.  She has done well with this.  She will follow-up with Dr. Harris in August for reevaluation.  Rarely has palpitations.  Echo in 1/2020 looked okay.        ALINA. Is now on CPAP. She has been using  nasal prong instead of full mask.  Follows with sleep medicine    Multinodular nontoxic goiter- seeing endo . Thyroid labs looked ok  3/2021 she last saw Dr. Aden in 7/2021.  She had a FNA which was benign.  She will see them again 6/20/2021 with a repeat ultrasound          mammogram required additional imaging.   Breast biopsy X2 was benign she did go back to see Dr. PERRY who was going to do a possible lymph node resection.   However, she tells me that he wanted to repeat an ultrasound first.  Area was unchanged at that time so she will repeat her mammogram in 6 months and at that time may need resection with Dr. PERRY.       does go back next month to see Dr. PERRY for possible surgery for lymph node resection      She is currently following with bariatrics for possible repeat/revised bariatric surgery  She did have a gallbladder ultrasound 9/8/2021 through them which showed some gallstones and fatty liver but was otherwise unremarkable  She saw Dr. Goddard who rec SIPS procedure. She is not sure that she wants to do this   wants to do her 6 months of weight loss visits here, see where she is out, and then may be proceed with surgery  Current diet: high protein, low carb  Exercise: not a lot, struggles with trying to get enough physical activity, going to start 8 week program with her gym soon  Works as a nurse at [employer]      Review of Systems   Constitutional: Negative for fatigue, fever and unexpected weight loss.   Eyes: Negative for blurred vision, double vision, pain and visual disturbance.   Respiratory: Negative for cough, chest tightness, shortness of breath and wheezing.    Cardiovascular: Negative for chest pain, palpitations and leg swelling.   Gastrointestinal: Negative for abdominal pain, constipation, diarrhea, nausea and vomiting.   Genitourinary: Negative for difficulty urinating, frequency and urgency.   Musculoskeletal: Negative for arthralgias and myalgias.   Skin: Negative for color change and rash.   Neurological: Negative for dizziness, weakness, light-headedness, headache and confusion.   Hematological: Negative for adenopathy. Does not bruise/bleed easily.         No Known Allergies  Current Outpatient Medications on File Prior to Visit   Medication Sig Dispense Refill   • Blood Glucose Monitoring Suppl (FREESTYLE FREEDOM LITE) w/Device kit Use as directed to test blood sugar once daily 1 each 0   • Etonogestrel  (NEXPLANON SC) Inject  under the skin into the appropriate area as directed.     • fluticasone (Flonase) 50 MCG/ACT nasal spray 2 sprays into the nostril(s) as directed by provider Daily. (Patient taking differently: 2 sprays into the nostril(s) as directed by provider Daily As Needed.) 16 g 11   • Lancets misc Use as directed to test blood sugar once Daily. 100 each 11   • levocetirizine (Xyzal) 5 MG tablet Take 1 tablet by mouth Every Evening. 30 tablet 11   • naproxen (NAPROSYN) 500 MG tablet Take 1 tablet by mouth 2 (Two) Times a Day As Needed for Mild Pain . 60 tablet 1   • olopatadine (Patanol) 0.1 % ophthalmic solution Administer 1 drop to both eyes 2 (Two) Times a Day. 5 mL 11   • [DISCONTINUED] amLODIPine (NORVASC) 10 MG tablet Take 1 tablet by mouth Daily. 30 tablet 6   • [DISCONTINUED] lisinopril (PRINIVIL,ZESTRIL) 10 MG tablet Take 1 tablet by mouth Daily. 30 tablet 6   • [DISCONTINUED] metoprolol tartrate (LOPRESSOR) 25 MG tablet Take 0.5 tablets by mouth Every 12 (Twelve) Hours. (Patient taking differently: Take 25 mg by mouth Daily.) 30 tablet 6   • [DISCONTINUED] aspirin (aspirin) 81 MG EC tablet Take 1 tablet by mouth Daily. 90 tablet 3     No current facility-administered medications on file prior to visit.         The following portions of the patient's history were reviewed and updated as appropriate: allergies, current medications, past family history, past medical history, past social history, past surgical history and problem list and are available for review within electronic record    Objective     Result Review :     The following data was reviewed by: FAMILIA Enriquez on 01/11/2022:  CMP    CMP 8/17/21   Potassium 4.2           CBC w/diff    CBC w/Diff 8/17/21   WBC 4.53   RBC 4.43   Hemoglobin 11.4 (A)   Hematocrit 38.0   MCV 85.8   MCH 25.7 (A)   MCHC 30.0 (A)   RDW 14.3   Platelets 250   (A) Abnormal value                TSH    TSH 3/29/21   TSH 1.410           A1C Last 3 Results   "  HGBA1C Last 3 Results 6/29/21 9/28/21 1/11/22   Hemoglobin A1C 5.9 6.0 6.1           Microalbumin    Microalbumin 6/29/21   Microalbumin, Urine <1.2                      Vital Signs:   /84   Pulse 84   Temp 98.6 °F (37 °C)   Resp 18   Ht 175.3 cm (69\")   Wt (!) 159 kg (350 lb 3.2 oz)   SpO2 98%   BMI 51.72 kg/m²         Physical Exam  Vitals and nursing note reviewed.   Constitutional:       General: She is not in acute distress.     Appearance: Normal appearance. She is well-developed. She is morbidly obese. She is not ill-appearing, toxic-appearing or diaphoretic.   HENT:      Head: Normocephalic and atraumatic.   Eyes:      Conjunctiva/sclera: Conjunctivae normal.      Pupils: Pupils are equal, round, and reactive to light.   Neck:      Thyroid: Thyromegaly present. No thyroid mass or thyroid tenderness.      Vascular: Normal carotid pulses. No carotid bruit or JVD.   Cardiovascular:      Rate and Rhythm: Normal rate and regular rhythm.      Heart sounds: Normal heart sounds. No murmur heard.      Pulmonary:      Effort: Pulmonary effort is normal. No respiratory distress.      Breath sounds: Normal breath sounds.   Chest:      Chest wall: No tenderness.   Abdominal:      General: Bowel sounds are normal. There is no distension.      Palpations: Abdomen is soft.      Tenderness: There is no abdominal tenderness.   Musculoskeletal:         General: Normal range of motion.      Cervical back: Normal range of motion.   Skin:     General: Skin is warm and dry.      Coloration: Skin is not pale.      Findings: No erythema or rash.   Neurological:      Mental Status: She is alert and oriented to person, place, and time.      Coordination: Coordination normal.   Psychiatric:         Behavior: Behavior normal.         Thought Content: Thought content normal.         Judgment: Judgment normal.                 Assessment and Plan      Diagnoses and all orders for this visit:    1. Type 2 diabetes mellitus " without complication, without long-term current use of insulin ([facility]) (Primary)  -     POC Glycosylated Hemoglobin (Hb A1C)  -     lisinopril (PRINIVIL,ZESTRIL) 10 MG tablet; Take 1 tablet by mouth Daily.  Dispense: 90 tablet; Refill: 1  -     Comprehensive Metabolic Panel; Future  -     Lipid Panel; Future  Stable.  We will hold off on medications for now.  Continue ADA diet and increase physical activity.    2. Essential hypertension  -     lisinopril (PRINIVIL,ZESTRIL) 10 MG tablet; Take 1 tablet by mouth Daily.  Dispense: 90 tablet; Refill: 1  -     metoprolol tartrate (LOPRESSOR) 25 MG tablet; Take 0.5 tablets by mouth Every 12 (Twelve) Hours.  Dispense: 90 tablet; Refill: 1  -     Comprehensive Metabolic Panel; Future  -     Lipid Panel; Future  -     amLODIPine (NORVASC) 10 MG tablet; Take 1 tablet by mouth Daily.  Dispense: 90 tablet; Refill: 1  Well-controlled.  Continue lisinopril, metoprolol, and amlodipine.  Low-sodium diet  3. Paroxysmal atrial fibrillation (HCC)  -     metoprolol tartrate (LOPRESSOR) 25 MG tablet; Take 0.5 tablets by mouth Every 12 (Twelve) Hours.  Dispense: 90 tablet; Refill: 1  -     Comprehensive Metabolic Panel; Future  Regular rate and rhythm noted on exam today.  Continue metoprolol.  Follow-up with cardiology later this year as planned.  4. Morbid obesity with BMI of 50.0-59.9, adult ([facility])  Patient's (Body mass index is 51.72 kg/m².) indicates that they are morbidly obese (BMI > 40 or > 35 with obesity - related health condition) with health related conditions that include obstructive sleep apnea, hypertension, diabetes mellitus and dyslipidemias . Weight is unchanged. BMI is is above average; BMI management plan is completed. We discussed portion control, increasing exercise, consulting a Bariatric surgeon and High-protein, low-carb diet, increase physical activity.   She will complete monthly weight loss visits with me for the next 6 months and continue to follow with  bariatrics  5. Mild anemia  -     CBC (No Diff); Future  -     Ferritin; Future  Recheck CBC.  6. Multinodular goiter (nontoxic)  -     TSH Rfx On Abnormal To Free T4; Future  Recheck thyroid labs.  Follow-up with Endo as planned for repeat ultrasound later this year      Follow Up     Patient was given instructions and counseling regarding her condition or for health maintenance advice. Please see specific information pulled into the AVS if appropriate.   Any new medication's adverse effects have been discussed in detail with patient  Patient was encouraged to keep me informed of any acute changes, lack of improvement, or any new concerning symptoms.    Return in about 1 month for weight check (around 2/11/2022) for and need to collect labs today.          Dictated Utilizing Dragon Dictation   Please note that portions of this note were completed with a voice recognition program.   Part of this note may be an electronic transcription/translation of spoken language to printed text using the Dragon Dictation System.

## 2022-01-17 DIAGNOSIS — R79.0 LOW FERRITIN: Primary | ICD-10-CM

## 2022-01-17 RX ORDER — DOXYCYCLINE HYCLATE 50 MG/1
324 CAPSULE, GELATIN COATED ORAL
Qty: 90 TABLET | Refills: 0 | Status: SHIPPED | OUTPATIENT
Start: 2022-01-17 | End: 2022-04-17

## 2022-02-01 ENCOUNTER — LAB (OUTPATIENT)
Dept: LAB | Facility: HOSPITAL | Age: 42
End: 2022-02-01

## 2022-02-01 DIAGNOSIS — Z11.52 ENCOUNTER FOR SCREENING FOR COVID-19: Primary | ICD-10-CM

## 2022-02-01 LAB — SARS-COV-2 RNA NOSE QL NAA+PROBE: NOT DETECTED

## 2022-02-01 PROCEDURE — U0004 COV-19 TEST NON-CDC HGH THRU: HCPCS

## 2022-02-01 PROCEDURE — C9803 HOPD COVID-19 SPEC COLLECT: HCPCS

## 2022-03-16 ENCOUNTER — HOSPITAL ENCOUNTER (OUTPATIENT)
Dept: ULTRASOUND IMAGING | Facility: HOSPITAL | Age: 42
Discharge: HOME | End: 2022-03-16

## 2022-03-16 ENCOUNTER — TRANSCRIBE ORDERS (OUTPATIENT)
Dept: MAMMOGRAPHY | Facility: HOSPITAL | Age: 42
End: 2022-03-16

## 2022-03-16 ENCOUNTER — HOSPITAL ENCOUNTER (OUTPATIENT)
Dept: MAMMOGRAPHY | Facility: HOSPITAL | Age: 42
Discharge: HOME | End: 2022-03-16

## 2022-03-16 DIAGNOSIS — R92.8 ABNORMAL MAMMOGRAM: ICD-10-CM

## 2022-03-16 DIAGNOSIS — R92.8 ABNORMAL MAMMOGRAM: Primary | ICD-10-CM

## 2022-03-16 PROCEDURE — 76642 ULTRASOUND BREAST LIMITED: CPT | Performed by: RADIOLOGY

## 2022-03-16 PROCEDURE — 76642 ULTRASOUND BREAST LIMITED: CPT

## 2022-03-16 PROCEDURE — 77065 DX MAMMO INCL CAD UNI: CPT | Performed by: RADIOLOGY

## 2022-03-16 PROCEDURE — 77065 DX MAMMO INCL CAD UNI: CPT

## 2022-04-27 NOTE — PROGRESS NOTES
OFFICE VISIT  NOTE  [facility] CARDIOLOGY      Name: Fuad Nielsen    Date: 2022  MRN:  [id number]  :  1980      REFERRING/PRIMARY PROVIDER:  Mandie Tobar APRN     Chief Complaint   Patient presents with   • PAF       HPI: Fuad Nielsen is a 42 y.o. female who presents today for follow-up for atrial fibrillation.  History of morbid obesity, BMI 53, hypertension, COVID-19 infection 2020, new diagnosis of PAF 2021.  Normal Holter 2021.  Eliquis discontinued and low-dose aspirin started.  She is doing well on metoprolol, rare palpitations.    Denies chest pain or shortness of breath with exertion.  She is exercising 2 to 3 days/week without symptoms.  Taking care of her 3-year-old, stays active, no palpitations recently.    ROS:Pertinent positives as listed in the HPI.  All other systems reviewed and negative.    Past Medical History:   Diagnosis Date   • Abnormal Pap smear of cervix    • Anemia    • Atrial fibrillation (HCC)     after COVID, did not require cardioversion - on BBlocker + ASA - follows w/ Dr. Harris   • COVID-19     2020   • COVID-19 vaccine series completed     both doses   • Diabetes (HCC) 2009    dx in , never on insulin, now diet controlled, most recent A1C 6.1   • Dyspepsia    • Dyspnea on exertion    • Fatigue    • Fibroids    • Goiter     She says followed by her physician with serial ultrasounds   • History of Helicobacter pylori infection     treated remotely   • History of transfusion     NO REACTION TO BLOOD   • Hypertension    • Iron deficiency    • Morbid obesity with BMI of 50.0-59.9, adult (HCC)    • Sleep apnea     CPAP COMPLIANT   • Thyroid disorder     multinodular - following w/ Endocrinology   • Tobacco use disorder, mild, in sustained remission     quit    • Vitamin D deficiency    • Wears glasses        Past Surgical History:   Procedure Laterality Date   • BREAST BIOPSY Right 2021    lymph node FNA   •  BREAST BIOPSY Right 2021    stereo bx x2   •  SECTION  2018   • COLONOSCOPY     • COLPOSCOPY W/ BIOPSY / CURETTAGE     • ENDOSCOPY     • ENDOSCOPY N/A 2021    Procedure: ESOPHAGOGASTRODUODENOSCOPY WITH BIOPSY;  Surgeon: Anant Goddard MD;  Location: [facility] ENDOSCOPY;  Service: General;  Laterality: N/A;   • GASTRIC SLEEVE LAPAROSCOPIC  10/2012    [facility]   • OOPHORECTOMY Right 2004    benign cysts   • US GUIDED FINE NEEDLE ASPIRATION  2021    RIGHT   • WISDOM TOOTH EXTRACTION         Social History     Socioeconomic History   • Marital status: Single   Tobacco Use   • Smoking status: Former Smoker     Packs/day: 0.00     Years: 4.50     Pack years: 0.00     Types: Cigarettes     Quit date: 2018     Years since quitting: 3.7   • Smokeless tobacco: Never Used   • Tobacco comment: 1 PACK Q 3 DAYS   Vaping Use   • Vaping Use: Never used   Substance and Sexual Activity   • Alcohol use: Yes     Alcohol/week: 2.0 standard drinks     Types: 1 Glasses of wine, 1 Cans of beer per week     Comment: BEER OR WINE   • Drug use: Never   • Sexual activity: Yes     Partners: Male     Birth control/protection: Nexplanon       Family History   Problem Relation Age of Onset   • Obesity Mother    • Hypertension Mother    • Other Father 39        car accident   • Hypertension Father    • Diabetes Sister    • No Known Problems Maternal Grandfather    • Hypertension Paternal Grandmother    • Heart disease Paternal Grandfather    • Stroke Paternal Grandfather    • Heart attack Paternal Grandfather    • Aneurysm Maternal Grandmother         Brain   • Ovarian cancer Neg Hx    • Breast cancer Neg Hx         No Known Allergies    Current Outpatient Medications   Medication Instructions   • amLODIPine (NORVASC) 10 mg, Oral, Daily   • Blood Glucose Monitoring Suppl (FREESTYLE FREEDOM LITE) w/Device kit Use as directed to test blood sugar once daily   • Etonogestrel (NEXPLANON SC) Subcutaneous   •  "fluticasone (Flonase) 50 MCG/ACT nasal spray 2 sprays, Nasal, Daily   • Lancets misc Use as directed to test blood sugar once Daily.   • levocetirizine (XYZAL) 5 mg, Oral, Every Evening   • lisinopril (PRINIVIL,ZESTRIL) 10 mg, Oral, Daily   • metoprolol tartrate (LOPRESSOR) 12.5 mg, Oral, Every 12 Hours Scheduled   • naproxen (NAPROSYN) 500 mg, Oral, 2 Times Daily PRN   • olopatadine (Patanol) 0.1 % ophthalmic solution 1 drop, Both Eyes, 2 Times Daily       Vitals:    04/28/22 1022   BP: 120/68   BP Location: Right arm   Patient Position: Sitting   Pulse: 70   SpO2: 99%   Weight: (!) 163 kg (359 lb)   Height: 175.3 cm (69.02\")     Body mass index is 52.99 kg/m².    PHYSICAL EXAM:    General Appearance:   · well developed  · well nourished  Neck:  · thyroid not enlarged  · supple  Respiratory:  · no respiratory distress  · normal breath sounds  · no rales  Cardiovascular:  · no jugular venous distention  · regular rhythm  · apical impulse normal  · S1 normal, S2 normal  · no S3, no S4   · no murmur  · no rub, no thrill  · carotid pulses normal; no bruit  · pedal pulses normal  · lower extremity edema: none    Skin:   warm, dry    RESULTS:   Procedures    Results for orders placed during the hospital encounter of 01/01/21    Adult Transthoracic Echo Complete W/ Cont if Necessary Per Protocol    Interpretation Summary  · Left ventricular ejection fraction appears to be 61 - 65%. Left ventricular systolic function is normal.  · Left ventricular diastolic function was normal.  · No significant structural or functional valvular disease.        Labs:  Lab Results   Component Value Date    CHOL 137 01/11/2022    TRIG 103 01/11/2022    HDL 45 01/11/2022    LDL 73 01/11/2022    AST 17 01/11/2022    ALT 12 01/11/2022     Lab Results   Component Value Date    HGBA1C 6.1 01/11/2022     Creatinine   Date Value Ref Range Status   01/11/2022 0.85 0.57 - 1.00 mg/dL Final   01/02/2021 0.75 0.57 - 1.00 mg/dL Final   01/01/2021 0.83 " 0.57 - 1.00 mg/dL Final     No results found for: EGFRIFNONA      ASSESSMENT:  Problem List Items Addressed This Visit        Cardiac and Vasculature    A-fib (HCC) - Primary (Chronic)    Overview     1/2/21 Echo: LVEF 61-65%, structurally normal           Relevant Medications    metoprolol tartrate (LOPRESSOR) 25 MG tablet    Essential hypertension (Chronic)    Relevant Medications    metoprolol tartrate (LOPRESSOR) 25 MG tablet       Endocrine and Metabolic    Morbid obesity with BMI of 50.0-59.9, adult (HCC) (Chronic)       Sleep    Moderate obstructive sleep apnea (Severe in REM sleep) (Chronic)          PLAN:    1.  PAF:  Holter monitor normal 1/2020  [id number] score is 1 for hypertension  Continue aspirin 81 mg daily  Echocardiogram 1/2021 benign.  Metoprolol refilled today.     2.  Hypertension:  Long-term goal blood pressure is 130/80  Continue amlodipine, lisinopril, metoprolol.  Low-sodium diet and exercise recommended.     3. Morbid obesity  Weight loss encouraged  Decrease caloric intake, increase exercise      Follow-up   Return in about 1 year (around 4/28/2023).    Allen Harris MD, [facility]  Interventional Cardiology

## 2022-04-28 ENCOUNTER — OFFICE VISIT (OUTPATIENT)
Dept: CARDIOLOGY | Facility: CLINIC | Age: 42
End: 2022-04-28

## 2022-04-28 VITALS
OXYGEN SATURATION: 99 % | SYSTOLIC BLOOD PRESSURE: 120 MMHG | BODY MASS INDEX: 43.4 KG/M2 | WEIGHT: 293 LBS | DIASTOLIC BLOOD PRESSURE: 68 MMHG | HEIGHT: 69 IN | HEART RATE: 70 BPM

## 2022-04-28 DIAGNOSIS — G47.33 MODERATE OBSTRUCTIVE SLEEP APNEA: Chronic | ICD-10-CM

## 2022-04-28 DIAGNOSIS — I10 ESSENTIAL HYPERTENSION: Chronic | ICD-10-CM

## 2022-04-28 DIAGNOSIS — E66.01 MORBID OBESITY WITH BMI OF 50.0-59.9, ADULT: Chronic | ICD-10-CM

## 2022-04-28 DIAGNOSIS — I48.0 PAROXYSMAL ATRIAL FIBRILLATION: Primary | Chronic | ICD-10-CM

## 2022-04-28 PROCEDURE — 99214 OFFICE O/P EST MOD 30 MIN: CPT | Performed by: INTERNAL MEDICINE

## 2022-07-19 ENCOUNTER — TELEMEDICINE (OUTPATIENT)
Dept: FAMILY MEDICINE CLINIC | Facility: TELEHEALTH | Age: 42
End: 2022-07-19

## 2022-07-19 ENCOUNTER — LAB (OUTPATIENT)
Dept: LAB | Facility: HOSPITAL | Age: 42
End: 2022-07-19

## 2022-07-19 DIAGNOSIS — J06.9 UPPER RESPIRATORY TRACT INFECTION, UNSPECIFIED TYPE: Primary | ICD-10-CM

## 2022-07-19 DIAGNOSIS — J06.9 UPPER RESPIRATORY TRACT INFECTION, UNSPECIFIED TYPE: ICD-10-CM

## 2022-07-19 LAB — SARS-COV-2 RNA NOSE QL NAA+PROBE: DETECTED

## 2022-07-19 PROCEDURE — BHEMPVIDEOVISIT: Performed by: NURSE PRACTITIONER

## 2022-07-19 PROCEDURE — U0004 COV-19 TEST NON-CDC HGH THRU: HCPCS

## 2022-07-19 NOTE — PATIENT INSTRUCTIONS
10 Things You Can Do to Manage Your COVID-19 Symptoms at Home  If you have possible or confirmed COVID-19:  Stay home except to get medical care.  Monitor your symptoms carefully. If your symptoms get worse, call your healthcare provider immediately.  Get rest and stay hydrated.  If you have a medical appointment, call the healthcare provider ahead of time and tell them that you have or may have COVID-19.  For medical emergencies, call 911 and notify the dispatch personnel that you have or may have COVID-19.  Cover your cough and sneezes with a tissue or use the inside of your elbow.  Wash your hands often with soap and water for at least 20 seconds or clean your hands with an alcohol-based hand  that contains at least 60% alcohol.  As much as possible, stay in a specific room and away from other people in your home. Also, you should use a separate bathroom, if available. If you need to be around other people in or outside of the home, wear a mask.  Avoid sharing personal items with other people in your household, like dishes, towels, and bedding.  Clean all surfaces that are touched often, like counters, tabletops, and doorknobs. Use household cleaning sprays or wipes according to the label instructions.  cdc.gov/coronavirus  07/16/2021  This information is not intended to replace advice given to you by your health care provider. Make sure you discuss any questions you have with your health care provider.  Document Revised: 11/01/2021 Document Reviewed: 11/01/2021  [employer] Patient Education © 2021 [employer]  How to Quarantine at Home  Information for Patients and Families    These instructions are for people with confirmed or suspected COVID-19 who do not need to be hospitalized and those with confirmed COVID-19 who were hospitalized and discharged to care for themselves at home.    If you were tested through the Health Department  The Health Department will monitor your wellbeing.  If it is  determined that you do not need to be hospitalized and can be isolated at home, you will be monitored by staff from your local or state health department.     If you were tested through a Commercial Lab  You will need to monitor yourself and report changes in your symptoms to your doctor.  See the section below called Monitor Your Symptoms.    Follow these steps until a healthcare provider or local or state health department says you can return to your normal activities.    Stay home except to get medical care  Restrict activities outside your home, except for getting medical care.   Do not go to work, school, or public areas.   Avoid using public transportation, ride-sharing, or taxis.    Separate yourself from other people and animals in your home  People  As much as possible, you should stay in a specific room and away from other people in your home. Also, you should use a separate bathroom, if available.    Animals  You should restrict contact with pets and other animals while you are sick with COVID-19, just like you would around other people. When possible, have another member of your household care for your animals while you are sick. If you are sick with COVID-19, avoid contact with your pet, including petting, snuggling, being kissed or licked, and sharing food. If you must care for your pet or be around animals while you are sick, wash your hands before and after you interact with pets and wear a facemask. See COVID-19 and Animals for more information.    Call ahead before visiting your doctor  If you have a medical appointment, call the healthcare provider and tell them that you have or may have COVID-19. This information will help the healthcare provider’s office take steps to keep other people from getting infected or exposed.    Wear a facemask  You should wear a facemask when you are around other people (e.g., sharing a room or vehicle) or pets and before you enter a healthcare provider’s office.      If you are not able to wear a facemask (for example, because it causes trouble breathing), then people who live with you should not stay in the same room with you, or they should wear a facemask if they enter your room.    Cover your coughs and sneezes  Cover your mouth and nose with a tissue when you cough or sneeze.   Throw used tissues in a lined trash can.   Immediately wash your hands with soap and water for at least 20 seconds or, if soap and water are not available, clean your hands with an alcohol-based hand  that contains at least 60% alcohol.    Clean your hands often  Wash your hands often with soap and water for at least 20 seconds, especially after blowing your nose, coughing, or sneezing; going to the bathroom; and before eating or preparing food.     If soap and water are not readily available, use an alcohol-based hand  with at least 60% alcohol, covering all surfaces of your hands and rubbing them together until they feel dry.    Soap and water are the best option if hands are visibly dirty. Avoid touching your eyes, nose, and mouth with unwashed hands.    Avoid sharing personal household items  You should not share dishes, drinking glasses, cups, eating utensils, towels, or bedding with other people or pets in your home.   After using these items, they should be washed thoroughly with soap and water.    Clean all “high-touch” surfaces everyday  High touch surfaces include counters, tabletops, doorknobs, bathroom fixtures, toilets, phones, keyboards, tablets, and bedside tables.   Also, clean any surfaces that may have blood, stool, or body fluids on them.   Use a household cleaning spray or wipe, according to the label instructions. Labels contain instructions for safe and effective use of the cleaning product, including precautions you should take when applying the product, such as wearing gloves and making sure you have good ventilation during use of the product.    Monitor  your symptoms  Seek prompt medical attention if your illness is worsening (e.g., difficulty breathing).   Before seeking care, call your healthcare provider and tell them that you have, or are being evaluated for, COVID-19.   Put on a facemask before you enter the facility.     These steps will help the healthcare provider’s office to keep other people in the office or waiting room from getting infected or exposed.   Persons who are placed under active monitoring or facilitated self-monitoring should follow instructions provided by their local health department or occupational health professionals, as appropriate.  If you have a medical emergency and need to call 911, notify the dispatch personnel that you have, or are being evaluated for COVID-19. If possible, put on a facemask before emergency medical services arrive.    Discontinuing home isolation  Patients with confirmed COVID-19 should remain under home isolation precautions until the risk of secondary transmission to others is thought to be low. The decision to discontinue home isolation precautions should be made on a case-by-case basis, in consultation with healthcare providers and state and local health departments.    The below content are for household members, intimate partners, and caregivers of a patient with symptomatic laboratory-confirmed COVID-19 or a patient under investigation:    Household members, intimate partners, and caregivers may have close contact with a person with symptomatic, laboratory-confirmed COVID-19 or a person under investigation.     Close contacts should monitor their health; they should call their healthcare provider right away if they develop symptoms suggestive of COVID-19 (e.g., fever, cough, shortness of breath)     Close contacts should also follow these recommendations:  Make sure that you understand and can help the patient follow their healthcare provider’s instructions for medication(s) and care. You should help the  patient with basic needs in the home and provide support for getting groceries, prescriptions, and other personal needs.  Monitor the patient’s symptoms. If the patient is getting sicker, call his or her healthcare provider and tell them that the patient has laboratory-confirmed COVID-19. This will help the healthcare provider’s office take steps to keep other people in the office or waiting room from getting infected. Ask the healthcare provider to call the local or [facility] health department for additional guidance. If the patient has a medical emergency and you need to call 911, notify the dispatch personnel that the patient has, or is being evaluated for COVID-19.  Household members should stay in another room or be  from the patient as much as possible. Household members should use a separate bedroom and bathroom, if available.  Prohibit visitors who do not have an essential need to be in the home.  Household members should care for any pets in the home. Do not handle pets or other animals while sick.  For more information, see COVID-19 and Animals.  Make sure that shared spaces in the home have good air flow, such as by an air conditioner or an opened window, weather permitting.  Perform hand hygiene frequently. Wash your hands often with soap and water for at least 20 seconds or use an alcohol-based hand  that contains 60 to 95% alcohol, covering all surfaces of your hands and rubbing them together until they feel dry. Soap and water should be used preferentially if hands are visibly dirty.  Avoid touching your eyes, nose, and mouth with unwashed hands.  The patient should wear a facemask when you are around other people. If the patient is not able to wear a facemask (for example, because it causes trouble breathing), you, as the caregiver, should wear a mask when you are in the same room as the patient.  Wear a disposable facemask and gloves when you touch or have contact with the patient’s  blood, stool, or body fluids, such as saliva, sputum, nasal mucus, vomit, or urine.   Throw out disposable facemasks and gloves after using them. Do not reuse.  When removing personal protective equipment, first remove and dispose of gloves. Then, immediately clean your hands with soap and water or alcohol-based hand . Next, remove and dispose of facemask, and immediately clean your hands again with soap and water or alcohol-based hand .  Avoid sharing household items with the patient. You should not share dishes, drinking glasses, cups, eating utensils, towels, bedding, or other items. After the patient uses these items, you should wash them thoroughly (see below “Wash laundry thoroughly”).  Clean all “high-touch” surfaces, such as counters, tabletops, doorknobs, bathroom fixtures, toilets, phones, keyboards, tablets, and bedside tables, every day. Also, clean any surfaces that may have blood, stool, or body fluids on them.   Use a household cleaning spray or wipe, according to the label instructions. Labels contain instructions for safe and effective use of the cleaning product including precautions you should take when applying the product, such as wearing gloves and making sure you have good ventilation during use of the product.  Wash laundry thoroughly.   Immediately remove and wash clothes or bedding that have blood, stool, or body fluids on them.  Wear disposable gloves while handling soiled items and keep soiled items away from your body. Clean your hands (with soap and water or an alcohol-based hand ) immediately after removing your gloves.  Read and follow directions on labels of laundry or clothing items and detergent. In general, using a normal laundry detergent according to washing machine instructions and dry thoroughly using the warmest temperatures recommended on the clothing label.  Place all used disposable gloves, facemasks, and other contaminated items in a lined  container before disposing of them with other household waste. Clean your hands (with soap and water or an alcohol-based hand ) immediately after handling these items. Soap and water should be used preferentially if hands are visibly dirty.  Discuss any additional questions with your state or local health department or healthcare provider.    Adapted from information provided by the Centers for Disease Control and Prevention.  For more information, visit https://www.cdc.gov/coronavirus/2019-ncov/hcp/guidance-prevent-spread.html

## 2022-07-19 NOTE — PROGRESS NOTES
You have chosen to receive care through a telehealth visit.  Do you consent to use a video/audio connection for your medical care today? Yes     CHIEF COMPLAINT  No chief complaint on file.        HPI  Fuad Nielsen is a 42 y.o. female  presents with complaint of 3 day history of cough.  This morning began having runny nose, cough, sweats this morning.  Cough is worse at night.  Took at home COVID test that initially was negative, but then turned positive after the allotted amount of time.  Would like to have PCR test to confirm/rule out infection.  She denies fever, chest tightness,shortness of breath, body aches, chills.      employee    Review of Systems   Constitutional: Positive for diaphoresis. Negative for fever.   HENT: Positive for rhinorrhea.    Respiratory: Positive for cough. Negative for chest tightness, shortness of breath and wheezing.    Musculoskeletal: Negative for myalgias.   Neurological: Negative for headaches.   All other systems reviewed and are negative.      Past Medical History:   Diagnosis Date   • Abnormal Pap smear of cervix    • Anemia    • Atrial fibrillation (HCC)     after COVID, did not require cardioversion - on BBlocker + ASA - follows w/ Dr. Harris   • COVID-19     12/2020   • COVID-19 vaccine series completed     both doses   • Diabetes (HCC) 2009    dx in 2009, never on insulin, now diet controlled, most recent A1C 6.1   • Dyspepsia    • Dyspnea on exertion    • Fatigue    • Fibroids    • Goiter     She says followed by her physician with serial ultrasounds   • History of Helicobacter pylori infection     treated remotely   • History of transfusion     NO REACTION TO BLOOD   • Hypertension    • Iron deficiency    • Morbid obesity with BMI of 50.0-59.9, adult (HCC)    • Sleep apnea     CPAP COMPLIANT   • Thyroid disorder     multinodular - following w/ Endocrinology   • Tobacco use disorder, mild, in sustained remission     quit 2018   • Vitamin D deficiency    •  Wears glasses        Family History   Problem Relation Age of Onset   • Obesity Mother    • Hypertension Mother    • Other Father 39        car accident   • Hypertension Father    • Diabetes Sister    • No Known Problems Maternal Grandfather    • Hypertension Paternal Grandmother    • Heart disease Paternal Grandfather    • Stroke Paternal Grandfather    • Heart attack Paternal Grandfather    • Aneurysm Maternal Grandmother         Brain   • Ovarian cancer Neg Hx    • Breast cancer Neg Hx        Social History     Socioeconomic History   • Marital status: Single   Tobacco Use   • Smoking status: Former Smoker     Packs/day: 0.00     Years: 4.50     Pack years: 0.00     Types: Cigarettes     Quit date: 08/2018     Years since quitting: 3.9   • Smokeless tobacco: Never Used   • Tobacco comment: 1 PACK Q 3 DAYS   Vaping Use   • Vaping Use: Never used   Substance and Sexual Activity   • Alcohol use: Yes     Alcohol/week: 2.0 standard drinks     Types: 1 Glasses of wine, 1 Cans of beer per week     Comment: BEER OR WINE   • Drug use: Never   • Sexual activity: Yes     Partners: Male     Birth control/protection: Nexplanon       Fuad Zarina Nielsen  reports that she quit smoking about 3 years ago. Her smoking use included cigarettes. She smoked 0.00 packs per day for 4.50 years. She has never used smokeless tobacco.              There were no vitals taken for this visit.    PHYSICAL EXAM  Physical Exam   Constitutional: She is oriented to person, place, and time. She appears well-developed and well-nourished. She does not have a sickly appearance. She does not appear ill.   HENT:   Head: Normocephalic and atraumatic.   Pulmonary/Chest: Effort normal.  No respiratory distress.  Neurological: She is alert and oriented to person, place, and time.         Diagnoses and all orders for this visit:    1. Upper respiratory tract infection, unspecified type (Primary)  -     COVID-19 PCR, [facility], NP SWAB IN [facility]  TRANSPORT MEDIA/ORAL SWISH 24-30 HR TAT - Swab, Nasopharynx; Future    --COVID-19 test to rule out infection  --quarantine and symptomatic treatment advised      FOLLOW-UP  As discussed during visit with PCP/[facility] if no improvement or Urgent Care/Emergency Department if worsening of symptoms    Patient verbalizes understanding of medication dosage, comfort measures, instructions for treatment and follow-up.    Louisa Virk, APRN  07/19/2022  12:45 EDT    The use of a video visit has been reviewed with the patient and verbal informed consent has been obtained. Myself and Fuad Nielsen participated in this visit. The patient is located in [address] [place] [address].    I am located in [place], [place]. Mychart and Zoom were utilized. I spent 10 minutes in the patient's chart for this visit.

## 2022-08-25 ENCOUNTER — OFFICE VISIT (OUTPATIENT)
Dept: ENDOCRINOLOGY | Facility: CLINIC | Age: 42
End: 2022-08-25

## 2022-08-25 VITALS
SYSTOLIC BLOOD PRESSURE: 118 MMHG | HEART RATE: 81 BPM | BODY MASS INDEX: 43.4 KG/M2 | WEIGHT: 293 LBS | DIASTOLIC BLOOD PRESSURE: 68 MMHG | OXYGEN SATURATION: 98 % | HEIGHT: 69 IN

## 2022-08-25 DIAGNOSIS — E04.2 NONTOXIC MULTINODULAR GOITER: Primary | ICD-10-CM

## 2022-08-25 PROCEDURE — 99213 OFFICE O/P EST LOW 20 MIN: CPT | Performed by: INTERNAL MEDICINE

## 2022-08-25 PROCEDURE — 76536 US EXAM OF HEAD AND NECK: CPT | Performed by: INTERNAL MEDICINE

## 2022-08-25 NOTE — PROGRESS NOTES
"Chief Complaint   Patient presents with   • Goiter        HPI   Fuad Nielsen is a 42 y.o. female had concerns including Goiter.      Here for yearly thyroid ultrasound.    FNA last year was benign.  She denies any compressive symptoms.  Feels like the goiter is stable/may be slightly decreased in size.    The following portions of the patient's history were reviewed and updated as appropriate: allergies, current medications, past family history, past medical history, past social history, past surgical history and problem list.    Review of Systems   Constitutional: Negative.    HENT: Positive for postnasal drip.    Endocrine: Negative.    Allergic/Immunologic: Positive for environmental allergies.        /68   Pulse 81   Ht 175.3 cm (69\")   Wt (!) 163 kg (360 lb)   SpO2 98%   BMI 53.16 kg/m²      Physical Exam  Vitals reviewed.   Constitutional:       Appearance: Normal appearance. She is obese.      Comments: Body mass index is 53.16 kg/m².   Cardiovascular:      Rate and Rhythm: Normal rate.   Pulmonary:      Effort: Pulmonary effort is normal.   Neurological:      General: No focal deficit present.      Mental Status: She is alert. Mental status is at baseline.   Psychiatric:         Mood and Affect: Mood normal.         Behavior: Behavior normal.              LABS AND IMAGING   CMP  Lab Results   Component Value Date    GLUCOSE 79 01/11/2022    BUN 8 01/11/2022    CREATININE 0.85 01/11/2022    EGFRIFAFRI 90 01/11/2022    BCR 9.4 01/11/2022    K 4.0 01/11/2022    CO2 26.2 01/11/2022    CALCIUM 9.4 01/11/2022    ALBUMIN 4.40 01/11/2022    AST 17 01/11/2022    ALT 12 01/11/2022        CBC w/DIFF   Lab Results   Component Value Date    WBC 6.42 01/11/2022    RBC 4.72 01/11/2022    HGB 12.1 01/11/2022    HCT 38.7 01/11/2022    MCV 82.0 01/11/2022    MCH 25.6 (L) 01/11/2022    MCHC 31.3 (L) 01/11/2022    RDW 15.5 (H) 01/11/2022    RDWSD 46.4 01/11/2022    MPV 11.7 01/11/2022     01/11/2022 "    NEUTRORELPCT 28.4 (L) 01/02/2021    LYMPHORELPCT 61.7 (H) 01/02/2021    MONORELPCT 6.6 01/02/2021    EOSRELPCT 2.5 01/02/2021    BASORELPCT 0.6 01/02/2021    AUTOIGPER 0.2 01/02/2021    NEUTROABS 1.86 01/02/2021    LYMPHSABS 4.02 (H) 01/02/2021    MONOSABS 0.43 01/02/2021    EOSABS 0.16 01/02/2021    BASOSABS 0.04 01/02/2021    AUTOIGNUM 0.01 01/02/2021    NRBC 0.0 01/02/2021     Hemoglobin A1C   Date Value Ref Range Status   01/11/2022 6.1 % Final       TSH  Lab Results   Component Value Date    TSH 1.460 01/11/2022    TSH 1.410 03/29/2021    TSH 2.110 01/01/2021     T4  Lab Results   Component Value Date    FREET4 1.14 03/29/2021     TPO  Lab Results   Component Value Date    THYROIDAB 9 03/29/2021     Narrative & Impression   EXAMINATION: US THYROID - 04/20/2021     INDICATION:  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  Enlargement of the thyroid.      TECHNIQUE: Sonographic imaging is obtained of the thyroid in both the  sagittal and transverse planes.     COMPARISON: None.     FINDINGS: The thyroid is enlarged and asymmetric with the left lobe  being larger than the right. The isthmus is enlarged at 8 mm and is  heterogeneous. The right lobe thyroid measures 5.4 x 2.4 x 2.4 cm. The  echotexture is heterogeneous in appearance with a nodule inferiorly  measuring largest dimension 3.1 cm. There is a dominant nodule both  solid and cystic in appearance within the left lobe thyroid near  completely filling the left lobe the thyroid. The left lobe itself  measures 7.3 x 3.7 x 4.2 cm. The nodule measures 6.7 x 3.4 x 4.2 cm.     IMPRESSION:  Findings suggesting a multinodular goiter with the largest  nodule identified near completely filling the left lobe the thyroid  measuring 6.7 cm in its largest dimension. Fine needle aspiration can be  performed if clinically indicated.     DICTATED:   04/21/2021  EDITED/ls :   04/21/2021         This report was finalized on 4/23/2021 4:31 PM by Dr. Benítez  MD Navid.            Thyroid Biopsy Procedure Note 7/15/21        Indications: Thyroid Nodule     Anesthesia: Ethyl chloride spray     Procedure Details   The procedure, risks and complications were discussed in detail with the patient (including but not limited to infection, bleeding), and the patient signed consent for the procedure.     The neck was prepped in sterile fashion.  Biopsy site: left 6.7 cm thyroid nodule  With ultrasound guidance of needle localization,   4  aspirates were obtained with sterile 27 g. needles.  8 slides were prepared with both air drying and immediate cytology fixative.    A single container with 20 ml of cytology fixative was also used to collect needle and syringe washings.   Specimens were sent to pathology.   An additional specimen was collected for molecular analysis and will be sent in the event of indeterminate biopsy.      The patient tolerated the procedure without difficulty or complications.          Thyroid Ultrasound 08/25/22    Indication: Multinodular goiter  Comparison Imaging: Ultrasound 4/23/2021  Clinical History:  Large left nodule, history of FNA left nodule July 2021, was benign    Real time high resolution imaging of the thyroid gland was performed in transverse and longitudinal planes. Previous imaging reports were reviewed if available and compared to the current to assess stability.     Lobes:  The right lobe measured 4.5 cm L x 1.8 cm AP x 2.3 cm in TV dimension.    The isthmus measured 0.7 cm in thickness.    The left thyroid lobe measured 6.4 cm L x 3.7 cm AP x 4.6 cm in TV dimension.    Thyroid gland is heterogeneous and contains multiple nodules.    Nodule 1   located in the right lower lobe and measures 2.9 x 2.0 x 1.8 cm (L X AP X TV).  This nodule is mixed solid and cystic, heterogeneous, hypoechoic with well-defined margins, and Grade I vascularity on Color Flow Doppler.  It has no artifacts.  Previously measured 3.0 x 1.9 x 1.8 cm.    Nodule  2  is located in the left mid lobe and measures 6.5 x 3.2 x 4.3 cm (L X AP X TV).  This nodule is mixed solid and cystic, heterogeneous, hypoechoic with well-defined margins, and Grade I vascularity on Color Flow Doppler.  It has no artifacts.  This nodule is similar in size to appearance to last year when it was biopsied and found to be benign.    No pathologic lymph nodes were seen.    Impression:  Stable multinodular goiter.  Large dominant left lobe nodule measuring 6.5 cm, unchanged since last year when it was biopsied and found to be benign.  Stable right lobe nodule versus pseudonodule.    Recommendation:  Repeat ultrasound 1 year        Assessment and Plan    Diagnoses and all orders for this visit:    1. Nontoxic multinodular goiter (Primary)  Normal thyroid function.    See detailed thyroid ultrasound as above.  Stable multinodular goiter.  Large dominant left lobe nodule measuring 6.5 cm, unchanged since last year when it was biopsied and found to be benign.  Stable right lobe nodule versus pseudonodule.  No repeat FNA needed.  Repeat ultrasound in 1 year.  -     US Thyroid         Return in about 1 year (around 8/25/2023) for next scheduled follow up, with thyroid ultrasound ** 30 min appt. The patient was instructed to contact the clinic with any interval questions or concerns.    Jessica Aden, DO   Endocrinologist    Please note that portions of this note were completed with a voice recognition program.

## 2022-09-12 ENCOUNTER — OFFICE VISIT (OUTPATIENT)
Dept: INTERNAL MEDICINE | Facility: CLINIC | Age: 42
End: 2022-09-12

## 2022-09-12 ENCOUNTER — TELEPHONE (OUTPATIENT)
Dept: INTERNAL MEDICINE | Facility: CLINIC | Age: 42
End: 2022-09-12

## 2022-09-12 VITALS
OXYGEN SATURATION: 98 % | TEMPERATURE: 98.4 F | WEIGHT: 293 LBS | DIASTOLIC BLOOD PRESSURE: 86 MMHG | HEIGHT: 69 IN | BODY MASS INDEX: 43.4 KG/M2 | SYSTOLIC BLOOD PRESSURE: 138 MMHG | HEART RATE: 96 BPM | RESPIRATION RATE: 18 BRPM

## 2022-09-12 DIAGNOSIS — I48.0 PAROXYSMAL ATRIAL FIBRILLATION: ICD-10-CM

## 2022-09-12 DIAGNOSIS — E66.01 MORBID OBESITY WITH BMI OF 50.0-59.9, ADULT: ICD-10-CM

## 2022-09-12 DIAGNOSIS — B37.9 YEAST INFECTION: ICD-10-CM

## 2022-09-12 DIAGNOSIS — I10 ESSENTIAL HYPERTENSION: ICD-10-CM

## 2022-09-12 DIAGNOSIS — E11.9 TYPE 2 DIABETES MELLITUS WITHOUT COMPLICATION, WITHOUT LONG-TERM CURRENT USE OF INSULIN: Primary | ICD-10-CM

## 2022-09-12 LAB
EXPIRATION DATE: NORMAL
HBA1C MFR BLD: 6 %
Lab: NORMAL

## 2022-09-12 PROCEDURE — 99214 OFFICE O/P EST MOD 30 MIN: CPT | Performed by: NURSE PRACTITIONER

## 2022-09-12 PROCEDURE — 83036 HEMOGLOBIN GLYCOSYLATED A1C: CPT | Performed by: NURSE PRACTITIONER

## 2022-09-12 RX ORDER — LISINOPRIL 10 MG/1
10 TABLET ORAL DAILY
Qty: 90 TABLET | Refills: 1 | Status: SHIPPED | OUTPATIENT
Start: 2022-09-12 | End: 2023-03-24 | Stop reason: SDUPTHER

## 2022-09-12 RX ORDER — FLUCONAZOLE 150 MG/1
150 TABLET ORAL ONCE
Qty: 1 TABLET | Refills: 0 | Status: SHIPPED | OUTPATIENT
Start: 2022-09-12 | End: 2022-09-20

## 2022-09-12 RX ORDER — AMLODIPINE BESYLATE 10 MG/1
10 TABLET ORAL DAILY
Qty: 90 TABLET | Refills: 1 | Status: SHIPPED | OUTPATIENT
Start: 2022-09-12 | End: 2023-03-24 | Stop reason: SDUPTHER

## 2022-09-12 NOTE — PATIENT INSTRUCTIONS
Calorie Counting for Weight Loss  Calories are units of energy. Your body needs a certain number of calories from food to keep going throughout the day. When you eat or drink more calories than your body needs, your body stores the extra calories mostly as fat. When you eat or drink fewer calories than your body needs, your body burns fat to get the energy it needs.  Calorie counting means keeping track of how many calories you eat and drink each day. Calorie counting can be helpful if you need to lose weight. If you eat fewer calories than your body needs, you should lose weight. Ask your health care provider what a healthy weight is for you.  For calorie counting to work, you will need to eat the right number of calories each day to lose a healthy amount of weight per week. A dietitian can help you figure out how many calories you need in a day and will suggest ways to reach your calorie goal.  A healthy amount of weight to lose each week is usually 1-2 lb (0.5-0.9 kg). This usually means that your daily calorie intake should be reduced by 500-750 calories.  Eating 1,200-1,500 calories a day can help most women lose weight.  Eating 1,500-1,800 calories a day can help most men lose weight.  What do I need to know about calorie counting?  Work with your health care provider or dietitian to determine how many calories you should get each day. To meet your daily calorie goal, you will need to:  Find out how many calories are in each food that you would like to eat. Try to do this before you eat.  Decide how much of the food you plan to eat.  Keep a food log. Do this by writing down what you ate and how many calories it had.  To successfully lose weight, it is important to balance calorie counting with a healthy lifestyle that includes regular activity.  Where do I find calorie information?    The number of calories in a food can be found on a Nutrition Facts label. If a food does not have a Nutrition Facts label, try  to look up the calories online or ask your dietitian for help.  Remember that calories are listed per serving. If you choose to have more than one serving of a food, you will have to multiply the calories per serving by the number of servings you plan to eat. For example, the label on a package of bread might say that a serving size is 1 slice and that there are 90 calories in a serving. If you eat 1 slice, you will have eaten 90 calories. If you eat 2 slices, you will have eaten 180 calories.  How do I keep a food log?  After each time that you eat, record the following in your food log as soon as possible:  What you ate. Be sure to include toppings, sauces, and other extras on the food.  How much you ate. This can be measured in cups, ounces, or number of items.  How many calories were in each food and drink.  The total number of calories in the food you ate.  Keep your food log near you, such as in a pocket-sized notebook or on an maged or website on your mobile phone. Some programs will calculate calories for you and show you how many calories you have left to meet your daily goal.  What are some portion-control tips?  Know how many calories are in a serving. This will help you know how many servings you can have of a certain food.  Use a measuring cup to measure serving sizes. You could also try weighing out portions on a kitchen scale. With time, you will be able to estimate serving sizes for some foods.  Take time to put servings of different foods on your favorite plates or in your favorite bowls and cups so you know what a serving looks like.  Try not to eat straight from a food's packaging, such as from a bag or box. Eating straight from the package makes it hard to see how much you are eating and can lead to overeating. Put the amount you would like to eat in a cup or on a plate to make sure you are eating the right portion.  Use smaller plates, glasses, and bowls for smaller portions and to prevent  overeating.  Try not to multitask. For example, avoid watching TV or using your computer while eating. If it is time to eat, sit down at a table and enjoy your food. This will help you recognize when you are full. It will also help you be more mindful of what and how much you are eating.  What are tips for following this plan?  Reading food labels  Check the calorie count compared with the serving size. The serving size may be smaller than what you are used to eating.  Check the source of the calories. Try to choose foods that are high in protein, fiber, and vitamins, and low in saturated fat, trans fat, and sodium.  Shopping  Read nutrition labels while you shop. This will help you make healthy decisions about which foods to buy.  Pay attention to nutrition labels for low-fat or fat-free foods. These foods sometimes have the same number of calories or more calories than the full-fat versions. They also often have added sugar, starch, or salt to make up for flavor that was removed with the fat.  Make a grocery list of lower-calorie foods and stick to it.  Cooking  Try to cook your favorite foods in a healthier way. For example, try baking instead of frying.  Use low-fat dairy products.  Meal planning  Use more fruits and vegetables. One-half of your plate should be fruits and vegetables.  Include lean proteins, such as chicken, turkey, and fish.  Lifestyle  Each week, aim to do one of the followin minutes of moderate exercise, such as walking.  75 minutes of vigorous exercise, such as running.  General information  Know how many calories are in the foods you eat most often. This will help you calculate calorie counts faster.  Find a way of tracking calories that works for you. Get creative. Try different apps or programs if writing down calories does not work for you.  What foods should I eat?    Eat nutritious foods. It is better to have a nutritious, high-calorie food, such as an avocado, than a food with  few nutrients, such as a bag of potato chips.  Use your calories on foods and drinks that will fill you up and will not leave you hungry soon after eating.  Examples of foods that fill you up are nuts and nut butters, vegetables, lean proteins, and high-fiber foods such as whole grains. High-fiber foods are foods with more than 5 g of fiber per serving.  Pay attention to calories in drinks. Low-calorie drinks include water and unsweetened drinks.  The items listed above may not be a complete list of foods and beverages you can eat. Contact a dietitian for more information.  What foods should I limit?  Limit foods or drinks that are not good sources of vitamins, minerals, or protein or that are high in unhealthy fats. These include:  Candy.  Other sweets.  Sodas, specialty coffee drinks, alcohol, and juice.  The items listed above may not be a complete list of foods and beverages you should avoid. Contact a dietitian for more information.  How do I count calories when eating out?  Pay attention to portions. Often, portions are much larger when eating out. Try these tips to keep portions smaller:  Consider sharing a meal instead of getting your own.  If you get your own meal, eat only half of it. Before you start eating, ask for a container and put half of your meal into it.  When available, consider ordering smaller portions from the menu instead of full portions.  Pay attention to your food and drink choices. Knowing the way food is cooked and what is included with the meal can help you eat fewer calories.  If calories are listed on the menu, choose the lower-calorie options.  Choose dishes that include vegetables, fruits, whole grains, low-fat dairy products, and lean proteins.  Choose items that are boiled, broiled, grilled, or steamed. Avoid items that are buttered, battered, fried, or served with cream sauce. Items labeled as crispy are usually fried, unless stated otherwise.  Choose water, low-fat milk,  unsweetened iced tea, or other drinks without added sugar. If you want an alcoholic beverage, choose a lower-calorie option, such as a glass of wine or light beer.  Ask for dressings, sauces, and syrups on the side. These are usually high in calories, so you should limit the amount you eat.  If you want a salad, choose a garden salad and ask for grilled meats. Avoid extra toppings such as samuels, cheese, or fried items. Ask for the dressing on the side, or ask for olive oil and vinegar or lemon to use as dressing.  Estimate how many servings of a food you are given. Knowing serving sizes will help you be aware of how much food you are eating at restaurants.  Where to find more information  Centers for Disease Control and Prevention: www.cdc.gov  [place] Department of Agriculture: myplate.gov  Summary  Calorie counting means keeping track of how many calories you eat and drink each day. If you eat fewer calories than your body needs, you should lose weight.  A healthy amount of weight to lose per week is usually 1-2 lb (0.5-0.9 kg). This usually means reducing your daily calorie intake by 500-750 calories.  The number of calories in a food can be found on a Nutrition Facts label. If a food does not have a Nutrition Facts label, try to look up the calories online or ask your dietitian for help.  Use smaller plates, glasses, and bowls for smaller portions and to prevent overeating.  Use your calories on foods and drinks that will fill you up and not leave you hungry shortly after a meal.  This information is not intended to replace advice given to you by your health care provider. Make sure you discuss any questions you have with your health care provider.  Document Revised: 01/28/2021 Document Reviewed: 01/28/2021  [employer] Patient Education © 2021 [employer]

## 2022-09-12 NOTE — PROGRESS NOTES
Fuad Nielsen presents to [facility] for     Chief Complaint  Chief Complaint   Patient presents with   • Diabetes   • Hypertension   • Atrial Fibrillation         Subjective        History of Present Illness  Her brother passed away today.     C/o yeast infection .   Needs diflucan. Has had before and works well for her       Type 2 DIABETES.  Her last hemoglobin A1c was  6.1% in January 2022  Medications: none  Patient does not check glucose at home.   Current diet: mix of healthy and unhealthy, low-carb high-protein  current exercise: joined a gym, struggles with working out routinely  Last diabetic eye exam : at  [facility]-6/4/2021  Microalbumin  normal 6/2021  Ace: yes-lisinopril  Denies headaches, dizziness, visual disturbances, chest pain, dyspnea, polyuria, polyphagia, paraesthesias, and hypoglycemic episodes.      HTN- chronic, on lisinopril. well controlled overall but BP is a little elevated office today.  BP looked ok at endo a couple weeks ago.  Denies headaches, dizziness, visual disturbances, palpitations chest pain, dyspnea, TIA or CVA symptoms, leg pain/claudication symptoms, and edema.    PAF-noted after Covid in 12/2020.  Felt to be more related to Covid.  She did see Dr. Harris and was anticoagulated for a brief period time but he felt as though that she no longer needed anticoagulation and felt as though aspirin and beta-blocker were sufficient.  She has done well with this.    Rarely has palpitations.  Echo in 1/2020 looked okay.    Saw cardiology, Dr. Harris in 4/2022. Good report rec cont BB ans ASA.     ALINA. Is now on CPAP. She has been using  nasal prong instead of full mask.  Follows with sleep medicine     Multinodular nontoxic goiter- seeing endo .  Now follows with Dr. Aden.  She had a benign FNA in 7/2021.    She last saw Dr. Fried 8/25/2022.  Multinodular goiter was stable.  She plans on following up in 1 year with a  repeat ultrasound at that time.      mammogram required additional imaging.   Breast biopsy X2 was benign she did go back to see Dr. PERRY who was going to do a possible lymph node resection.  However, she tells me that he wanted to repeat an ultrasound first.  Area was unchanged at that time so she will repeat her mammogram in 6 months and at that time may need resection with Dr. PERRY.    does go back next month to see Dr. PERRY for possible surgery for lymph node resection      She is currently following with bariatrics for possible repeat/revised bariatric surgery  She did have a gallbladder ultrasound 9/8/2021 through them which showed some gallstones and fatty liver but was otherwise unremarkable  She saw Dr. Goddard who rec SIPS procedure. She is not sure that she wants to do this   wants to do her 6 months of weight loss visits here, see where she is out, and then may be proceed with surgery  Current diet: high protein, low carb  Exercise: not a lot, struggles with trying to get enough physical activity, going to start 8 week program with her gym soon  Works as a nurse at [employer]  Since her last visit she saw Dr. Goddard and she is not sure that she wants to proceed with revision.          Health Maintenance:   Health Maintenance   Topic Date Due   • ANNUAL PHYSICAL  Never done   • Pneumococcal Vaccine 0-64 (2 - PCV) 10/01/2013   • DIABETIC FOOT EXAM  09/27/2021   • COVID-19 Vaccine (3 - Booster for Moderna series) 01/29/2022   • DIABETIC EYE EXAM  06/04/2022   • URINE MICROALBUMIN  06/29/2022   • INFLUENZA VACCINE  10/01/2022   • PAP SMEAR  03/09/2023   • HEMOGLOBIN A1C  03/12/2023   • TDAP/TD VACCINES (4 - Td or Tdap) 07/16/2028   • HEPATITIS C SCREENING  Completed   • Hepatitis B  Completed       Review of Systems  12 point ROS negative except as noted in HPI above    No Known Allergies  Current Outpatient Medications on File Prior to Visit   Medication Sig Dispense Refill   • amLODIPine (NORVASC) 10 MG tablet Take 1  tablet by mouth Daily. 90 tablet 1   • Blood Glucose Monitoring Suppl (FREESTYLE FREEDOM LITE) w/Device kit Use as directed to test blood sugar once daily 1 each 0   • Etonogestrel (NEXPLANON SC) Inject  under the skin into the appropriate area as directed.     • fluticasone (Flonase) 50 MCG/ACT nasal spray 2 sprays into the nostril(s) as directed by provider Daily. (Patient taking differently: 2 sprays into the nostril(s) as directed by provider Daily As Needed.) 16 g 11   • Lancets misc Use as directed to test blood sugar once Daily. 100 each 11   • levocetirizine (Xyzal) 5 MG tablet Take 1 tablet by mouth Every Evening. 30 tablet 11   • lisinopril (PRINIVIL,ZESTRIL) 10 MG tablet Take 1 tablet by mouth Daily. 90 tablet 1   • metoprolol tartrate (LOPRESSOR) 25 MG tablet Take 0.5 tablets by mouth Every 12 (Twelve) Hours. 90 tablet 3   • naproxen (NAPROSYN) 500 MG tablet Take 1 tablet by mouth 2 (Two) Times a Day As Needed for Mild Pain . 60 tablet 1   • olopatadine (Patanol) 0.1 % ophthalmic solution Administer 1 drop to both eyes 2 (Two) Times a Day. 5 mL 11     No current facility-administered medications on file prior to visit.         The following portions of the patient's history were reviewed and updated as appropriate: allergies, current medications, past family history, past medical history, past social history, past surgical history and problem list and are available for review within electronic record    Objective     Result Review :     The following data was reviewed by: FAMILIA Enriquez on 09/12/2022:  CMP    CMP 1/11/22   Glucose 79   BUN 8   Creatinine 0.85   eGFR African Am 90   Sodium 141   Potassium 4.0   Chloride 106   Calcium 9.4   Albumin 4.40   Total Bilirubin 0.4   Alkaline Phosphatase 78   AST (SGOT) 17   ALT (SGPT) 12           CBC    CBC 1/11/22   WBC 6.42   RBC 4.72   Hemoglobin 12.1   Hematocrit 38.7   MCV 82.0   MCH 25.6 (A)   MCHC 31.3 (A)   RDW 15.5 (A)   Platelets 299   (A)  "Abnormal value            Lipid Panel    Lipid Panel 1/11/22   Total Cholesterol 137   Triglycerides 103   HDL Cholesterol 45   VLDL Cholesterol 19   LDL Cholesterol  73   LDL/HDL Ratio 1.59           TSH    TSH 1/11/22   TSH 1.460           A1C Last 3 Results    HGBA1C Last 3 Results 9/28/21 1/11/22 9/12/22   Hemoglobin A1C 6.0 6.1 6.0                          Vital Signs:   /86   Pulse 96   Temp 98.4 °F (36.9 °C) (Infrared)   Resp 18   Ht 175.3 cm (69\")   Wt (!) 163 kg (360 lb)   SpO2 98%   BMI 53.16 kg/m²         Physical Exam  Vitals and nursing note reviewed.   Constitutional:       General: She is not in acute distress.     Appearance: Normal appearance. She is well-developed. She is morbidly obese. She is not ill-appearing, toxic-appearing or diaphoretic.      Comments: Body mass index is 53.16 kg/m².     HENT:      Head: Normocephalic and atraumatic.   Eyes:      Conjunctiva/sclera: Conjunctivae normal.      Pupils: Pupils are equal, round, and reactive to light.   Neck:      Thyroid: Thyromegaly present. No thyroid mass or thyroid tenderness.      Vascular: Normal carotid pulses. No carotid bruit or JVD.   Cardiovascular:      Rate and Rhythm: Normal rate and regular rhythm.      Heart sounds: Normal heart sounds. No murmur heard.  Pulmonary:      Effort: Pulmonary effort is normal. No respiratory distress.      Breath sounds: Normal breath sounds.   Chest:      Chest wall: No tenderness.   Abdominal:      General: Bowel sounds are normal. There is no distension.      Palpations: Abdomen is soft.      Tenderness: There is no abdominal tenderness.   Musculoskeletal:         General: Normal range of motion.      Cervical back: Normal range of motion.   Skin:     General: Skin is warm and dry.      Coloration: Skin is not pale.      Findings: No erythema or rash.   Neurological:      Mental Status: She is alert and oriented to person, place, and time.      Coordination: Coordination normal. "   Psychiatric:         Behavior: Behavior normal.         Thought Content: Thought content normal.         Judgment: Judgment normal.                 Assessment and Plan      Diagnoses and all orders for this visit:    1. Type 2 diabetes mellitus without complication, without long-term current use of insulin (HCC) (Primary)  -     POC Glycosylated Hemoglobin (Hb A1C)  -     Basic Metabolic Panel; Future  -     lisinopril (PRINIVIL,ZESTRIL) 10 MG tablet; Take 1 tablet by mouth Daily.  Dispense: 90 tablet; Refill: 1  Well-controlled without metformin.  Encouraged ADA diet.  2. Essential hypertension  -     Basic Metabolic Panel; Future  -     lisinopril (PRINIVIL,ZESTRIL) 10 MG tablet; Take 1 tablet by mouth Daily.  Dispense: 90 tablet; Refill: 1  -     amLODIPine (NORVASC) 10 MG tablet; Take 1 tablet by mouth Daily.  Dispense: 90 tablet; Refill: 1  Well-controlled.  Continue lisinopril and amlodipine  3. Paroxysmal atrial fibrillation (HCC)  -     Basic Metabolic Panel; Future  She has been asymptomatic.  Continue beta-blocker and aspirin per cardiology recommendations.  4. Morbid obesity with BMI of 50.0-59.9, adult (HCC)  -     Basic Metabolic Panel; Future  -     Ambulatory Referral to Nutrition Services  Class 3 Severe Obesity (BMI >=40). Obesity-related health conditions include the following: obstructive sleep apnea, hypertension, diabetes mellitus and dyslipidemias. Obesity is unchanged. BMI is is above average; BMI management plan is completed. We discussed low calorie, low carb based diet program, portion control, increasing exercise and Refer to nutritionist, continue to FU wioth bariatrics as well.    5. Yeast infection  -     fluconazole (Diflucan) 150 MG tablet; Take 1 tablet by mouth 1 (One) Time for 1 dose.  Dispense: 1 tablet; Refill: 0  Diflucan x1.  If symptoms persist she may need to have full evaluation/exam.      Follow Up     Patient was given instructions and counseling regarding her condition  or for health maintenance advice. Please see specific information pulled into the AVS if appropriate.   Any new medication's adverse effects have been discussed in detail with patient  Patient was encouraged to keep me informed of any acute changes, lack of improvement, or any new concerning symptoms.    Return in about 3 months (around 12/12/2022) for chronic condition follow up.          Dictated Utilizing Dragon Dictation   Please note that portions of this note were completed with a voice recognition program.   Part of this note may be an electronic transcription/translation of spoken language to printed text using the Dragon Dictation System.

## 2022-09-12 NOTE — TELEPHONE ENCOUNTER
Pt is requesting Diflucan for a yeast infection that has been off and on for two months. Has tried monistat. Requesting this Rx goes to [facility] pharmacy.

## 2022-09-19 ENCOUNTER — HOSPITAL ENCOUNTER (OUTPATIENT)
Dept: ULTRASOUND IMAGING | Facility: HOSPITAL | Age: 42
Discharge: HOME | End: 2022-09-19

## 2022-09-19 ENCOUNTER — HOSPITAL ENCOUNTER (OUTPATIENT)
Dept: MAMMOGRAPHY | Facility: HOSPITAL | Age: 42
Discharge: HOME | End: 2022-09-19

## 2022-09-19 DIAGNOSIS — R92.8 ABNORMAL MAMMOGRAM: ICD-10-CM

## 2022-09-19 PROCEDURE — G0279 TOMOSYNTHESIS, MAMMO: HCPCS

## 2022-09-19 PROCEDURE — 76642 ULTRASOUND BREAST LIMITED: CPT | Performed by: RADIOLOGY

## 2022-09-19 PROCEDURE — 77066 DX MAMMO INCL CAD BI: CPT

## 2022-09-19 PROCEDURE — 76642 ULTRASOUND BREAST LIMITED: CPT

## 2022-09-19 PROCEDURE — 77066 DX MAMMO INCL CAD BI: CPT | Performed by: RADIOLOGY

## 2022-09-19 PROCEDURE — 77062 BREAST TOMOSYNTHESIS BI: CPT | Performed by: RADIOLOGY

## 2023-03-23 ENCOUNTER — LAB (OUTPATIENT)
Dept: INTERNAL MEDICINE | Facility: CLINIC | Age: 43
End: 2023-03-23
Payer: COMMERCIAL

## 2023-03-23 DIAGNOSIS — I48.0 PAROXYSMAL ATRIAL FIBRILLATION: ICD-10-CM

## 2023-03-23 DIAGNOSIS — I10 ESSENTIAL HYPERTENSION: ICD-10-CM

## 2023-03-23 DIAGNOSIS — E66.01 MORBID OBESITY WITH BMI OF 50.0-59.9, ADULT: ICD-10-CM

## 2023-03-23 DIAGNOSIS — E11.9 TYPE 2 DIABETES MELLITUS WITHOUT COMPLICATION, WITHOUT LONG-TERM CURRENT USE OF INSULIN: ICD-10-CM

## 2023-03-23 LAB
ANION GAP SERPL CALCULATED.3IONS-SCNC: 8 MMOL/L (ref 5–15)
BUN SERPL-MCNC: 8 MG/DL (ref 6–20)
BUN/CREAT SERPL: 9.5 (ref 7–25)
CALCIUM SPEC-SCNC: 9.5 MG/DL (ref 8.6–10.5)
CHLORIDE SERPL-SCNC: 104 MMOL/L (ref 98–107)
CO2 SERPL-SCNC: 27 MMOL/L (ref 22–29)
CREAT SERPL-MCNC: 0.84 MG/DL (ref 0.57–1)
EGFRCR SERPLBLD CKD-EPI 2021: 89.7 ML/MIN/1.73
GLUCOSE SERPL-MCNC: 101 MG/DL (ref 65–99)
POTASSIUM SERPL-SCNC: 4 MMOL/L (ref 3.5–5.2)
SODIUM SERPL-SCNC: 139 MMOL/L (ref 136–145)

## 2023-03-23 PROCEDURE — 80061 LIPID PANEL: CPT | Performed by: NURSE PRACTITIONER

## 2023-03-23 PROCEDURE — 80053 COMPREHEN METABOLIC PANEL: CPT | Performed by: NURSE PRACTITIONER

## 2023-03-23 PROCEDURE — 84443 ASSAY THYROID STIM HORMONE: CPT | Performed by: NURSE PRACTITIONER

## 2023-03-24 ENCOUNTER — OFFICE VISIT (OUTPATIENT)
Dept: INTERNAL MEDICINE | Facility: CLINIC | Age: 43
End: 2023-03-24
Payer: COMMERCIAL

## 2023-03-24 VITALS
WEIGHT: 293 LBS | HEIGHT: 69 IN | RESPIRATION RATE: 18 BRPM | DIASTOLIC BLOOD PRESSURE: 98 MMHG | OXYGEN SATURATION: 96 % | BODY MASS INDEX: 43.4 KG/M2 | HEART RATE: 78 BPM | SYSTOLIC BLOOD PRESSURE: 158 MMHG | TEMPERATURE: 97.1 F

## 2023-03-24 DIAGNOSIS — I10 ESSENTIAL HYPERTENSION: ICD-10-CM

## 2023-03-24 DIAGNOSIS — G56.03 BILATERAL CARPAL TUNNEL SYNDROME: ICD-10-CM

## 2023-03-24 DIAGNOSIS — M79.671 BILATERAL FOOT PAIN: ICD-10-CM

## 2023-03-24 DIAGNOSIS — E66.01 MORBID OBESITY WITH BMI OF 50.0-59.9, ADULT: Chronic | ICD-10-CM

## 2023-03-24 DIAGNOSIS — E11.9 TYPE 2 DIABETES MELLITUS WITHOUT COMPLICATION, WITHOUT LONG-TERM CURRENT USE OF INSULIN: Primary | ICD-10-CM

## 2023-03-24 DIAGNOSIS — M79.672 BILATERAL FOOT PAIN: ICD-10-CM

## 2023-03-24 DIAGNOSIS — M54.41 ACUTE MIDLINE LOW BACK PAIN WITH RIGHT-SIDED SCIATICA: ICD-10-CM

## 2023-03-24 LAB
ALBUMIN SERPL-MCNC: 4.1 G/DL (ref 3.5–5.2)
ALBUMIN/GLOB SERPL: 1.2 G/DL
ALP SERPL-CCNC: 70 U/L (ref 39–117)
ALT SERPL W P-5'-P-CCNC: 16 U/L (ref 1–33)
ANION GAP SERPL CALCULATED.3IONS-SCNC: 13.3 MMOL/L (ref 5–15)
AST SERPL-CCNC: 18 U/L (ref 1–32)
BILIRUB SERPL-MCNC: 0.2 MG/DL (ref 0–1.2)
BUN SERPL-MCNC: 8 MG/DL (ref 6–20)
BUN/CREAT SERPL: 7.6 (ref 7–25)
CALCIUM SPEC-SCNC: 9.9 MG/DL (ref 8.6–10.5)
CHLORIDE SERPL-SCNC: 103 MMOL/L (ref 98–107)
CHOLEST SERPL-MCNC: 147 MG/DL (ref 0–200)
CO2 SERPL-SCNC: 23.7 MMOL/L (ref 22–29)
CREAT SERPL-MCNC: 1.05 MG/DL (ref 0.57–1)
EGFRCR SERPLBLD CKD-EPI 2021: 68.6 ML/MIN/1.73
EXPIRATION DATE: NORMAL
GLOBULIN UR ELPH-MCNC: 3.3 GM/DL
GLUCOSE SERPL-MCNC: 92 MG/DL (ref 65–99)
HBA1C MFR BLD: 6.1 %
HDLC SERPL-MCNC: 51 MG/DL (ref 40–60)
LDLC SERPL CALC-MCNC: 76 MG/DL (ref 0–100)
LDLC/HDLC SERPL: 1.44 {RATIO}
Lab: NORMAL
POTASSIUM SERPL-SCNC: 4.1 MMOL/L (ref 3.5–5.2)
PROT SERPL-MCNC: 7.4 G/DL (ref 6–8.5)
SODIUM SERPL-SCNC: 140 MMOL/L (ref 136–145)
TRIGL SERPL-MCNC: 112 MG/DL (ref 0–150)
TSH SERPL DL<=0.05 MIU/L-ACNC: 3.34 UIU/ML (ref 0.27–4.2)
VLDLC SERPL-MCNC: 20 MG/DL (ref 5–40)

## 2023-03-24 PROCEDURE — 90677 PCV20 VACCINE IM: CPT | Performed by: NURSE PRACTITIONER

## 2023-03-24 PROCEDURE — 90471 IMMUNIZATION ADMIN: CPT | Performed by: NURSE PRACTITIONER

## 2023-03-24 PROCEDURE — 83036 HEMOGLOBIN GLYCOSYLATED A1C: CPT | Performed by: NURSE PRACTITIONER

## 2023-03-24 PROCEDURE — 99214 OFFICE O/P EST MOD 30 MIN: CPT | Performed by: NURSE PRACTITIONER

## 2023-03-24 RX ORDER — NAPROXEN 500 MG/1
500 TABLET ORAL 2 TIMES DAILY PRN
Qty: 60 TABLET | Refills: 1 | Status: SHIPPED | OUTPATIENT
Start: 2023-03-24

## 2023-03-24 RX ORDER — AMLODIPINE BESYLATE 10 MG/1
10 TABLET ORAL DAILY
Qty: 90 TABLET | Refills: 1 | Status: SHIPPED | OUTPATIENT
Start: 2023-03-24

## 2023-03-24 RX ORDER — LISINOPRIL 20 MG/1
20 TABLET ORAL DAILY
Qty: 90 TABLET | Refills: 1 | Status: SHIPPED | OUTPATIENT
Start: 2023-03-24

## 2023-03-24 RX ORDER — CYCLOBENZAPRINE HCL 5 MG
5 TABLET ORAL NIGHTLY PRN
Qty: 30 TABLET | Refills: 0 | Status: SHIPPED | OUTPATIENT
Start: 2023-03-24

## 2023-03-24 RX ORDER — SEMAGLUTIDE 0.68 MG/ML
0.25 INJECTION, SOLUTION SUBCUTANEOUS WEEKLY
Qty: 3 ML | Refills: 1 | Status: SHIPPED | OUTPATIENT
Start: 2023-03-24

## 2023-03-24 NOTE — LETTER
"VACCINE CONSENT FORM      Patient Name:  Fuad Nielsen    Patient :  1980      I/We have read, or have been explained, the information about the diseases and the vaccines listed below.  There was an opportunity to ask questions and any questions were answered satisfactorily.  I/We believe that I/we understand the benefits and risks of the vaccines(s) cited, and ask the vaccine(s) listed below be given to me/us or the person named above (for which i have authorized to make the request).      Vaccine(s) give:    Orders Placed This Encounter   Procedures   • Pneumococcal Conjugate Vaccine 20-Valent (PCV20)         Medicare patients:    The only vaccine covered under your medical benefit is flu/pneumonia and hepatitis B.  All other may be covered under your \"Part D\" prescription plan and requires you to go to a pharmacy with a Physician orders for administration.  If you still prefer to have it administered at our office, you will receive a bill for the vaccine and administration cost.               Patient Initials                     Patient or Parent/Guardian Signature                    Date        A copy of the appropriate Centers for Disease Control and Prevention Vaccine Information Statements has been provided.   "

## 2023-03-28 ENCOUNTER — TELEPHONE (OUTPATIENT)
Dept: INTERNAL MEDICINE | Facility: CLINIC | Age: 43
End: 2023-03-28
Payer: COMMERCIAL

## 2023-03-28 NOTE — TELEPHONE ENCOUNTER
Your labs are in acceptable ranges/are stable. Please continue your current treatment plan and/or medications.   Written by FAMILIA Galaviz on 3/24/2023 10:04 AM EDT      Pt notified. Verbalized good understanding

## 2023-04-07 ENCOUNTER — PRIOR AUTHORIZATION (OUTPATIENT)
Dept: INTERNAL MEDICINE | Facility: CLINIC | Age: 43
End: 2023-04-07
Payer: COMMERCIAL

## 2023-04-07 ENCOUNTER — TELEPHONE (OUTPATIENT)
Dept: INTERNAL MEDICINE | Facility: CLINIC | Age: 43
End: 2023-04-07
Payer: COMMERCIAL

## 2023-04-19 NOTE — TELEPHONE ENCOUNTER
Message from Plan  PA Case: [id number], Status: Approved, Coverage Starts on: 4/7/2023 12:00 AM, Coverage Ends on: 4/7/2024 12:00 AM. Questions? Contact [phone].

## 2023-05-09 ENCOUNTER — OFFICE VISIT (OUTPATIENT)
Dept: INTERNAL MEDICINE | Facility: CLINIC | Age: 43
End: 2023-05-09
Payer: COMMERCIAL

## 2023-05-09 VITALS
BODY MASS INDEX: 43.4 KG/M2 | TEMPERATURE: 97.3 F | HEART RATE: 68 BPM | RESPIRATION RATE: 18 BRPM | DIASTOLIC BLOOD PRESSURE: 82 MMHG | WEIGHT: 293 LBS | HEIGHT: 69 IN | OXYGEN SATURATION: 96 % | SYSTOLIC BLOOD PRESSURE: 132 MMHG

## 2023-05-09 DIAGNOSIS — Z91.09 ENVIRONMENTAL ALLERGIES: ICD-10-CM

## 2023-05-09 DIAGNOSIS — R09.81 SINUS CONGESTION: ICD-10-CM

## 2023-05-09 DIAGNOSIS — R11.0 NAUSEA: ICD-10-CM

## 2023-05-09 DIAGNOSIS — I10 ESSENTIAL HYPERTENSION: ICD-10-CM

## 2023-05-09 DIAGNOSIS — J01.90 ACUTE BACTERIAL SINUSITIS: ICD-10-CM

## 2023-05-09 DIAGNOSIS — B96.89 ACUTE BACTERIAL SINUSITIS: ICD-10-CM

## 2023-05-09 DIAGNOSIS — E11.9 TYPE 2 DIABETES MELLITUS WITHOUT COMPLICATION, WITHOUT LONG-TERM CURRENT USE OF INSULIN: ICD-10-CM

## 2023-05-09 DIAGNOSIS — R42 DIZZINESS: Primary | ICD-10-CM

## 2023-05-09 LAB — GLUCOSE BLDC GLUCOMTR-MCNC: 101 MG/DL (ref 70–130)

## 2023-05-09 PROCEDURE — 99214 OFFICE O/P EST MOD 30 MIN: CPT | Performed by: NURSE PRACTITIONER

## 2023-05-09 PROCEDURE — 82962 GLUCOSE BLOOD TEST: CPT | Performed by: NURSE PRACTITIONER

## 2023-05-09 RX ORDER — FLUTICASONE PROPIONATE 50 MCG
2 SPRAY, SUSPENSION (ML) NASAL DAILY
Qty: 16 G | Refills: 11 | Status: SHIPPED | OUTPATIENT
Start: 2023-05-09

## 2023-05-09 RX ORDER — CEFUROXIME AXETIL 250 MG/1
250 TABLET ORAL 2 TIMES DAILY
Qty: 20 TABLET | Refills: 0 | Status: SHIPPED | OUTPATIENT
Start: 2023-05-09 | End: 2023-05-19

## 2023-05-09 RX ORDER — MECLIZINE HYDROCHLORIDE 25 MG/1
25 TABLET ORAL 3 TIMES DAILY PRN
Qty: 30 TABLET | Refills: 0 | Status: SHIPPED | OUTPATIENT
Start: 2023-05-09

## 2023-05-09 RX ORDER — ONDANSETRON 4 MG/1
4 TABLET, ORALLY DISINTEGRATING ORAL EVERY 8 HOURS PRN
Qty: 30 TABLET | Refills: 0 | Status: SHIPPED | OUTPATIENT
Start: 2023-05-09

## 2023-05-09 RX ORDER — LEVOCETIRIZINE DIHYDROCHLORIDE 5 MG/1
5 TABLET, FILM COATED ORAL EVERY EVENING
Qty: 30 TABLET | Refills: 11 | Status: SHIPPED | OUTPATIENT
Start: 2023-05-09

## 2023-05-09 NOTE — PROGRESS NOTES
Fuad Nielsen presents to [facility] for     Chief Complaint  Chief Complaint   Patient presents with   • Headache     Pt states she has had headaches for the last 3 days, in the back of her neck, states her eyes hurt   • Dizziness     Pt states she opens her eyes and the whole room is spinning       PCP:  Mandie Tobar APRN    Subjective        History of Present Illness  Drank a bit too much wine on Saturday and felt like she may be hung over but headaches have persisted.   vertigo when lying in bed-occurs with position changes   feels like eyes are dilated but thinks that her pupils are smaller than normal.  She has seen a few floaters intermittently  Feels tired, does not want to hold eyes open - she feels like lights are too bright and her eyes just hurt.    pressure behind eyes.    tried heat to face and neck and did not help    took naproxen with minimal relief   drank gatorade to be sure not dehydrated.     She has HTN. BP looks better in office today than has in the past.   She feels nauseated but not that she will vomit.   No allergy issues recently.     Her last eye exam was around September with [facility] in [facility]      Review of Systems   Constitutional: Negative.    HENT: Positive for congestion and sinus pressure. Negative for facial swelling, postnasal drip, rhinorrhea, sneezing, sore throat, swollen glands, tinnitus, trouble swallowing and voice change.    Eyes: Positive for pain and visual disturbance. Negative for blurred vision, double vision, photophobia, discharge, redness and itching.   Respiratory: Negative for cough, chest tightness and shortness of breath.    Cardiovascular: Negative for chest pain, palpitations and leg swelling.   Gastrointestinal: Negative.    Neurological: Positive for dizziness and headache. Negative for weakness.         No Known Allergies  Current Outpatient Medications on File Prior to  Visit   Medication Sig Dispense Refill   • amLODIPine (NORVASC) 10 MG tablet Take 1 tablet by mouth Daily. 90 tablet 1   • Blood Glucose Monitoring Suppl (FREESTYLE FREEDOM LITE) w/Device kit Use as directed to test blood sugar once daily 1 each 0   • cyclobenzaprine (FLEXERIL) 5 MG tablet Take 1 tablet by mouth At Night As Needed for Muscle Spasms. 30 tablet 0   • Etonogestrel (NEXPLANON SC) Inject  under the skin into the appropriate area as directed.     • Lancets misc Use as directed to test blood sugar once Daily. 100 each 11   • lisinopril (PRINIVIL,ZESTRIL) 20 MG tablet Take 1 tablet by mouth Daily. 90 tablet 1   • metoprolol tartrate (LOPRESSOR) 25 MG tablet Take 0.5 tablets by mouth Every 12 (Twelve) Hours. 90 tablet 3   • naproxen (NAPROSYN) 500 MG tablet Take 1 tablet by mouth 2 (Two) Times a Day As Needed for Mild Pain. 60 tablet 1   • olopatadine (Patanol) 0.1 % ophthalmic solution Administer 1 drop to both eyes 2 (Two) Times a Day. (Patient taking differently: Administer 1 drop to both eyes 2 (Two) Times a Day. Pt is using PRN) 5 mL 11   • Semaglutide,0.25 or 0.5MG/DOS, (Ozempic, 0.25 or 0.5 MG/DOSE,) 2 MG/3ML solution pen-injector Inject 0.25 mg under the skin into the appropriate area as directed 1 (One) Time Per Week. 3 mL 1   • [DISCONTINUED] fluticasone (Flonase) 50 MCG/ACT nasal spray 2 sprays into the nostril(s) as directed by provider Daily. (Patient taking differently: 2 sprays into the nostril(s) as directed by provider Daily As Needed.) 16 g 11   • [DISCONTINUED] levocetirizine (Xyzal) 5 MG tablet Take 1 tablet by mouth Every Evening. 30 tablet 11     No current facility-administered medications on file prior to visit.         The following portions of the patient's history were reviewed and updated as appropriate: allergies, current medications, past family history, past medical history, past social history, past surgical history and problem list and are available for review within  "electronic record    Objective     Result Review :     The following data was reviewed by: FAMILIA Enriquez on 05/09/2023:    Results for orders placed or performed in visit on 05/09/23   POCT Glucose    Specimen: Blood   Result Value Ref Range    Glucose 101 70 - 130 mg/dL     Lab Results   Component Value Date    HGBA1C 6.1 03/24/2023                Vital Signs:   /82 (BP Location: Right arm, Patient Position: Sitting, Cuff Size: Large Adult)   Pulse 68   Temp 97.3 °F (36.3 °C) (Infrared)   Resp 18   Ht 175.3 cm (69\")   Wt (!) 169 kg (372 lb)   SpO2 96%   BMI 54.93 kg/m²         Physical Exam  Constitutional:       Appearance: Normal appearance. She is well-developed. She is not ill-appearing, toxic-appearing or diaphoretic.   HENT:      Head: Normocephalic and atraumatic.      Ears:      Comments: Bilateral TMs slightly dull     Nose: Mucosal edema, congestion and rhinorrhea present.      Right Turbinates: Enlarged and swollen.      Left Turbinates: Enlarged and swollen.      Right Sinus: Maxillary sinus tenderness and frontal sinus tenderness present.      Left Sinus: Maxillary sinus tenderness and frontal sinus tenderness present.      Mouth/Throat:      Lips: Pink.      Pharynx: Oropharynx is clear.   Eyes:      General: Lids are normal. Vision grossly intact. No scleral icterus.     Extraocular Movements: Extraocular movements intact.      Conjunctiva/sclera: Conjunctivae normal.      Pupils: Pupils are equal, round, and reactive to light. Pupils are equal.      Funduscopic exam:     Right eye: No hemorrhage, exudate, AV nicking or papilledema. Red reflex and venous pulsations present.         Left eye: No hemorrhage, exudate, AV nicking, arteriolar narrowing or papilledema. Red reflex and venous pulsations present.  Cardiovascular:      Rate and Rhythm: Normal rate and regular rhythm.      Heart sounds: Normal heart sounds.   Pulmonary:      Effort: Pulmonary effort is normal. No " respiratory distress.      Breath sounds: Normal breath sounds.   Abdominal:      General: Bowel sounds are normal.      Palpations: Abdomen is soft.      Tenderness: There is no abdominal tenderness.   Musculoskeletal:         General: Normal range of motion.      Cervical back: Normal range of motion.      Right lower leg: No edema.      Left lower leg: No edema.   Skin:     General: Skin is warm and dry.   Neurological:      General: No focal deficit present.      Mental Status: She is alert and oriented to person, place, and time.   Psychiatric:         Attention and Perception: Attention normal.         Mood and Affect: Mood and affect normal.         Behavior: Behavior normal. Behavior is cooperative.         Thought Content: Thought content normal.         Cognition and Memory: Cognition normal.         Judgment: Judgment normal.                 Assessment and Plan      Diagnoses and all orders for this visit:    1. Dizziness (Primary)  -     POCT Glucose  -     meclizine (ANTIVERT) 25 MG tablet; Take 1 tablet by mouth 3 (Three) Times a Day As Needed for Dizziness.  Dispense: 30 tablet; Refill: 0    2. Acute bacterial sinusitis  -     cefuroxime (CEFTIN) 250 MG tablet; Take 1 tablet by mouth 2 (Two) Times a Day for 10 days.  Dispense: 20 tablet; Refill: 0    3. Sinus congestion    4. Nausea  -     ondansetron ODT (ZOFRAN-ODT) 4 MG disintegrating tablet; Place 1 tablet on the tongue Every 8 (Eight) Hours As Needed for Nausea or Vomiting.  Dispense: 30 tablet; Refill: 0    5. Environmental allergies  -     levocetirizine (XYZAL) 5 MG tablet; Take 1 tablet by mouth Every Evening.  Dispense: 30 tablet; Refill: 11  -     fluticasone (FLONASE) 50 MCG/ACT nasal spray; Instill 2 sprays into the nostril(s) as directed by provider Daily.  Dispense: 16 g; Refill: 11    6. Essential hypertension    7. Type 2 diabetes mellitus without complication, without long-term current use of insulin    She appears well and in no  acute distress.  Her blood pressure looks okay, blood glucose was stable at 101 in office today.  Exam consistent with likely acute bacterial sinusitis.  She also has dullness to bilateral TMs.  We will treat sinus infection with Ceftin, start her on some Flonase and Xyzal , and provide meclizine and Zofran as needed for dizziness and nausea respectively.  I have asked her also to go ahead and schedule an appointment to see her ophthalmologist today for further evaluation.  Ocular exam is limited in office but appears normal thus far.  Follow-up immediately for any worsening of symptoms or present to ER.    Follow Up     Patient was given instructions and counseling regarding her condition or for health maintenance advice. Please see specific information pulled into the AVS if appropriate.   Any new medication's adverse effects have been discussed in detail with patient  Patient was encouraged to keep me informed of any acute changes, lack of improvement, or any new concerning symptoms.    Return if symptoms worsen or fail to improve.          Dictated Utilizing Dragon Dictation   Please note that portions of this note were completed with a voice recognition program.   Part of this note may be an electronic transcription/translation of spoken language to printed text using the Dragon Dictation System.

## 2023-05-24 ENCOUNTER — OFFICE VISIT (OUTPATIENT)
Dept: INTERNAL MEDICINE | Facility: CLINIC | Age: 43
End: 2023-05-24
Payer: COMMERCIAL

## 2023-05-24 VITALS
DIASTOLIC BLOOD PRESSURE: 74 MMHG | RESPIRATION RATE: 18 BRPM | HEIGHT: 69 IN | TEMPERATURE: 97.3 F | OXYGEN SATURATION: 99 % | BODY MASS INDEX: 43.4 KG/M2 | SYSTOLIC BLOOD PRESSURE: 126 MMHG | WEIGHT: 293 LBS | HEART RATE: 88 BPM

## 2023-05-24 DIAGNOSIS — I10 ESSENTIAL HYPERTENSION: ICD-10-CM

## 2023-05-24 DIAGNOSIS — R09.81 SINUS CONGESTION: ICD-10-CM

## 2023-05-24 DIAGNOSIS — E66.01 MORBID OBESITY WITH BMI OF 50.0-59.9, ADULT: ICD-10-CM

## 2023-05-24 DIAGNOSIS — E11.9 TYPE 2 DIABETES MELLITUS WITHOUT COMPLICATION, WITHOUT LONG-TERM CURRENT USE OF INSULIN: Primary | ICD-10-CM

## 2023-05-24 DIAGNOSIS — T36.95XA ANTIBIOTIC-INDUCED YEAST INFECTION: ICD-10-CM

## 2023-05-24 DIAGNOSIS — B37.9 ANTIBIOTIC-INDUCED YEAST INFECTION: ICD-10-CM

## 2023-05-24 RX ORDER — FLUCONAZOLE 150 MG/1
150 TABLET ORAL ONCE
Qty: 2 TABLET | Refills: 0 | Status: SHIPPED | OUTPATIENT
Start: 2023-05-24 | End: 2023-05-25

## 2023-05-24 RX ORDER — SEMAGLUTIDE 0.68 MG/ML
0.5 INJECTION, SOLUTION SUBCUTANEOUS WEEKLY
Qty: 3 ML | Refills: 1 | Status: SHIPPED | OUTPATIENT
Start: 2023-05-24

## 2023-05-24 NOTE — PROGRESS NOTES
"Subjective   Fuad Nielsen is a 43 y.o. female.     Chief Complaint   Patient presents with   • Diabetes     Follow up on Ozempic, pt states she is tolerating it okay    • Hypertension   • Obesity       PCP: Mandie Tobar APRN    History of Present Illness   The patient presents to the office today for follow-up on diabetes mellitus type 2, hypertension, and obesity. At her visit on 03/24/2023, she was started on Ozempic. She reports she is tolerating this medication well and has lost 5 pounds since that visit. Her last hemoglobin A1c, on 03/24/2023, was 6.1 percent. Her lipid panel obtained on 03/24/2023, appeared within normal limits with an LDL of 76 mg/dL. Her CMP was stable on 03/24/2023.     The patient complains of continued pressure and \"heaviness\" superior to and posterior to her eyes as well as to the posterior aspect of her head. Additionally she complains of a sensation that her eyes are dilated and notes slight nausea. She denies any pruritic or watery eyes, but notes she felt as if she was drowning in nasal secretions upon waking yesterday before work, 05/23/2023. The patient reports drainage that elicits a cough. She states she saw ophthalmology where she underwent a scan with no changes since her last visit in 09/2022. She notes she will complete her course of antibiotics today and has taken meclizine a few times. However, the patient is unable to take it prior to work as it causes drowsiness. She states she uses Flonase, but has difficulty remembering to take Xyzal in the mornings on the days she works night shift. She notes she has taken Zofran for a \"seasick-type feeling.\" The patient adds that she is sleeping with a heating pad on her face and on her neck.    She requests Diflucan for complaints of a yeast infection since being on an antibiotic for her sinus infection.     The patient endorses lower extremity swelling, but attributes this standing on her feet for 4 hours in a row at " work last night, 05/23/2023. She does not add salt to her foods and is not experiencing any chest pain or shortness of breath.     The patient states her blood pressure is well controlled at home    The following portions of the patient's history were reviewed and updated as appropriate: allergies, current medications, past family history, past medical history, past social history, past surgical history and problem list.        Review of Systems   Constitutional: Negative for fatigue, fever and unexpected weight loss.   HENT: Positive for sinus pressure (and pain).    Eyes: Negative for blurred vision, double vision and visual disturbance.   Respiratory: Positive for cough (secondary to nasal drainage). Negative for shortness of breath and wheezing.    Cardiovascular: Negative for chest pain, palpitations and leg swelling.   Gastrointestinal: Positive for nausea. Negative for abdominal pain, constipation, diarrhea and vomiting.   Genitourinary: Negative for difficulty urinating, frequency and urgency.        Yeast infection.   Musculoskeletal: Negative for arthralgias and myalgias.        Lower extremity swelling.   Skin: Negative for color change and rash.   Neurological: Negative for dizziness, weakness and headache.   Hematological: Negative for adenopathy. Does not bruise/bleed easily.           Outpatient Medications Marked as Taking for the 5/24/23 encounter (Office Visit) with Mandie Tobar APRN   Medication Sig Dispense Refill   • amLODIPine (NORVASC) 10 MG tablet Take 1 tablet by mouth Daily. 90 tablet 1   • Blood Glucose Monitoring Suppl (FREESTYLE FREEDOM LITE) w/Device kit Use as directed to test blood sugar once daily 1 each 0   • cyclobenzaprine (FLEXERIL) 5 MG tablet Take 1 tablet by mouth At Night As Needed for Muscle Spasms. 30 tablet 0   • Etonogestrel (NEXPLANON SC) Inject  under the skin into the appropriate area as directed.     • fluticasone (FLONASE) 50 MCG/ACT nasal spray Instill 2 sprays  into the nostril(s) as directed by provider Daily. 16 g 11   • Lancets misc Use as directed to test blood sugar once Daily. 100 each 11   • levocetirizine (XYZAL) 5 MG tablet Take 1 tablet by mouth Every Evening. 30 tablet 11   • lisinopril (PRINIVIL,ZESTRIL) 20 MG tablet Take 1 tablet by mouth Daily. 90 tablet 1   • meclizine (ANTIVERT) 25 MG tablet Take 1 tablet by mouth 3 (Three) Times a Day As Needed for Dizziness. 30 tablet 0   • metoprolol tartrate (LOPRESSOR) 25 MG tablet Take 0.5 tablets by mouth Every 12 (Twelve) Hours. 90 tablet 3   • naproxen (NAPROSYN) 500 MG tablet Take 1 tablet by mouth 2 (Two) Times a Day As Needed for Mild Pain. 60 tablet 1   • olopatadine (Patanol) 0.1 % ophthalmic solution Administer 1 drop to both eyes 2 (Two) Times a Day. (Patient taking differently: Administer 1 drop to both eyes 2 (Two) Times a Day. Pt is using PRN) 5 mL 11   • ondansetron ODT (ZOFRAN-ODT) 4 MG disintegrating tablet Place 1 tablet on the tongue Every 8 (Eight) Hours As Needed for Nausea or Vomiting. 30 tablet 0   • Semaglutide,0.25 or 0.5MG/DOS, (Ozempic, 0.25 or 0.5 MG/DOSE,) 2 MG/3ML solution pen-injector Inject 0.5 mg under the skin into the appropriate area as directed 1 (One) Time Per Week. 3 mL 1   • [DISCONTINUED] Semaglutide,0.25 or 0.5MG/DOS, (Ozempic, 0.25 or 0.5 MG/DOSE,) 2 MG/3ML solution pen-injector Inject 0.25 mg under the skin into the appropriate area as directed 1 (One) Time Per Week. 3 mL 1     No Known Allergies      Objective   Physical Exam  Constitutional:       Appearance: Normal appearance. She is well-developed. She is obese.   HENT:      Head: Normocephalic and atraumatic.      Comments: Sinus congestion with sinus tenderness on the frontal and maxillary region.      Ears:      Comments: Bilateral tympanic membranes are slightly dull with clear effusions. No erythema.     Nose: Rhinorrhea (Clear) present.   Eyes:      General: No scleral icterus.     Conjunctiva/sclera: Conjunctivae  "normal.   Cardiovascular:      Rate and Rhythm: Normal rate and regular rhythm.      Heart sounds: Normal heart sounds.   Pulmonary:      Effort: Pulmonary effort is normal. No respiratory distress.      Breath sounds: Normal breath sounds.   Abdominal:      General: Bowel sounds are normal.      Palpations: Abdomen is soft.      Tenderness: There is no abdominal tenderness.   Musculoskeletal:         General: Normal range of motion.      Cervical back: Normal range of motion.      Right lower leg: No edema.      Left lower leg: No edema.   Skin:     General: Skin is warm and dry.   Neurological:      General: No focal deficit present.      Mental Status: She is alert and oriented to person, place, and time.   Psychiatric:         Attention and Perception: Attention normal.         Mood and Affect: Mood and affect normal.         Behavior: Behavior normal. Behavior is cooperative.         Thought Content: Thought content normal.         Cognition and Memory: Cognition normal.         Judgment: Judgment normal.         Vitals:    05/24/23 0852   BP: 126/74   BP Location: Right arm   Patient Position: Sitting   Cuff Size: Large Adult   Pulse: 88   Resp: 18   Temp: 97.3 °F (36.3 °C)   TempSrc: Infrared   SpO2: 99%   Weight: (!) 166 kg (367 lb)   Height: 175.3 cm (69\")   PainSc:   4   PainLoc: Eye  Comment: behind eyes, from recent sinus infection     Body mass index is 54.2 kg/m².  Wt Readings from Last 3 Encounters:   05/24/23 (!) 166 kg (367 lb)   05/09/23 (!) 169 kg (372 lb)   03/24/23 (!) 169 kg (372 lb 3.2 oz)             Assessment & Plan   Diagnoses and all orders for this visit:    1. Type 2 diabetes mellitus without complication, without long-term current use of insulin (Primary)  -     Semaglutide,0.25 or 0.5MG/DOS, (Ozempic, 0.25 or 0.5 MG/DOSE,) 2 MG/3ML solution pen-injector; Inject 0.5 mg under the skin into the appropriate area as directed 1 (One) Time Per Week.  Dispense: 3 mL; Refill: 1    2. Essential " hypertension    3. Morbid obesity with BMI of 50.0-59.9, adult  -     Semaglutide,0.25 or 0.5MG/DOS, (Ozempic, 0.25 or 0.5 MG/DOSE,) 2 MG/3ML solution pen-injector; Inject 0.5 mg under the skin into the appropriate area as directed 1 (One) Time Per Week.  Dispense: 3 mL; Refill: 1    4. Sinus congestion    5. Antibiotic-induced yeast infection  -     fluconazole (Diflucan) 150 MG tablet; Take 1 tablet by mouth 1 (One) Time for 1 dose. May repeat in 3-5 days if needed.  Dispense: 2 tablet; Refill: 0      Diabetes mellitus type 2  - Patient is not monitoring her blood glucose, but she is tolerating the Ozempic well and would like to increase the dose to 0.5 to help with her obesity. Advised the patient to begin monitoring her blood glucose and inform me of the results at her next visit. Additionally, she is advised to monitor for symptoms of hypoglycemia. She is well versed on how to treat this if this does occur. Will increase Ozempic to 0.5 mg.     Hypertension  - Well-controlled. Will continue current medications.     Morbid obesity  - Patient has lost 5 pounds since her last visit. She is tolerating Ozempic very well and her dosage will be increased to 0.5 mg with glucose monitoring instructions as above for diabetes mellitus type 2.     Sinus congestion  - Recommended using a sinus irrigation with distilled water. Advised the patient to complete her current antibiotic. Do not believe an additional course of antibiotics is indicated at this time. However, she may follow up if her symptoms worsen.     Antibiotic-induced yeast infection  - Will prescribe Diflucan. Advised she is able to repeat this in 3 to 5 days if needed.     Return in about 2 months (around 7/24/2023) for chronic condition follow up.    I discussed my findings,recommendations, and plan of care was with the patient. They verbalized understanding and agreement.  Patient was encouraged to keep me informed of any acute changes, lack of improvement,  or any new concerning symptoms.     Transcribed from ambient dictation for FAMILIA Enriquez by Kayy Hayes.  05/24/23   11:17 EDT    Patient or patient representative verbalized consent to the visit recording.  I have personally performed the services described in this document as transcribed by the above individual, and it is both accurate and complete.  FAMILIA Enriquez  5/24/2023  13:00 EDT

## 2023-08-01 ENCOUNTER — OFFICE VISIT (OUTPATIENT)
Dept: INTERNAL MEDICINE | Facility: CLINIC | Age: 43
End: 2023-08-01
Payer: COMMERCIAL

## 2023-08-01 ENCOUNTER — LAB (OUTPATIENT)
Dept: INTERNAL MEDICINE | Facility: CLINIC | Age: 43
End: 2023-08-01
Payer: COMMERCIAL

## 2023-08-01 VITALS
WEIGHT: 293 LBS | RESPIRATION RATE: 18 BRPM | HEIGHT: 69 IN | BODY MASS INDEX: 43.4 KG/M2 | TEMPERATURE: 97.5 F | DIASTOLIC BLOOD PRESSURE: 84 MMHG | HEART RATE: 72 BPM | OXYGEN SATURATION: 97 % | SYSTOLIC BLOOD PRESSURE: 126 MMHG

## 2023-08-01 DIAGNOSIS — R53.83 FATIGUE, UNSPECIFIED TYPE: ICD-10-CM

## 2023-08-01 DIAGNOSIS — I10 ESSENTIAL HYPERTENSION: ICD-10-CM

## 2023-08-01 DIAGNOSIS — E66.01 MORBID OBESITY WITH BMI OF 50.0-59.9, ADULT: ICD-10-CM

## 2023-08-01 DIAGNOSIS — E11.9 TYPE 2 DIABETES MELLITUS WITHOUT COMPLICATION, WITHOUT LONG-TERM CURRENT USE OF INSULIN: Primary | ICD-10-CM

## 2023-08-01 DIAGNOSIS — E11.9 TYPE 2 DIABETES MELLITUS WITHOUT COMPLICATION, WITHOUT LONG-TERM CURRENT USE OF INSULIN: ICD-10-CM

## 2023-08-01 LAB
25(OH)D3 SERPL-MCNC: 12.4 NG/ML (ref 30–100)
ALBUMIN SERPL-MCNC: 3.8 G/DL (ref 3.5–5.2)
ALBUMIN/GLOB SERPL: 1.2 G/DL
ALP SERPL-CCNC: 68 U/L (ref 39–117)
ALT SERPL W P-5'-P-CCNC: 16 U/L (ref 1–33)
ANION GAP SERPL CALCULATED.3IONS-SCNC: 10.2 MMOL/L (ref 5–15)
AST SERPL-CCNC: 17 U/L (ref 1–32)
BILIRUB SERPL-MCNC: 0.4 MG/DL (ref 0–1.2)
BUN SERPL-MCNC: 8 MG/DL (ref 6–20)
BUN/CREAT SERPL: 8.2 (ref 7–25)
CALCIUM SPEC-SCNC: 9.5 MG/DL (ref 8.6–10.5)
CHLORIDE SERPL-SCNC: 108 MMOL/L (ref 98–107)
CHOLEST SERPL-MCNC: 127 MG/DL (ref 0–200)
CO2 SERPL-SCNC: 23.8 MMOL/L (ref 22–29)
CREAT SERPL-MCNC: 0.97 MG/DL (ref 0.57–1)
DEPRECATED RDW RBC AUTO: 47.6 FL (ref 37–54)
EGFRCR SERPLBLD CKD-EPI 2021: 75 ML/MIN/1.73
ERYTHROCYTE [DISTWIDTH] IN BLOOD BY AUTOMATED COUNT: 14.4 % (ref 12.3–15.4)
EXPIRATION DATE: NORMAL
FERRITIN SERPL-MCNC: 22.6 NG/ML (ref 13–150)
GLOBULIN UR ELPH-MCNC: 3.1 GM/DL
GLUCOSE SERPL-MCNC: 140 MG/DL (ref 65–99)
HBA1C MFR BLD: 5.9 %
HCT VFR BLD AUTO: 41.2 % (ref 34–46.6)
HDLC SERPL-MCNC: 40 MG/DL (ref 40–60)
HGB BLD-MCNC: 14.2 G/DL (ref 12–15.9)
IRON 24H UR-MRATE: 38 MCG/DL (ref 37–145)
IRON SATN MFR SERPL: 8 % (ref 20–50)
LDLC SERPL CALC-MCNC: 72 MG/DL (ref 0–100)
LDLC/HDLC SERPL: 1.8 {RATIO}
Lab: NORMAL
MCH RBC QN AUTO: 31.4 PG (ref 26.6–33)
MCHC RBC AUTO-ENTMCNC: 34.5 G/DL (ref 31.5–35.7)
MCV RBC AUTO: 91.2 FL (ref 79–97)
PLATELET # BLD AUTO: 206 10*3/MM3 (ref 140–450)
PMV BLD AUTO: 11.9 FL (ref 6–12)
POTASSIUM SERPL-SCNC: 4.4 MMOL/L (ref 3.5–5.2)
PROT SERPL-MCNC: 6.9 G/DL (ref 6–8.5)
RBC # BLD AUTO: 4.52 10*6/MM3 (ref 3.77–5.28)
SODIUM SERPL-SCNC: 142 MMOL/L (ref 136–145)
TIBC SERPL-MCNC: 450 MCG/DL (ref 298–536)
TRANSFERRIN SERPL-MCNC: 302 MG/DL (ref 200–360)
TRIGL SERPL-MCNC: 76 MG/DL (ref 0–150)
TSH SERPL DL<=0.05 MIU/L-ACNC: 1.49 UIU/ML (ref 0.27–4.2)
VIT B12 BLD-MCNC: 221 PG/ML (ref 211–946)
VLDLC SERPL-MCNC: 15 MG/DL (ref 5–40)
WBC NRBC COR # BLD: 5.76 10*3/MM3 (ref 3.4–10.8)

## 2023-08-01 PROCEDURE — 84466 ASSAY OF TRANSFERRIN: CPT | Performed by: NURSE PRACTITIONER

## 2023-08-01 PROCEDURE — 82607 VITAMIN B-12: CPT | Performed by: NURSE PRACTITIONER

## 2023-08-01 PROCEDURE — 36415 COLL VENOUS BLD VENIPUNCTURE: CPT | Performed by: NURSE PRACTITIONER

## 2023-08-01 PROCEDURE — 83540 ASSAY OF IRON: CPT | Performed by: NURSE PRACTITIONER

## 2023-08-01 PROCEDURE — 99214 OFFICE O/P EST MOD 30 MIN: CPT | Performed by: NURSE PRACTITIONER

## 2023-08-01 PROCEDURE — 80061 LIPID PANEL: CPT | Performed by: NURSE PRACTITIONER

## 2023-08-01 PROCEDURE — 82570 ASSAY OF URINE CREATININE: CPT | Performed by: NURSE PRACTITIONER

## 2023-08-01 PROCEDURE — 82306 VITAMIN D 25 HYDROXY: CPT | Performed by: NURSE PRACTITIONER

## 2023-08-01 PROCEDURE — 82728 ASSAY OF FERRITIN: CPT | Performed by: NURSE PRACTITIONER

## 2023-08-01 PROCEDURE — 80050 GENERAL HEALTH PANEL: CPT | Performed by: NURSE PRACTITIONER

## 2023-08-01 PROCEDURE — 82043 UR ALBUMIN QUANTITATIVE: CPT | Performed by: NURSE PRACTITIONER

## 2023-08-01 PROCEDURE — 83036 HEMOGLOBIN GLYCOSYLATED A1C: CPT | Performed by: NURSE PRACTITIONER

## 2023-08-01 RX ORDER — SEMAGLUTIDE 1.34 MG/ML
1 INJECTION, SOLUTION SUBCUTANEOUS WEEKLY
Qty: 3 ML | Refills: 2 | Status: SHIPPED | OUTPATIENT
Start: 2023-08-01

## 2023-08-02 LAB
ALBUMIN UR-MCNC: 5.7 MG/DL
CREAT UR-MCNC: 227.3 MG/DL
MICROALBUMIN/CREAT UR: 25.1 MG/G

## 2023-08-08 DIAGNOSIS — E55.9 VITAMIN D DEFICIENCY: Primary | ICD-10-CM

## 2023-08-08 RX ORDER — CHOLECALCIFEROL (VITAMIN D3) 1250 MCG
50000 CAPSULE ORAL
Qty: 8 CAPSULE | Refills: 0 | Status: SHIPPED | OUTPATIENT
Start: 2023-08-08 | End: 2023-09-27

## 2023-08-11 ENCOUNTER — TELEPHONE (OUTPATIENT)
Dept: INTERNAL MEDICINE | Facility: CLINIC | Age: 43
End: 2023-08-11
Payer: COMMERCIAL

## 2023-08-14 NOTE — TELEPHONE ENCOUNTER
Vitamin D was low. I will send in vitamin D3 for you to take once a week for 8 weeks.  After that, you will need to switch over to vitamin D3 2000 units every day (obtain over the counter). We should recheck vitamin D in 3 months.  Your ferritin was normal but on the low range of normal.  Please make sure you are taking a multivitamin every day with iron in it.   The rest of your labs are stable       LVM for pt to confirm she was able to see the results on her mychart. Office number provided.

## 2023-11-09 ENCOUNTER — TRANSCRIBE ORDERS (OUTPATIENT)
Dept: ADMINISTRATIVE | Facility: HOSPITAL | Age: 43
End: 2023-11-09
Payer: COMMERCIAL

## 2023-11-09 DIAGNOSIS — R92.8 ABNORMAL MAMMOGRAM: Primary | ICD-10-CM

## 2023-11-21 ENCOUNTER — OFFICE VISIT (OUTPATIENT)
Dept: INTERNAL MEDICINE | Facility: CLINIC | Age: 43
End: 2023-11-21
Payer: COMMERCIAL

## 2023-11-21 ENCOUNTER — LAB (OUTPATIENT)
Dept: INTERNAL MEDICINE | Facility: CLINIC | Age: 43
End: 2023-11-21
Payer: COMMERCIAL

## 2023-11-21 VITALS
HEIGHT: 69 IN | TEMPERATURE: 97.7 F | DIASTOLIC BLOOD PRESSURE: 84 MMHG | RESPIRATION RATE: 16 BRPM | SYSTOLIC BLOOD PRESSURE: 132 MMHG | BODY MASS INDEX: 43.4 KG/M2 | WEIGHT: 293 LBS | HEART RATE: 76 BPM | OXYGEN SATURATION: 95 %

## 2023-11-21 DIAGNOSIS — G89.29 CHRONIC PAIN OF BOTH KNEES: ICD-10-CM

## 2023-11-21 DIAGNOSIS — G56.03 BILATERAL CARPAL TUNNEL SYNDROME: ICD-10-CM

## 2023-11-21 DIAGNOSIS — M25.561 CHRONIC PAIN OF BOTH KNEES: ICD-10-CM

## 2023-11-21 DIAGNOSIS — E11.9 TYPE 2 DIABETES MELLITUS WITHOUT COMPLICATION, WITHOUT LONG-TERM CURRENT USE OF INSULIN: Primary | ICD-10-CM

## 2023-11-21 DIAGNOSIS — E55.9 VITAMIN D DEFICIENCY: ICD-10-CM

## 2023-11-21 DIAGNOSIS — I10 ESSENTIAL HYPERTENSION: ICD-10-CM

## 2023-11-21 DIAGNOSIS — R11.0 NAUSEA: ICD-10-CM

## 2023-11-21 DIAGNOSIS — M25.562 CHRONIC PAIN OF BOTH KNEES: ICD-10-CM

## 2023-11-21 DIAGNOSIS — M79.672 BILATERAL FOOT PAIN: ICD-10-CM

## 2023-11-21 DIAGNOSIS — M79.671 BILATERAL FOOT PAIN: ICD-10-CM

## 2023-11-21 DIAGNOSIS — M54.41 ACUTE MIDLINE LOW BACK PAIN WITH RIGHT-SIDED SCIATICA: ICD-10-CM

## 2023-11-21 LAB
25(OH)D3 SERPL-MCNC: 38.1 NG/ML (ref 30–100)
EXPIRATION DATE: ABNORMAL
HBA1C MFR BLD: 6.2 % (ref 4.5–5.7)
Lab: ABNORMAL

## 2023-11-21 PROCEDURE — 36415 COLL VENOUS BLD VENIPUNCTURE: CPT | Performed by: NURSE PRACTITIONER

## 2023-11-21 PROCEDURE — 82306 VITAMIN D 25 HYDROXY: CPT | Performed by: NURSE PRACTITIONER

## 2023-11-21 RX ORDER — ONDANSETRON 4 MG/1
4 TABLET, ORALLY DISINTEGRATING ORAL EVERY 8 HOURS PRN
Qty: 30 TABLET | Refills: 0 | Status: SHIPPED | OUTPATIENT
Start: 2023-11-21

## 2023-11-21 RX ORDER — NAPROXEN 500 MG/1
500 TABLET ORAL 2 TIMES DAILY PRN
Qty: 60 TABLET | Refills: 1 | Status: SHIPPED | OUTPATIENT
Start: 2023-11-21

## 2023-11-21 RX ORDER — CYCLOBENZAPRINE HCL 5 MG
5 TABLET ORAL NIGHTLY PRN
Qty: 30 TABLET | Refills: 0 | Status: SHIPPED | OUTPATIENT
Start: 2023-11-21

## 2023-11-21 NOTE — PROGRESS NOTES
Subjective   Fuad Nielsen is a 43 y.o. female.     Chief Complaint   Patient presents with    Diabetes    Vitamin D Deficiency       PCP: Mandie Tobar APRN    History of Present Illness     The patient is a 43-year-old female who is here to follow up on chronic conditions. She has diabetes and hypertension as well as vitamin D deficiency. She is requesting some refills on Flexeril, naproxen, and Zofran that she uses p.r.n. for carpal tunnel, back pain, foot pain, and nausea.    She is still having intermittent numbness in her right leg, which she thinks is weight related. She can be standing up and her leg will go numb. It used to just go numb to the knee, but now it is going all the way down. She was dancing on Friday and that is probably the most activity she has done in 3 to 6 months. It hurts and is uncomfortable to sit. If she sits low, her knee goes perpendicular. She is on her feet for 12 to 13 hours. She needs work shoes. Naproxen does help, but she takes it as needed. She has not had any x-rays of her knees. She does not think she is a surgery candidate, but she heard that corticosteroids are not good for her.    She needs a refill on her Zofran.    She was diagnosed with diabetes in 2009 and her A1c was 13. She was taking Ozempic 1 mg, but she was feeling mentally and physically exhausted. She did not have an appetite, so she stopped taking it. She would go 16 hours without eating. She has been juicing this week, celery, fruits, and protein shakes. She had a protein shake and coffee this morning. Her sleeve is still restricted for her. She can eat tons of junk food, but when it comes to real food, she is still limited. She can be at a 12-hour shift and she can eat lunch, but usually she will only eat half of that and then she may go the rest of the night without eating. When she gets off in the morning, she is hungry, so she stops at [employer] to get a meal. She feels better.    She thinks  her vitamin D is low. She has been on vitamin D for 8 weeks. It has helped, but she is still tired. She has 2000 g gummies, but she is not taking it every day. In the last week, she has taken it 3 times. She is taking vitamin D 2000 g gummies.    Supplemental Information  She was diagnosed with depression at her last visit in 08/2023.    The following portions of the patient's history were reviewed and updated as appropriate: allergies, current medications, past family history, past medical history, past social history, past surgical history and problem list.        Review of Systems   Constitutional:  Negative for fatigue, fever and unexpected weight loss.   Eyes:  Negative for blurred vision, double vision and visual disturbance.   Respiratory:  Negative for cough, shortness of breath and wheezing.    Cardiovascular:  Negative for chest pain, palpitations and leg swelling.   Gastrointestinal:  Negative for abdominal pain, constipation, diarrhea, nausea and vomiting.   Genitourinary:  Negative for difficulty urinating, frequency and urgency.   Musculoskeletal:  Negative for arthralgias and myalgias.   Skin:  Negative for color change and rash.   Neurological:  Positive for numbness (right leg numbness). Negative for dizziness, weakness and headache.   Hematological:  Negative for adenopathy. Does not bruise/bleed easily.   Psychiatric/Behavioral:  Positive for depressed mood.            Outpatient Medications Marked as Taking for the 11/21/23 encounter (Office Visit) with Mandie Tobar APRN   Medication Sig Dispense Refill    amLODIPine (NORVASC) 10 MG tablet Take 1 tablet by mouth Daily. 90 tablet 1    Cholecalciferol 25 MCG (1000 UT) chewable tablet Chew 2 each.      cyclobenzaprine (FLEXERIL) 5 MG tablet Take 1 tablet by mouth At Night As Needed for Muscle Spasms. 30 tablet 0    Etonogestrel (NEXPLANON SC) Inject  under the skin into the appropriate area as directed.      fluticasone (FLONASE) 50 MCG/ACT  nasal spray Instill 2 sprays into the nostril(s) as directed by provider Daily. 16 g 11    lisinopril (PRINIVIL,ZESTRIL) 20 MG tablet Take 1 tablet by mouth Daily. 90 tablet 1    meclizine (ANTIVERT) 25 MG tablet Take 1 tablet by mouth 3 (Three) Times a Day As Needed for Dizziness. 30 tablet 0    metoprolol tartrate (LOPRESSOR) 25 MG tablet Take 0.5 tablets by mouth Every 12 (Twelve) Hours. 90 tablet 3    naproxen (NAPROSYN) 500 MG tablet Take 1 tablet by mouth 2 (Two) Times a Day As Needed for Mild Pain. 60 tablet 1    ondansetron ODT (ZOFRAN-ODT) 4 MG disintegrating tablet Place 1 tablet on the tongue Every 8 (Eight) Hours As Needed for Nausea or Vomiting. 30 tablet 0    [DISCONTINUED] cyclobenzaprine (FLEXERIL) 5 MG tablet Take 1 tablet by mouth At Night As Needed for Muscle Spasms. 30 tablet 0    [DISCONTINUED] naproxen (NAPROSYN) 500 MG tablet Take 1 tablet by mouth 2 (Two) Times a Day As Needed for Mild Pain. 60 tablet 1     No Known Allergies        Objective   Physical Exam  Constitutional:       Appearance: Normal appearance. She is well-developed. She is morbidly obese.   HENT:      Head: Normocephalic and atraumatic.   Eyes:      General: No scleral icterus.     Conjunctiva/sclera: Conjunctivae normal.   Cardiovascular:      Rate and Rhythm: Normal rate and regular rhythm.      Heart sounds: Normal heart sounds.   Pulmonary:      Effort: Pulmonary effort is normal. No respiratory distress.      Breath sounds: Normal breath sounds.   Abdominal:      General: Bowel sounds are normal.      Palpations: Abdomen is soft.      Tenderness: There is no abdominal tenderness.   Musculoskeletal:         General: Normal range of motion.      Cervical back: Normal range of motion.      Right knee: Crepitus present. No swelling, deformity, effusion or bony tenderness. Normal range of motion. No tenderness.      Left knee: Crepitus present. No deformity, effusion or bony tenderness. Normal range of motion. No tenderness.  "     Right lower leg: No edema.      Left lower leg: No edema.   Skin:     General: Skin is warm and dry.   Neurological:      General: No focal deficit present.      Mental Status: She is alert and oriented to person, place, and time.   Psychiatric:         Attention and Perception: Attention normal.         Mood and Affect: Mood and affect normal.         Behavior: Behavior normal. Behavior is cooperative.         Thought Content: Thought content normal.         Cognition and Memory: Cognition normal.         Judgment: Judgment normal.       Vitals:    11/21/23 0855   BP: 132/84   BP Location: Right arm   Patient Position: Sitting   Cuff Size: Adult   Pulse: 76   Resp: 16   Temp: 97.7 °F (36.5 °C)   TempSrc: Infrared   SpO2: 95%   Weight: (!) 167 kg (369 lb)   Height: 175.3 cm (69\")   PainSc:   6   PainLoc: Knee  Comment: left     Body mass index is 54.49 kg/m².  Wt Readings from Last 3 Encounters:   11/21/23 (!) 167 kg (369 lb)   08/01/23 (!) 166 kg (365 lb 9.6 oz)   05/24/23 (!) 166 kg (367 lb)             Assessment & Plan   Diagnoses and all orders for this visit:    1. Type 2 diabetes mellitus without complication, without long-term current use of insulin (Primary)  -     POC Glycosylated Hemoglobin (Hb A1C)    2. Acute midline low back pain with right-sided sciatica  -     cyclobenzaprine (FLEXERIL) 5 MG tablet; Take 1 tablet by mouth At Night As Needed for Muscle Spasms.  Dispense: 30 tablet; Refill: 0    3. Bilateral carpal tunnel syndrome  -     naproxen (NAPROSYN) 500 MG tablet; Take 1 tablet by mouth 2 (Two) Times a Day As Needed for Mild Pain.  Dispense: 60 tablet; Refill: 1    4. Bilateral foot pain  -     naproxen (NAPROSYN) 500 MG tablet; Take 1 tablet by mouth 2 (Two) Times a Day As Needed for Mild Pain.  Dispense: 60 tablet; Refill: 1    5. Nausea  -     ondansetron ODT (ZOFRAN-ODT) 4 MG disintegrating tablet; Place 1 tablet on the tongue Every 8 (Eight) Hours As Needed for Nausea or Vomiting.  " Dispense: 30 tablet; Refill: 0    6. Essential hypertension    7. Chronic pain of both knees  -     naproxen (NAPROSYN) 500 MG tablet; Take 1 tablet by mouth 2 (Two) Times a Day As Needed for Mild Pain.  Dispense: 60 tablet; Refill: 1  -     XR Knee 1 or 2 View Bilateral; Future    8. Vitamin D deficiency  -     Vitamin D,25-Hydroxy; Future      1. Diabetes.  Her diabetes is well controlled without the Ozempic.  She is not interested in restarting it today. I will check her A1c in 3 months. She will work on healthy diet and routine physical activity.    2. Back pain, knee pain, carpal tunnel, and foot pain.  I refilled her naproxen. I will also put an x-ray in for both knees so we can see what is going on with that. If we want to do some physical therapy or referral to ortho after that, certainly we can do that. Encouraged weight loss    3. Hypertension.  Her hypertension looks great. She will continue her current medications.    4. Vitamin D deficiency.  I will recheck her vitamin D level. She will continue the over-the-counter supplement every day.    5. Health maintenance.  She is due for her mammogram in 03/2023. She will reschedule her diabetic eye exam. She declines COVID-19 booster.          Return in about 3 months (around 2/21/2024) for chronic condition follow up, and need to collect labs today.    I discussed my findings,recommendations, and plan of care was with the patient. They verbalized understanding and agreement.  Patient was encouraged to keep me informed of any acute changes, lack of improvement, or any new concerning symptoms.     Transcribed from ambient dictation for FAMILIA Enriquez by Zulema Pleitez, Quality .  11/21/23   10:36 EST    Patient or patient representative verbalized consent to the visit recording.  I have personally performed the services described in this document as transcribed by the above individual, and it is both accurate and  complete.  Mandie Tobar, APRN  11/22/2023  07:40 EST

## 2023-11-21 NOTE — PATIENT INSTRUCTIONS
Calorie Counting for Weight Loss  Calories are units of energy. Your body needs a certain number of calories from food to keep going throughout the day. When you eat or drink more calories than your body needs, your body stores the extra calories mostly as fat. When you eat or drink fewer calories than your body needs, your body burns fat to get the energy it needs.  Calorie counting means keeping track of how many calories you eat and drink each day. Calorie counting can be helpful if you need to lose weight. If you eat fewer calories than your body needs, you should lose weight. Ask your health care provider what a healthy weight is for you.  For calorie counting to work, you will need to eat the right number of calories each day to lose a healthy amount of weight per week. A dietitian can help you figure out how many calories you need in a day and will suggest ways to reach your calorie goal.  A healthy amount of weight to lose each week is usually 1-2 lb (0.5-0.9 kg). This usually means that your daily calorie intake should be reduced by 500-750 calories.  Eating 1,200-1,500 calories a day can help most women lose weight.  Eating 1,500-1,800 calories a day can help most men lose weight.  What do I need to know about calorie counting?  Work with your health care provider or dietitian to determine how many calories you should get each day. To meet your daily calorie goal, you will need to:  Find out how many calories are in each food that you would like to eat. Try to do this before you eat.  Decide how much of the food you plan to eat.  Keep a food log. Do this by writing down what you ate and how many calories it had.  To successfully lose weight, it is important to balance calorie counting with a healthy lifestyle that includes regular activity.  Where do I find calorie information?    The number of calories in a food can be found on a Nutrition Facts label. If a food does not have a Nutrition Facts label, try  to look up the calories online or ask your dietitian for help.  Remember that calories are listed per serving. If you choose to have more than one serving of a food, you will have to multiply the calories per serving by the number of servings you plan to eat. For example, the label on a package of bread might say that a serving size is 1 slice and that there are 90 calories in a serving. If you eat 1 slice, you will have eaten 90 calories. If you eat 2 slices, you will have eaten 180 calories.  How do I keep a food log?  After each time that you eat, record the following in your food log as soon as possible:  What you ate. Be sure to include toppings, sauces, and other extras on the food.  How much you ate. This can be measured in cups, ounces, or number of items.  How many calories were in each food and drink.  The total number of calories in the food you ate.  Keep your food log near you, such as in a pocket-sized notebook or on an maged or website on your mobile phone. Some programs will calculate calories for you and show you how many calories you have left to meet your daily goal.  What are some portion-control tips?  Know how many calories are in a serving. This will help you know how many servings you can have of a certain food.  Use a measuring cup to measure serving sizes. You could also try weighing out portions on a kitchen scale. With time, you will be able to estimate serving sizes for some foods.  Take time to put servings of different foods on your favorite plates or in your favorite bowls and cups so you know what a serving looks like.  Try not to eat straight from a food's packaging, such as from a bag or box. Eating straight from the package makes it hard to see how much you are eating and can lead to overeating. Put the amount you would like to eat in a cup or on a plate to make sure you are eating the right portion.  Use smaller plates, glasses, and bowls for smaller portions and to prevent  overeating.  Try not to multitask. For example, avoid watching TV or using your computer while eating. If it is time to eat, sit down at a table and enjoy your food. This will help you recognize when you are full. It will also help you be more mindful of what and how much you are eating.  What are tips for following this plan?  Reading food labels  Check the calorie count compared with the serving size. The serving size may be smaller than what you are used to eating.  Check the source of the calories. Try to choose foods that are high in protein, fiber, and vitamins, and low in saturated fat, trans fat, and sodium.  Shopping  Read nutrition labels while you shop. This will help you make healthy decisions about which foods to buy.  Pay attention to nutrition labels for low-fat or fat-free foods. These foods sometimes have the same number of calories or more calories than the full-fat versions. They also often have added sugar, starch, or salt to make up for flavor that was removed with the fat.  Make a grocery list of lower-calorie foods and stick to it.  Cooking  Try to cook your favorite foods in a healthier way. For example, try baking instead of frying.  Use low-fat dairy products.  Meal planning  Use more fruits and vegetables. One-half of your plate should be fruits and vegetables.  Include lean proteins, such as chicken, turkey, and fish.  Lifestyle  Each week, aim to do one of the followin minutes of moderate exercise, such as walking.  75 minutes of vigorous exercise, such as running.  General information  Know how many calories are in the foods you eat most often. This will help you calculate calorie counts faster.  Find a way of tracking calories that works for you. Get creative. Try different apps or programs if writing down calories does not work for you.  What foods should I eat?    Eat nutritious foods. It is better to have a nutritious, high-calorie food, such as an avocado, than a food with  few nutrients, such as a bag of potato chips.  Use your calories on foods and drinks that will fill you up and will not leave you hungry soon after eating.  Examples of foods that fill you up are nuts and nut butters, vegetables, lean proteins, and high-fiber foods such as whole grains. High-fiber foods are foods with more than 5 g of fiber per serving.  Pay attention to calories in drinks. Low-calorie drinks include water and unsweetened drinks.  The items listed above may not be a complete list of foods and beverages you can eat. Contact a dietitian for more information.  What foods should I limit?  Limit foods or drinks that are not good sources of vitamins, minerals, or protein or that are high in unhealthy fats. These include:  Candy.  Other sweets.  Sodas, specialty coffee drinks, alcohol, and juice.  The items listed above may not be a complete list of foods and beverages you should avoid. Contact a dietitian for more information.  How do I count calories when eating out?  Pay attention to portions. Often, portions are much larger when eating out. Try these tips to keep portions smaller:  Consider sharing a meal instead of getting your own.  If you get your own meal, eat only half of it. Before you start eating, ask for a container and put half of your meal into it.  When available, consider ordering smaller portions from the menu instead of full portions.  Pay attention to your food and drink choices. Knowing the way food is cooked and what is included with the meal can help you eat fewer calories.  If calories are listed on the menu, choose the lower-calorie options.  Choose dishes that include vegetables, fruits, whole grains, low-fat dairy products, and lean proteins.  Choose items that are boiled, broiled, grilled, or steamed. Avoid items that are buttered, battered, fried, or served with cream sauce. Items labeled as crispy are usually fried, unless stated otherwise.  Choose water, low-fat milk,  unsweetened iced tea, or other drinks without added sugar. If you want an alcoholic beverage, choose a lower-calorie option, such as a glass of wine or light beer.  Ask for dressings, sauces, and syrups on the side. These are usually high in calories, so you should limit the amount you eat.  If you want a salad, choose a garden salad and ask for grilled meats. Avoid extra toppings such as samuels, cheese, or fried items. Ask for the dressing on the side, or ask for olive oil and vinegar or lemon to use as dressing.  Estimate how many servings of a food you are given. Knowing serving sizes will help you be aware of how much food you are eating at restaurants.  Where to find more information  Centers for Disease Control and Prevention: www.cdc.gov  [place] Department of Agriculture: myplate.gov  Summary  Calorie counting means keeping track of how many calories you eat and drink each day. If you eat fewer calories than your body needs, you should lose weight.  A healthy amount of weight to lose per week is usually 1-2 lb (0.5-0.9 kg). This usually means reducing your daily calorie intake by 500-750 calories.  The number of calories in a food can be found on a Nutrition Facts label. If a food does not have a Nutrition Facts label, try to look up the calories online or ask your dietitian for help.  Use smaller plates, glasses, and bowls for smaller portions and to prevent overeating.  Use your calories on foods and drinks that will fill you up and not leave you hungry shortly after a meal.  This information is not intended to replace advice given to you by your health care provider. Make sure you discuss any questions you have with your health care provider.  Document Revised: 01/28/2021 Document Reviewed: 01/28/2021  Elsevier Patient Education © 2023 [employer]  Exercising to Lose Weight  Getting regular exercise is important for everyone. It is especially important if you are overweight. Being overweight increases your  risk of heart disease, stroke, diabetes, high blood pressure, and several types of cancer. Exercising, and reducing the calories you consume, can help you lose weight and improve fitness and health.  Exercise can be moderate or vigorous intensity. To lose weight, most people need to do a certain amount of moderate or vigorous-intensity exercise each week.  How can exercise affect me?  You lose weight when you exercise enough to burn more calories than you eat. Exercise also reduces body fat and builds muscle. The more muscle you have, the more calories you burn. Exercise also:  Improves mood.  Reduces stress and tension.  Improves your overall fitness, flexibility, and endurance.  Increases bone strength.  Moderate-intensity exercise    Moderate-intensity exercise is any activity that gets you moving enough to burn at least three times more energy (calories) than if you were sitting.  Examples of moderate exercise include:  Walking a mile in 15 minutes.  Doing light yard work.  Biking at an easy pace.  Most people should get at least 150 minutes of moderate-intensity exercise a week to maintain their body weight.  Vigorous-intensity exercise  Vigorous-intensity exercise is any activity that gets you moving enough to burn at least six times more calories than if you were sitting. When you exercise at this intensity, you should be working hard enough that you are not able to carry on a conversation.  Examples of vigorous exercise include:  Running.  Playing a team sport, such as football, basketball, and soccer.  Jumping rope.  Most people should get at least 75 minutes a week of vigorous exercise to maintain their body weight.  What actions can I take to lose weight?  The amount of exercise you need to lose weight depends on:  Your age.  The type of exercise.  Any health conditions you have.  Your overall physical ability.  Talk to your health care provider about how much exercise you need and what types of  activities are safe for you.  Nutrition    Make changes to your diet as told by your health care provider or diet and nutrition specialist (dietitian). This may include:  Eating fewer calories.  Eating more protein.  Eating less unhealthy fats.  Eating a diet that includes fresh fruits and vegetables, whole grains, low-fat dairy products, and lean protein.  Avoiding foods with added fat, salt, and sugar.  Drink plenty of water while you exercise to prevent dehydration or heat stroke.  Activity  Choose an activity that you enjoy and set realistic goals. Your health care provider can help you make an exercise plan that works for you.  Exercise at a moderate or vigorous intensity most days of the week.  The intensity of exercise may vary from person to person. You can tell how intense a workout is for you by paying attention to your breathing and heartbeat. Most people will notice their breathing and heartbeat get faster with more intense exercise.  Do resistance training twice each week, such as:  Push-ups.  Sit-ups.  Lifting weights.  Using resistance bands.  Getting short amounts of exercise can be just as helpful as long, structured periods of exercise. If you have trouble finding time to exercise, try doing these things as part of your daily routine:  Get up, stretch, and walk around every 30 minutes throughout the day.  Go for a walk during your lunch break.  Park your car farther away from your destination.  If you take public transportation, get off one stop early and walk the rest of the way.  Make phone calls while standing up and walking around.  Take the stairs instead of elevators or escalators.  Wear comfortable clothes and shoes with good support.  Do not exercise so much that you hurt yourself, feel dizzy, or get very short of breath.  Where to find more information  [place] Department of Health and Human Services: www.hhs.gov  Centers for Disease Control and Prevention: www.cdc.gov  Contact a health care  provider:  Before starting a new exercise program.  If you have questions or concerns about your weight.  If you have a medical problem that keeps you from exercising.  Get help right away if:  You have any of the following while exercising:  Injury.  Dizziness.  Difficulty breathing or shortness of breath that does not go away when you stop exercising.  Chest pain.  Rapid heartbeat.  These symptoms may represent a serious problem that is an emergency. Do not wait to see if the symptoms will go away. Get medical help right away. Call your local emergency services (911 in the [place] Do not drive yourself to the hospital.  Summary  Getting regular exercise is especially important if you are overweight.  Being overweight increases your risk of heart disease, stroke, diabetes, high blood pressure, and several types of cancer.  Losing weight happens when you burn more calories than you eat.  Reducing the amount of calories you eat, and getting regular moderate or vigorous exercise each week, helps you lose weight.  This information is not intended to replace advice given to you by your health care provider. Make sure you discuss any questions you have with your health care provider.  Document Revised: 02/13/2022 Document Reviewed: 02/13/2022  Elsevier Patient Education © 2023 [employer]

## 2023-12-01 ENCOUNTER — TELEPHONE (OUTPATIENT)
Dept: INTERNAL MEDICINE | Facility: CLINIC | Age: 43
End: 2023-12-01
Payer: COMMERCIAL

## 2023-12-01 NOTE — TELEPHONE ENCOUNTER
L/M HUB TO RELAY   Your labs are in acceptable ranges/are stable. Please continue your current treatment plan and/or medications.       [employer] message also sent

## 2024-03-12 ENCOUNTER — HOSPITAL ENCOUNTER (OUTPATIENT)
Dept: ULTRASOUND IMAGING | Facility: HOSPITAL | Age: 44
Discharge: HOME | End: 2024-03-12
Payer: COMMERCIAL

## 2024-03-12 ENCOUNTER — HOSPITAL ENCOUNTER (OUTPATIENT)
Dept: MAMMOGRAPHY | Facility: HOSPITAL | Age: 44
Discharge: HOME | End: 2024-03-12
Payer: COMMERCIAL

## 2024-03-12 ENCOUNTER — TRANSCRIBE ORDERS (OUTPATIENT)
Dept: ADMINISTRATIVE | Facility: HOSPITAL | Age: 44
End: 2024-03-12
Payer: COMMERCIAL

## 2024-03-12 DIAGNOSIS — R92.8 ABNORMAL MAMMOGRAM: Primary | ICD-10-CM

## 2024-03-12 DIAGNOSIS — R92.8 ABNORMAL MAMMOGRAM: ICD-10-CM

## 2024-03-12 PROCEDURE — G0279 TOMOSYNTHESIS, MAMMO: HCPCS

## 2024-03-12 PROCEDURE — 76642 ULTRASOUND BREAST LIMITED: CPT

## 2024-03-12 PROCEDURE — 76642 ULTRASOUND BREAST LIMITED: CPT | Performed by: RADIOLOGY

## 2024-03-12 PROCEDURE — 77066 DX MAMMO INCL CAD BI: CPT | Performed by: RADIOLOGY

## 2024-03-12 PROCEDURE — 77066 DX MAMMO INCL CAD BI: CPT

## 2024-03-12 PROCEDURE — 77062 BREAST TOMOSYNTHESIS BI: CPT | Performed by: RADIOLOGY

## 2024-04-05 ENCOUNTER — HOSPITAL ENCOUNTER (OUTPATIENT)
Dept: MAMMOGRAPHY | Facility: HOSPITAL | Age: 44
Discharge: HOME | End: 2024-04-05
Payer: COMMERCIAL

## 2024-04-05 DIAGNOSIS — R92.8 ABNORMAL MAMMOGRAM: ICD-10-CM

## 2024-04-05 PROCEDURE — 25010000002 LIDOCAINE 1 % SOLUTION: Performed by: RADIOLOGY

## 2024-04-05 PROCEDURE — C1819 TISSUE LOCALIZATION-EXCISION: HCPCS

## 2024-04-05 RX ORDER — LIDOCAINE HYDROCHLORIDE AND EPINEPHRINE 10; 10 MG/ML; UG/ML
10 INJECTION, SOLUTION INFILTRATION; PERINEURAL ONCE
Status: COMPLETED | OUTPATIENT
Start: 2024-04-05 | End: 2024-04-05

## 2024-04-05 RX ORDER — LIDOCAINE HYDROCHLORIDE 10 MG/ML
5 INJECTION, SOLUTION INFILTRATION; PERINEURAL ONCE
Status: COMPLETED | OUTPATIENT
Start: 2024-04-05 | End: 2024-04-05

## 2024-04-05 RX ADMIN — LIDOCAINE HYDROCHLORIDE,EPINEPHRINE BITARTRATE 10 ML: 10; .01 INJECTION, SOLUTION INFILTRATION; PERINEURAL at 11:14

## 2024-04-05 RX ADMIN — Medication 1 ML: at 11:12

## 2024-04-05 NOTE — PROGRESS NOTES
Alert and orientated.Denies discomfort, no active bleeding, steri-strips not visualized, gauze dressing intact. Cold packs given. Questions answered, support given. Verbalizes and demonstrates understanding of post-care instructions, written copy given.

## 2024-04-08 ENCOUNTER — TELEPHONE (OUTPATIENT)
Dept: MAMMOGRAPHY | Facility: HOSPITAL | Age: 44
End: 2024-04-08
Payer: COMMERCIAL

## 2024-04-08 NOTE — TELEPHONE ENCOUNTER
Attempted to notify patient of pathology and recommendations, left message requesting patient return call.

## 2024-04-10 ENCOUNTER — TELEPHONE (OUTPATIENT)
Dept: MAMMOGRAPHY | Facility: HOSPITAL | Age: 44
End: 2024-04-10
Payer: COMMERCIAL

## 2024-04-10 NOTE — TELEPHONE ENCOUNTER
Patient notified of pathology results and recommendation. Verbalizes understanding. Denies discomfort. Denies signs and symptoms of infection. Questions answered, support given, verbalized understanding. Patient transferred to [facility] scheduling to schedule recommended follow-up.

## 2024-04-18 ENCOUNTER — OFFICE VISIT (OUTPATIENT)
Dept: ENDOCRINOLOGY | Facility: CLINIC | Age: 44
End: 2024-04-18
Payer: COMMERCIAL

## 2024-04-18 VITALS
OXYGEN SATURATION: 97 % | WEIGHT: 293 LBS | DIASTOLIC BLOOD PRESSURE: 84 MMHG | SYSTOLIC BLOOD PRESSURE: 130 MMHG | HEIGHT: 69 IN | BODY MASS INDEX: 43.4 KG/M2 | HEART RATE: 65 BPM

## 2024-04-18 DIAGNOSIS — R73.03 PREDIABETES: ICD-10-CM

## 2024-04-18 DIAGNOSIS — E04.2 NONTOXIC MULTINODULAR GOITER: Primary | ICD-10-CM

## 2024-04-18 DIAGNOSIS — E66.01 CLASS 3 SEVERE OBESITY DUE TO EXCESS CALORIES WITHOUT SERIOUS COMORBIDITY WITH BODY MASS INDEX (BMI) OF 50.0 TO 59.9 IN ADULT: ICD-10-CM

## 2024-04-18 NOTE — PROGRESS NOTES
"Chief Complaint   Patient presents with    Goiter        HPI   Fuad Nielsen is a 44 y.o. female had concerns including Goiter.      Was last seen 8/2022. Due for thyroid ultrasound.     PCP following prediabetes. Will see her later this month. Tried ozempic in the past. Had a lot going on at the time but suppressed her appetite to where she wasn't eating hardly anything.   Doesn't eat regularly and when she does eat it isn't \"the healthiest\". Eats out often. Just her and her son (about to start ).   Not able to exercise consistently.     Works night shift. Sleep is inadequate.       The following portions of the patient's history were reviewed and updated as appropriate: allergies, current medications, past family history, past medical history, past social history, past surgical history, and problem list.    Review of Systems   Constitutional: Negative.    Endocrine: Negative.         /84   Pulse 65   Ht 175.3 cm (69\")   Wt (!) 169 kg (372 lb)   SpO2 97%   BMI 54.93 kg/m²      Physical Exam  Vitals reviewed.   Constitutional:       Appearance: Normal appearance. She is obese.      Comments: Body mass index is 54.93 kg/m².   Cardiovascular:      Rate and Rhythm: Normal rate.   Pulmonary:      Effort: Pulmonary effort is normal.   Neurological:      General: No focal deficit present.      Mental Status: She is alert. Mental status is at baseline.   Psychiatric:         Mood and Affect: Mood normal.         Behavior: Behavior normal.              LABS AND IMAGING   CMP  Lab Results   Component Value Date    GLUCOSE 140 (H) 08/01/2023    BUN 8 08/01/2023    CREATININE 0.97 08/01/2023    EGFRIFAFRI 90 01/11/2022    BCR 8.2 08/01/2023    K 4.4 08/01/2023    CO2 23.8 08/01/2023    CALCIUM 9.5 08/01/2023    ALBUMIN 3.8 08/01/2023    AST 17 08/01/2023    ALT 16 08/01/2023        CBC w/DIFF   Lab Results   Component Value Date    WBC 5.76 08/01/2023    RBC 4.52 08/01/2023    HGB 14.2 " 08/01/2023    HCT 41.2 08/01/2023    MCV 91.2 08/01/2023    MCH 31.4 08/01/2023    MCHC 34.5 08/01/2023    RDW 14.4 08/01/2023    RDWSD 47.6 08/01/2023    MPV 11.9 08/01/2023     08/01/2023    NEUTRORELPCT 28.4 (L) 01/02/2021    LYMPHORELPCT 61.7 (H) 01/02/2021    MONORELPCT 6.6 01/02/2021    EOSRELPCT 2.5 01/02/2021    BASORELPCT 0.6 01/02/2021    AUTOIGPER 0.2 01/02/2021    NEUTROABS 1.86 01/02/2021    LYMPHSABS 4.02 (H) 01/02/2021    MONOSABS 0.43 01/02/2021    EOSABS 0.16 01/02/2021    BASOSABS 0.04 01/02/2021    AUTOIGNUM 0.01 01/02/2021    NRBC 0.0 01/02/2021       TSH  Lab Results   Component Value Date    TSH 1.490 08/01/2023    TSH 3.340 03/23/2023    TSH 1.460 01/11/2022     T4  Lab Results   Component Value Date    FREET4 1.14 03/29/2021     TPO  Lab Results   Component Value Date    THYROIDAB 9 03/29/2021     Hemoglobin A1C   Date Value Ref Range Status   11/21/2023 6.2 (A) 4.5 - 5.7 % Final          Thyroid Ultrasound 08/25/22     Indication: Multinodular goiter  Comparison Imaging: Ultrasound 4/23/2021  Clinical History:  Large left nodule, history of FNA left nodule July 2021, was benign     Real time high resolution imaging of the thyroid gland was performed in transverse and longitudinal planes. Previous imaging reports were reviewed if available and compared to the current to assess stability.      Lobes:  The right lobe measured 4.5 cm L x 1.8 cm AP x 2.3 cm in TV dimension.    The isthmus measured 0.7 cm in thickness.    The left thyroid lobe measured 6.4 cm L x 3.7 cm AP x 4.6 cm in TV dimension.     Thyroid gland is heterogeneous and contains multiple nodules.     Nodule 1   located in the right lower lobe and measures 2.9 x 2.0 x 1.8 cm (L X AP X TV).  This nodule is mixed solid and cystic, heterogeneous, hypoechoic with well-defined margins, and Grade I vascularity on Color Flow Doppler.  It has no artifacts.  Previously measured 3.0 x 1.9 x 1.8 cm.     Nodule 2  is located in the left  mid lobe and measures 6.5 x 3.2 x 4.3 cm (L X AP X TV).  This nodule is mixed solid and cystic, heterogeneous, hypoechoic with well-defined margins, and Grade I vascularity on Color Flow Doppler.  It has no artifacts.  This nodule is similar in size to appearance to last year when it was biopsied and found to be benign.     No pathologic lymph nodes were seen.     Impression:  Stable multinodular goiter.  Large dominant left lobe nodule measuring 6.5 cm, unchanged since last year when it was biopsied and found to be benign.  Stable right lobe nodule versus pseudonodule.     Recommendation:  Repeat ultrasound 1 year       Thyroid Ultrasound 04/18/24    Indication: Multinodular goiter  Comparison Imaging: Ultrasound 2021, 2022  Clinical History:  Large left nodule, history of FNA left nodule July 2021, benign    Real time high resolution imaging of the thyroid gland was performed in transverse and longitudinal planes. Previous imaging reports were reviewed if available and compared to the current to assess stability.     Lobes:  The right lobe measured 6.8 cm L x 2.5 cm AP x 2.3 cm in TV dimension.    The isthmus measured 0.6 cm in thickness.    The left thyroid lobe measured 7.2 cm L x 4.0 cm AP x 4.4 cm in TV dimension.    Thyroid gland is heterogeneous and contains multiple nodules.    Nodule 1   located in the left mid lobe and measures 6.5 x 3.3 x 4.8 cm (L X AP X TV).  This nodule is mixed solid and cystic, heterogeneous, hypoechoic with well-defined margins.  It has no artifacts.  This nodule has increased slightly in transverse dimension, otherwise overall unchanged in size and appearance.      Nodule 2  is located in the right mid lobe and measures 2.7 x 2.0 x 1.7 cm (L X AP X TV).  This nodule is mixed solid and cystic, heterogeneous, hypoechoic with well-defined margins.  It has no artifacts.  This nodule has not changed in size or appearance from prior ultrasounds.    The large left lobe nodule seems to be  mildly compressing the jugular, however the right jugular is also easily compressible with ultrasound probe.    No pathologic lymph nodes were seen.    Impression:  Stable bilateral nodules.  The left lobe nodule encompasses nearly the entirety of the left lobe.  No significant change in size and appearance from prior ultrasounds.  This nodule was biopsied in 2021 and found to be benign.  Slight compression of the jugular vein from the nodule however the right jugular is also easily compressible with ultrasound probe.    Recommendation:  Repeat ultrasound in 6 months.        Assessment and Plan    Diagnoses and all orders for this visit:    1. Nontoxic multinodular goiter (Primary)  See detailed ultrasound report as above.  Stable bilateral nodules.  The left lobe nodule encompasses nearly the entirety of the left lobe.  No significant change in size and appearance from prior ultrasounds.  This nodule was biopsied in 2021 and found to be benign.  Slight compression of the jugular vein from the nodule however the right jugular is also easily compressible with ultrasound probe.  Repeat ultrasound in 6 months.  -     US Thyroid    2. Prediabetes  PCP monitoring.  Weight loss and dietary modifications are needed.  Last A1c was 6.2.      3. Class 3 severe obesity due to excess calories without serious comorbidity with body mass index (BMI) of 50.0 to 59.9 in adult  BMI 55.  Weight loss is necessary for overall health and diabetes management.  Discussed some dietary modifications to improve glucose control and weight loss.  Decrease fast food, increase lean proteins, meal prep, decrease carbs.       Return in about 6 months (around 10/18/2024) for next scheduled follow up, with thyroid ultrasound ** 30 min appt - HYDRATE BEFORE VISIT. The patient was instructed to contact the clinic with any interval questions or concerns.    Electronically signed by: Jessica Aden DO   Endocrinologist    Please note that portions of  this note were completed with a voice recognition program.

## 2024-07-01 ENCOUNTER — OFFICE VISIT (OUTPATIENT)
Dept: INTERNAL MEDICINE | Facility: CLINIC | Age: 44
End: 2024-07-01
Payer: COMMERCIAL

## 2024-07-01 ENCOUNTER — LAB (OUTPATIENT)
Dept: INTERNAL MEDICINE | Facility: CLINIC | Age: 44
End: 2024-07-01
Payer: COMMERCIAL

## 2024-07-01 VITALS
DIASTOLIC BLOOD PRESSURE: 90 MMHG | TEMPERATURE: 97.5 F | SYSTOLIC BLOOD PRESSURE: 148 MMHG | HEART RATE: 88 BPM | OXYGEN SATURATION: 99 % | RESPIRATION RATE: 18 BRPM | BODY MASS INDEX: 43.4 KG/M2 | WEIGHT: 293 LBS | HEIGHT: 69 IN

## 2024-07-01 DIAGNOSIS — E11.9 TYPE 2 DIABETES MELLITUS WITHOUT COMPLICATION, WITHOUT LONG-TERM CURRENT USE OF INSULIN: ICD-10-CM

## 2024-07-01 DIAGNOSIS — R53.83 FATIGUE, UNSPECIFIED TYPE: ICD-10-CM

## 2024-07-01 DIAGNOSIS — D25.9 UTERINE LEIOMYOMA, UNSPECIFIED LOCATION: Chronic | ICD-10-CM

## 2024-07-01 DIAGNOSIS — N92.1 MENORRHAGIA WITH IRREGULAR CYCLE: ICD-10-CM

## 2024-07-01 DIAGNOSIS — I10 ESSENTIAL HYPERTENSION: ICD-10-CM

## 2024-07-01 DIAGNOSIS — Z97.5 NEXPLANON IN PLACE: Primary | ICD-10-CM

## 2024-07-01 LAB
DEPRECATED RDW RBC AUTO: 42.5 FL (ref 37–54)
ERYTHROCYTE [DISTWIDTH] IN BLOOD BY AUTOMATED COUNT: 13.9 % (ref 12.3–15.4)
HBA1C MFR BLD: 6.5 % (ref 4.8–5.6)
HCT VFR BLD AUTO: 35.7 % (ref 34–46.6)
HGB BLD-MCNC: 11.4 G/DL (ref 12–15.9)
MCH RBC QN AUTO: 27.1 PG (ref 26.6–33)
MCHC RBC AUTO-ENTMCNC: 31.9 G/DL (ref 31.5–35.7)
MCV RBC AUTO: 85 FL (ref 79–97)
PLATELET # BLD AUTO: 275 10*3/MM3 (ref 140–450)
PMV BLD AUTO: 10.8 FL (ref 6–12)
RBC # BLD AUTO: 4.2 10*6/MM3 (ref 3.77–5.28)
WBC NRBC COR # BLD AUTO: 7.6 10*3/MM3 (ref 3.4–10.8)

## 2024-07-01 PROCEDURE — 36415 COLL VENOUS BLD VENIPUNCTURE: CPT | Performed by: NURSE PRACTITIONER

## 2024-07-01 PROCEDURE — 99214 OFFICE O/P EST MOD 30 MIN: CPT | Performed by: NURSE PRACTITIONER

## 2024-07-01 PROCEDURE — 83036 HEMOGLOBIN GLYCOSYLATED A1C: CPT | Performed by: NURSE PRACTITIONER

## 2024-07-01 PROCEDURE — 80050 GENERAL HEALTH PANEL: CPT | Performed by: NURSE PRACTITIONER

## 2024-07-01 PROCEDURE — 82306 VITAMIN D 25 HYDROXY: CPT | Performed by: NURSE PRACTITIONER

## 2024-07-01 PROCEDURE — 82607 VITAMIN B-12: CPT | Performed by: NURSE PRACTITIONER

## 2024-07-01 RX ORDER — LISINOPRIL 20 MG/1
20 TABLET ORAL DAILY
Qty: 90 TABLET | Refills: 1 | Status: SHIPPED | OUTPATIENT
Start: 2024-07-01

## 2024-07-01 RX ORDER — AMLODIPINE BESYLATE 10 MG/1
10 TABLET ORAL DAILY
Qty: 90 TABLET | Refills: 1 | Status: SHIPPED | OUTPATIENT
Start: 2024-07-01

## 2024-07-01 NOTE — PROGRESS NOTES
Subjective   Fuad Nielsen is a 44 y.o. female.     Chief Complaint   Patient presents with    Fatigue     Ongoing for several months     Headache     Pt believes it could be tension headaches as she feels them in her temples and neck and shoulder area     referral     Pt is needing a referral to OBGYN to have Nexplanon removed and PAP updated        PCP: Mandie Tobar APRN    History of Present Illness /Review of systems    History of Present Illness  The patient is a 44-year-old female who is here to discuss headache and fatigue. Fatigue has been ongoing for several months. She feels as though her headaches could be tension headaches as she is feeling them in bilateral temples and her neck and shoulder area. She is also in need of gynecology referral as she has Nexplanon in place and it is nearly time to have this removed and she would like to see gynecology for this as well as updating her Pap smear.    The patient has been experiencing persistent fatigue for several months, which she suspects may be related to her vitamin D levels. She recalls an episode in 09/2023 where she experienced severe depression, fatigue, and insufficient sleep and vit d deficiency was the cause. This feels similar.  Working night shifts, her sleep quality remains suboptimal. Her weight today is 362 pounds, a decrease from her previous weight of 372 pounds  04/18/2023. She expresses a desire to have her vitamin D levels checked.    The patient has not been adhering to her prescribed antihypertensive medication. She reports no adverse effects from her medication. She is just not in the habit of taking it.   Patient denies any  dizziness, visual disturbances, palpitations, chest pain, dyspnea, TIA or CVA symptoms, leg pain/claudication symptoms, and edema.     The patient has been passing clots due to a history of multiple uterine fibroids. She is due to have her Nexplanon removed in 10/2023 and is seeking a referral to a  "gynecologist.    She has multinodular goiter and did see her endocrinologist, Dr. Fried 4/18/2024 her ultrasound at that visit was stable and thyroid labs were as well.  They plan on having her follow-up at 6 months to reevaluate with another ultrasound  Results  Laboratory Studies  Vitamin D level was 12.4 seven months ago. A1c was 6.2 in November 23.    Imaging  Ultrasound on 4/18/2024 showed stable thyroid nodules.    The following portions of the patient's history were reviewed and updated as appropriate: allergies, current medications, past family history, past medical history, past social history, past surgical history and problem list.              Outpatient Medications Marked as Taking for the 7/1/24 encounter (Office Visit) with Mandie Tobar APRN   Medication Sig Dispense Refill    amLODIPine (NORVASC) 10 MG tablet Take 1 tablet by mouth Daily. 90 tablet 1    Etonogestrel (NEXPLANON SC) Inject  under the skin into the appropriate area as directed.      lisinopril (PRINIVIL,ZESTRIL) 20 MG tablet Take 1 tablet by mouth Daily. 90 tablet 1    metoprolol tartrate (LOPRESSOR) 25 MG tablet Take 0.5 tablets by mouth Every 12 (Twelve) Hours. 90 tablet 3    [DISCONTINUED] amLODIPine (NORVASC) 10 MG tablet Take 1 tablet by mouth Daily. 90 tablet 1    [DISCONTINUED] lisinopril (PRINIVIL,ZESTRIL) 20 MG tablet Take 1 tablet by mouth Daily. 90 tablet 1     No Known Allergies        Objective     Vitals:    07/01/24 1546   BP: 148/90   BP Location: Right arm   Patient Position: Sitting   Cuff Size: Large Adult   Pulse: 88   Resp: 18   Temp: 97.5 °F (36.4 °C)   TempSrc: Infrared   SpO2: 99%   Weight: (!) 165 kg (362 lb 12.8 oz)   Height: 175.3 cm (69\")     Body mass index is 53.58 kg/m².  Wt Readings from Last 3 Encounters:   07/01/24 (!) 165 kg (362 lb 12.8 oz)   04/18/24 (!) 169 kg (372 lb)   11/21/23 (!) 167 kg (369 lb)     Physical Exam  Constitutional:       Appearance: Normal appearance. She is well-developed. " She is morbidly obese.   HENT:      Head: Normocephalic and atraumatic.   Eyes:      General: No scleral icterus.     Conjunctiva/sclera: Conjunctivae normal.   Neck:      Thyroid: Thyromegaly (goiter, no discrete mass palpated) present.   Cardiovascular:      Rate and Rhythm: Normal rate and regular rhythm.      Heart sounds: Normal heart sounds.   Pulmonary:      Effort: Pulmonary effort is normal. No respiratory distress.      Breath sounds: Normal breath sounds.   Abdominal:      General: Bowel sounds are normal.      Palpations: Abdomen is soft.      Tenderness: There is no abdominal tenderness.   Musculoskeletal:         General: Normal range of motion.      Cervical back: Normal range of motion.      Right lower leg: No edema.      Left lower leg: No edema.   Skin:     General: Skin is warm and dry.   Neurological:      General: No focal deficit present.      Mental Status: She is alert and oriented to person, place, and time.   Psychiatric:         Attention and Perception: Attention normal.         Mood and Affect: Mood and affect normal.         Behavior: Behavior normal. Behavior is cooperative.         Thought Content: Thought content normal.         Cognition and Memory: Cognition normal.         Judgment: Judgment normal.                 Assessment & Plan   Diagnoses and all orders for this visit:    1. Nexplanon in place (Primary)  -     Ambulatory Referral to Gynecology    2. Fatigue, unspecified type  -     CBC (No Diff); Future  -     Comprehensive Metabolic Panel; Future  -     TSH Rfx On Abnormal To Free T4; Future  -     Vitamin D 25 hydroxy; Future  -     Vitamin B12; Future  -     Hemoglobin A1c; Future    3. Type 2 diabetes mellitus without complication, without long-term current use of insulin  -     Hemoglobin A1c; Future  -     lisinopril (PRINIVIL,ZESTRIL) 20 MG tablet; Take 1 tablet by mouth Daily.  Dispense: 90 tablet; Refill: 1    4. Essential hypertension  -     lisinopril  (PRINIVIL,ZESTRIL) 20 MG tablet; Take 1 tablet by mouth Daily.  Dispense: 90 tablet; Refill: 1  -     amLODIPine (NORVASC) 10 MG tablet; Take 1 tablet by mouth Daily.  Dispense: 90 tablet; Refill: 1    5. Uterine leiomyoma, unspecified location  -     Ambulatory Referral to Gynecology    6. Menorrhagia with irregular cycle  -     Ambulatory Referral to Gynecology        Assessment & Plan  1. Headache and fatigue.  The patient's headaches could be attributed to her blood pressure. A CBC, CMP, vitamin D, and thyroid tests will be ordered.    2. Hypertension.  The patient's amlodipine and lisinopril prescriptions will be refilled. Encouraged to start taking routinely as I suspect headaches are related to uncontrolled hypertension     3.  Type 2 diabetes  A1c was stable at last check over 6 months ago we will recheck this with labs today.    4.  Nexplanon/menorrhagia/fibroid  A referral to a gynecologist will be made for FU on fibroids, Nexplanon removal, and update of her Pap smear.    Follow-up  A follow-up visit is scheduled for 3 months from now.            Return in about 3 months (around 10/1/2024) for chronic condition follow up, and need to collect labs today.    I discussed my findings,recommendations, and plan of care was with the patient. They verbalized understanding and agreement.  Patient was encouraged to keep me informed of any acute changes, lack of improvement, or any new concerning symptoms.     Patient or patient representative verbalized consent for the use of Ambient Listening during the visit with  FAMILIA Enriquez for chart documentation. 7/1/2024  16:36 EDT

## 2024-07-02 DIAGNOSIS — E55.9 VITAMIN D DEFICIENCY: Primary | ICD-10-CM

## 2024-07-02 LAB
25(OH)D3 SERPL-MCNC: 19.7 NG/ML (ref 30–100)
ALBUMIN SERPL-MCNC: 3.9 G/DL (ref 3.5–5.2)
ALBUMIN/GLOB SERPL: 1.2 G/DL
ALP SERPL-CCNC: 74 U/L (ref 39–117)
ALT SERPL W P-5'-P-CCNC: 14 U/L (ref 1–33)
ANION GAP SERPL CALCULATED.3IONS-SCNC: 9.4 MMOL/L (ref 5–15)
AST SERPL-CCNC: 16 U/L (ref 1–32)
BILIRUB SERPL-MCNC: 0.3 MG/DL (ref 0–1.2)
BUN SERPL-MCNC: 6 MG/DL (ref 6–20)
BUN/CREAT SERPL: 5.9 (ref 7–25)
CALCIUM SPEC-SCNC: 9.6 MG/DL (ref 8.6–10.5)
CHLORIDE SERPL-SCNC: 105 MMOL/L (ref 98–107)
CO2 SERPL-SCNC: 23.6 MMOL/L (ref 22–29)
CREAT SERPL-MCNC: 1.02 MG/DL (ref 0.57–1)
EGFRCR SERPLBLD CKD-EPI 2021: 70.1 ML/MIN/1.73
GLOBULIN UR ELPH-MCNC: 3.2 GM/DL
GLUCOSE SERPL-MCNC: 103 MG/DL (ref 65–99)
POTASSIUM SERPL-SCNC: 4 MMOL/L (ref 3.5–5.2)
PROT SERPL-MCNC: 7.1 G/DL (ref 6–8.5)
SODIUM SERPL-SCNC: 138 MMOL/L (ref 136–145)
TSH SERPL DL<=0.05 MIU/L-ACNC: 2.12 UIU/ML (ref 0.27–4.2)
VIT B12 BLD-MCNC: 289 PG/ML (ref 211–946)

## 2024-07-02 RX ORDER — CHOLECALCIFEROL (VITAMIN D3) 1250 MCG
50000 CAPSULE ORAL
Qty: 8 CAPSULE | Refills: 0 | Status: SHIPPED | OUTPATIENT
Start: 2024-07-02 | End: 2024-08-27

## 2024-09-09 NOTE — PROGRESS NOTES
Fuad Nielsen presents to [facility] for     Chief Complaint  Chief Complaint   Patient presents with   • discuss labs     Pt would like to discuss labs, and needs her A1C checked   • Diabetes     Follow up Last A1C 6.0 (9/12/2023)       PCP:  Mandie Tobar APRN    Subjective        History of Present Illness     Type 2 DIABETES.  Her last hemoglobin A1c was  6.0% in 9/2022  Medications: none  Patient does not check glucose at home.   Current diet: mix of healthy and unhealthy, low-carb high-protein  current exercise: joined a gym, struggles with working out routinely  Last diabetic eye exam : at  [facility]-6/4/2021  Microalbumin  normal 6/2021-encouraged to schedule  Ace: yes-lisinopril  Denies headaches, dizziness, visual disturbances, chest pain, dyspnea, polyuria, polyphagia, paraesthesias, and hypoglycemic episodes.      HTN- chronic, on lisinopril. well controlled overall but BP is a little  more elevated office today.    Denies headaches, dizziness, visual disturbances, palpitations chest pain, dyspnea, TIA or CVA symptoms, leg pain/claudication symptoms, and edema.    PAF-noted after Covid in 12/2020.  Felt to be more related to Covid.  She did see Dr. Harris and was anticoagulated for a brief period time but he felt as though that she no longer needed anticoagulation and felt as though aspirin and beta-blocker were sufficient.  She has done well with this.   Rarely has palpitations.  Echo in 1/2020 looked okay.      ALINA. Is now on CPAP. She has been using  nasal prong instead of full mask.  Follows with sleep medicine     Multinodular nontoxic goiter- seeing endo .  Now follows with Dr. Aden.  She had a benign FNA in 7/2021.    She last saw Dr. Fried 8/25/2022.  Multinodular goiter was stable.  She plans on following up in 8/2023 with a repeat ultrasound at that time.      mammogram required additional imaging.   Breast biopsy X2 was  See other encounter  Pain medicine ordered, patient notified    Dr Dillon confirmed that lab work was completed in hospital so we can cancel active orders.    Patient will see GI on Wednesday    benign she did go back to see Dr. PERRY who was going to do a possible lymph node resection.  However, she tells me that he wanted to repeat an ultrasound first.  Area was unchanged at that time so she will repeat her mammogram in 6 months and at that time may need resection with Dr. PERRY.    does go back next month to see Dr. PERRY for possible surgery for lymph node resection       not interested anymore.   She did have a gallbladder ultrasound 9/8/2021 through them which showed some gallstones and fatty liver but was otherwise unremarkable  She saw Dr. Goddard who rec SIPS procedure. She is not sure that she wants to do this   wants to do her 6 months of weight loss visits here, see where she is at, and then may be proceed with surgery  Current diet: high protein, low carb  Exercise: not a lot, struggles with trying to get enough physical activity, going to start 8 week program with her gym soon  Works as a nurse at [employer]  .   Bilateral carpal tunnel.  Fairly well controlled.  Uses naproxen for    Having some low back pain.  Reports that her right leg goes numb when she is standing for a while doing dishes.  No injuries or traumas.  No bowel or bladder involvement.  Low back feels tight        Health Maintenance:   Health Maintenance   Topic Date Due   • Pneumococcal Vaccine 0-64 (2 - PCV) 10/01/2013   • ANNUAL PHYSICAL  Never done   • DIABETIC FOOT EXAM  09/27/2021   • COVID-19 Vaccine (3 - Booster for Moderna series) 10/24/2021   • DIABETIC EYE EXAM  06/04/2022   • URINE MICROALBUMIN  06/29/2022   • INFLUENZA VACCINE  08/01/2022   • PAP SMEAR  03/09/2023   • HEMOGLOBIN A1C  03/12/2023   • TDAP/TD VACCINES (4 - Td or Tdap) 07/16/2028   • HEPATITIS C SCREENING  Completed   • Hepatitis B  Completed     Due for pneumonia vaccination-we will update today        Review of Systems   Constitutional: Positive for unexpected weight gain. Negative for appetite change, diaphoresis, fatigue, fever and unexpected weight loss.    Eyes: Negative for blurred vision, double vision, pain and visual disturbance.   Respiratory: Negative for cough, chest tightness, shortness of breath and wheezing.    Cardiovascular: Negative for chest pain, palpitations and leg swelling.   Gastrointestinal: Negative for abdominal distention, abdominal pain, constipation, diarrhea, nausea and vomiting.   Endocrine: Negative for polydipsia, polyphagia and polyuria.   Genitourinary: Negative for difficulty urinating, frequency and urgency.   Musculoskeletal: Positive for arthralgias, back pain and myalgias. Negative for gait problem.   Skin: Negative for color change and rash.   Neurological: Positive for numbness. Negative for dizziness, facial asymmetry, weakness, light-headedness, headache and confusion.   Hematological: Negative for adenopathy. Does not bruise/bleed easily.   Psychiatric/Behavioral: Negative for sleep disturbance.         No Known Allergies  Current Outpatient Medications on File Prior to Visit   Medication Sig Dispense Refill   • Blood Glucose Monitoring Suppl (FREESTYLE FREEDOM LITE) w/Device kit Use as directed to test blood sugar once daily 1 each 0   • Etonogestrel (NEXPLANON SC) Inject  under the skin into the appropriate area as directed.     • fluticasone (Flonase) 50 MCG/ACT nasal spray 2 sprays into the nostril(s) as directed by provider Daily. (Patient taking differently: 2 sprays into the nostril(s) as directed by provider Daily As Needed.) 16 g 11   • Lancets misc Use as directed to test blood sugar once Daily. 100 each 11   • levocetirizine (Xyzal) 5 MG tablet Take 1 tablet by mouth Every Evening. 30 tablet 11   • metoprolol tartrate (LOPRESSOR) 25 MG tablet Take 0.5 tablets by mouth Every 12 (Twelve) Hours. 90 tablet 3   • olopatadine (Patanol) 0.1 % ophthalmic solution Administer 1 drop to both eyes 2 (Two) Times a Day. (Patient taking differently: Administer 1 drop to both eyes 2 (Two) Times a Day. Pt is using PRN) 5 mL 11  "    No current facility-administered medications on file prior to visit.         The following portions of the patient's history were reviewed and updated as appropriate: allergies, current medications, past family history, past medical history, past social history, past surgical history and problem list and are available for review within electronic record    Objective     Result Review :     The following data was reviewed by: FAMILIA Enriquez on 03/24/2023:  Common labs    Common Labs 9/12/22 3/23/23 3/23/23 3/23/23 3/24/23     0826 0826 0826    Glucose  101 (A) 92     BUN  8 8     Creatinine  0.84 1.05 (A)     Sodium  139 140     Potassium  4.0 4.1     Chloride  104 103     Calcium  9.5 9.9     Albumin   4.1     Total Bilirubin   0.2     Alkaline Phosphatase   70     AST (SGOT)   18     ALT (SGPT)   16     Total Cholesterol    147    Triglycerides    112    HDL Cholesterol    51    LDL Cholesterol     76    Hemoglobin A1C 6.0    6.1   (A) Abnormal value                       Vital Signs:   /98 (BP Location: Right arm, Patient Position: Sitting, Cuff Size: Adult)   Pulse 78   Temp 97.1 °F (36.2 °C) (Infrared)   Resp 18   Ht 175.3 cm (69\")   Wt (!) 169 kg (372 lb 3.2 oz)   SpO2 96%   BMI 54.96 kg/m²         Physical Exam  Vitals and nursing note reviewed.   Constitutional:       General: She is not in acute distress.     Appearance: Normal appearance. She is well-developed. She is morbidly obese. She is not ill-appearing or diaphoretic.   HENT:      Head: Normocephalic and atraumatic.   Eyes:      General: No scleral icterus.     Conjunctiva/sclera: Conjunctivae normal.   Neck:      Vascular: No JVD.   Cardiovascular:      Rate and Rhythm: Normal rate and regular rhythm.      Chest Wall: PMI is not displaced. No thrill.      Heart sounds: Normal heart sounds. No murmur heard.  Pulmonary:      Effort: Pulmonary effort is normal. No accessory muscle usage or respiratory distress.      Breath " sounds: Normal breath sounds.   Chest:      Chest wall: No tenderness.   Abdominal:      General: Bowel sounds are normal. There is no distension.      Palpations: Abdomen is soft.      Tenderness: There is no abdominal tenderness.   Musculoskeletal:         General: Normal range of motion.      Cervical back: Normal range of motion.      Lumbar back: Normal. No swelling, edema, deformity, signs of trauma, lacerations, spasms, tenderness or bony tenderness. Normal range of motion. Negative right straight leg raise test and negative left straight leg raise test. No scoliosis.      Right lower leg: No edema.      Left lower leg: No edema.   Skin:     General: Skin is warm and dry.      Coloration: Skin is not ashen, jaundiced, mottled or pale.      Findings: No erythema.   Neurological:      General: No focal deficit present.      Mental Status: She is alert and oriented to person, place, and time.      Cranial Nerves: No cranial nerve deficit.      Sensory: No sensory deficit.      Deep Tendon Reflexes: Reflexes are normal and symmetric.   Psychiatric:         Attention and Perception: Attention normal.         Mood and Affect: Mood and affect normal.         Behavior: Behavior normal. Behavior is cooperative.         Thought Content: Thought content normal.         Cognition and Memory: Cognition normal.         Judgment: Judgment normal.                 Assessment and Plan      Diagnoses and all orders for this visit:    1. Type 2 diabetes mellitus without complication, without long-term current use of insulin (Primary)  -     POC Glycosylated Hemoglobin (Hb A1C)  -     Semaglutide,0.25 or 0.5MG/DOS, (Ozempic, 0.25 or 0.5 MG/DOSE,) 2 MG/3ML solution pen-injector; Inject 0.25 mg under the skin into the appropriate area as directed 1 (One) Time Per Week.  Dispense: 3 mL; Refill: 1  -     lisinopril (PRINIVIL,ZESTRIL) 20 MG tablet; Take 1 tablet by mouth Daily.  Dispense: 90 tablet; Refill: 1  -     Microalbumin /  Creatinine Urine Ratio - Urine, Clean Catch; Future  -     Comprehensive Metabolic Panel; Future  -     Lipid Panel; Future  -     TSH Rfx On Abnormal To Free T4; Future  A1c of 6.1 today.  Prefer to keep her less than 6%  As we can do so without hypoglycemic episodes.  She would benefit from weight loss as well.  We will go ahead and get her started on some Ozempic.  Adverse effects discussed  2. Morbid obesity with BMI of 50.0-59.9, adult  -     Semaglutide,0.25 or 0.5MG/DOS, (Ozempic, 0.25 or 0.5 MG/DOSE,) 2 MG/3ML solution pen-injector; Inject 0.25 mg under the skin into the appropriate area as directed 1 (One) Time Per Week.  Dispense: 3 mL; Refill: 1  -     Microalbumin / Creatinine Urine Ratio - Urine, Clean Catch; Future  -     Comprehensive Metabolic Panel; Future  -     Lipid Panel; Future  -     TSH Rfx On Abnormal To Free T4; Future  Class 3 Severe Obesity (BMI >=40). Obesity-related health conditions include the following: hypertension, diabetes mellitus and dyslipidemias. Obesity is worsening. BMI is is above average; BMI management plan is completed. We discussed low calorie, low carb based diet program, portion control and increasing exercise.  Ozempic  3. Essential hypertension  -     lisinopril (PRINIVIL,ZESTRIL) 20 MG tablet; Take 1 tablet by mouth Daily.  Dispense: 90 tablet; Refill: 1  -     amLODIPine (NORVASC) 10 MG tablet; Take 1 tablet by mouth Daily.  Dispense: 90 tablet; Refill: 1  -     Microalbumin / Creatinine Urine Ratio - Urine, Clean Catch; Future  -     Comprehensive Metabolic Panel; Future  -     Lipid Panel; Future  -     TSH Rfx On Abnormal To Free T4; Future  Uncontrolled.  We will continue amlodipine and increase her lisinopril to 20 mg.  Low-sodium diet.  Encouraged weight loss  4. Bilateral carpal tunnel syndrome  -     naproxen (NAPROSYN) 500 MG tablet; Take 1 tablet by mouth 2 (Two) Times a Day As Needed for Mild Pain.  Dispense: 60 tablet; Refill: 1  Naproxen as needed.   Use sparingly  5. Acute midline low back pain with right-sided sciatica  -     cyclobenzaprine (FLEXERIL) 5 MG tablet; Take 1 tablet by mouth At Night As Needed for Muscle Spasms.  Dispense: 30 tablet; Refill: 0  Cyclobenzaprine as needed.  Adverse effects discussed.  Follow-up if symptoms fail to improve  6. Bilateral foot pain  -     naproxen (NAPROSYN) 500 MG tablet; Take 1 tablet by mouth 2 (Two) Times a Day As Needed for Mild Pain.  Dispense: 60 tablet; Refill: 1    Other orders  -     Pneumococcal Conjugate Vaccine 20-Valent (PCV20)  Update pneumonia vaccination today      Follow Up     Patient was given instructions and counseling regarding her condition or for health maintenance advice. Please see specific information pulled into the AVS if appropriate.   Any new medication's adverse effects have been discussed in detail with patient  Patient was encouraged to keep me informed of any acute changes, lack of improvement, or any new concerning symptoms.    Return in about 1 month (around 4/24/2023) for Recheck.          Dictated Utilizing Dragon Dictation   Please note that portions of this note were completed with a voice recognition program.   Part of this note may be an electronic transcription/translation of spoken language to printed text using the Dragon Dictation System.

## 2024-09-19 DIAGNOSIS — E55.9 VITAMIN D DEFICIENCY: ICD-10-CM

## 2024-09-19 RX ORDER — CHOLECALCIFEROL (VITAMIN D3) 1250 MCG
50000 CAPSULE ORAL
Qty: 8 CAPSULE | Refills: 0 | OUTPATIENT
Start: 2024-09-19 | End: 2024-11-08

## 2024-10-03 ENCOUNTER — TELEPHONE (OUTPATIENT)
Dept: INTERNAL MEDICINE | Facility: CLINIC | Age: 44
End: 2024-10-03
Payer: COMMERCIAL

## 2024-10-03 NOTE — TELEPHONE ENCOUNTER
----- Message from Mandie Tobar sent at 10/2/2024  1:15 PM EDT -----  Please remind her that she is due for her 6-month follow-up diagnostic mammogram.  I do not see that she has this scheduled.  Give her scheduling information if needed thanks

## 2024-10-22 ENCOUNTER — OFFICE VISIT (OUTPATIENT)
Dept: ENDOCRINOLOGY | Facility: CLINIC | Age: 44
End: 2024-10-22
Payer: COMMERCIAL

## 2024-10-22 VITALS
HEART RATE: 76 BPM | DIASTOLIC BLOOD PRESSURE: 90 MMHG | BODY MASS INDEX: 43.4 KG/M2 | SYSTOLIC BLOOD PRESSURE: 138 MMHG | WEIGHT: 293 LBS | OXYGEN SATURATION: 98 % | HEIGHT: 69 IN

## 2024-10-22 DIAGNOSIS — E66.813 CLASS 3 SEVERE OBESITY DUE TO EXCESS CALORIES WITHOUT SERIOUS COMORBIDITY WITH BODY MASS INDEX (BMI) OF 50.0 TO 59.9 IN ADULT: ICD-10-CM

## 2024-10-22 DIAGNOSIS — E66.01 CLASS 3 SEVERE OBESITY DUE TO EXCESS CALORIES WITHOUT SERIOUS COMORBIDITY WITH BODY MASS INDEX (BMI) OF 50.0 TO 59.9 IN ADULT: ICD-10-CM

## 2024-10-22 DIAGNOSIS — E11.9 TYPE 2 DIABETES MELLITUS WITHOUT COMPLICATION, WITHOUT LONG-TERM CURRENT USE OF INSULIN: ICD-10-CM

## 2024-10-22 DIAGNOSIS — E04.2 NONTOXIC MULTINODULAR GOITER: Primary | ICD-10-CM

## 2024-10-22 LAB
EXPIRATION DATE: ABNORMAL
EXPIRATION DATE: NORMAL
GLUCOSE BLDC GLUCOMTR-MCNC: 98 MG/DL (ref 70–130)
HBA1C MFR BLD: 6.5 % (ref 4.5–5.7)
Lab: ABNORMAL
Lab: NORMAL

## 2024-10-22 PROCEDURE — 82947 ASSAY GLUCOSE BLOOD QUANT: CPT | Performed by: INTERNAL MEDICINE

## 2024-10-22 PROCEDURE — 99214 OFFICE O/P EST MOD 30 MIN: CPT | Performed by: INTERNAL MEDICINE

## 2024-10-22 PROCEDURE — 76536 US EXAM OF HEAD AND NECK: CPT | Performed by: INTERNAL MEDICINE

## 2024-10-22 PROCEDURE — 83036 HEMOGLOBIN GLYCOSYLATED A1C: CPT | Performed by: INTERNAL MEDICINE

## 2024-10-22 NOTE — PROGRESS NOTES
"Chief Complaint   Patient presents with    Goiter     Nontoxic multinodular        HPI   Fuad Nielsen is a 44 y.o. female had concerns including Goiter (Nontoxic multinodular).     No changes since her last visit.  PCP checked A1c in July and it was elevated at 6.5.  Patient was unaware of the result value.  Has tried to modify her diet but her son is a very picky eater and due to busy schedule/work she often picks up takeout.  She usually drinks sparkling water but this morning had a few sips of regular Pepsi. A1c is 6.5 again today.     Due for repeat ultrasound.  Has no compressive symptoms from the goiter.       The following portions of the patient's history were reviewed and updated as appropriate: allergies, current medications, past family history, past medical history, past social history, past surgical history, and problem list.    Review of Systems   Constitutional: Negative.    Endocrine: Negative.         /90 (BP Location: Left arm, Patient Position: Sitting, Cuff Size: Adult)   Pulse 76   Ht 175.3 cm (69.02\")   Wt (!) 164 kg (362 lb 9.6 oz)   SpO2 98%   BMI 53.52 kg/m²      Physical Exam  Vitals reviewed.   Constitutional:       Appearance: Normal appearance. She is obese.      Comments: Body mass index is 53.52 kg/m².   Cardiovascular:      Rate and Rhythm: Normal rate.   Pulmonary:      Effort: Pulmonary effort is normal.   Neurological:      General: No focal deficit present.      Mental Status: She is alert. Mental status is at baseline.   Psychiatric:         Mood and Affect: Mood normal.         Behavior: Behavior normal.              LABS AND IMAGING   CMP  Lab Results   Component Value Date    GLUCOSE 103 (H) 07/01/2024    BUN 6 07/01/2024    CREATININE 1.02 (H) 07/01/2024    EGFRIFAFRI 90 01/11/2022    BCR 5.9 (L) 07/01/2024    K 4.0 07/01/2024    CO2 23.6 07/01/2024    CALCIUM 9.6 07/01/2024    ALBUMIN 3.9 07/01/2024    AST 16 07/01/2024    ALT 14 07/01/2024        CBC " w/DIFF   Lab Results   Component Value Date    WBC 7.60 07/01/2024    RBC 4.20 07/01/2024    HGB 11.4 (L) 07/01/2024    HCT 35.7 07/01/2024    MCV 85.0 07/01/2024    MCH 27.1 07/01/2024    MCHC 31.9 07/01/2024    RDW 13.9 07/01/2024    RDWSD 42.5 07/01/2024    MPV 10.8 07/01/2024     07/01/2024    NEUTRORELPCT 28.4 (L) 01/02/2021    LYMPHORELPCT 61.7 (H) 01/02/2021    MONORELPCT 6.6 01/02/2021    EOSRELPCT 2.5 01/02/2021    BASORELPCT 0.6 01/02/2021    AUTOIGPER 0.2 01/02/2021    NEUTROABS 1.86 01/02/2021    LYMPHSABS 4.02 (H) 01/02/2021    MONOSABS 0.43 01/02/2021    EOSABS 0.16 01/02/2021    BASOSABS 0.04 01/02/2021    AUTOIGNUM 0.01 01/02/2021    NRBC 0.0 01/02/2021     TSH  Lab Results   Component Value Date    TSH 2.120 07/01/2024    TSH 1.490 08/01/2023    TSH 3.340 03/23/2023     T4  Lab Results   Component Value Date    FREET4 1.14 03/29/2021     TPO  Lab Results   Component Value Date    THYROIDAB 9 03/29/2021     Hemoglobin A1C   Date Value Ref Range Status   10/22/2024 6.5 (A) 4.5 - 5.7 % Final   07/01/2024 6.50 (H) 4.80 - 5.60 % Final   11/21/2023 6.2 (A) 4.5 - 5.7 % Final       Thyroid Ultrasound 04/18/24     Indication: Multinodular goiter  Comparison Imaging: Ultrasound 2021, 2022  Clinical History:  Large left nodule, history of FNA left nodule July 2021, benign     Real time high resolution imaging of the thyroid gland was performed in transverse and longitudinal planes. Previous imaging reports were reviewed if available and compared to the current to assess stability.      Lobes:  The right lobe measured 6.8 cm L x 2.5 cm AP x 2.3 cm in TV dimension.    The isthmus measured 0.6 cm in thickness.    The left thyroid lobe measured 7.2 cm L x 4.0 cm AP x 4.4 cm in TV dimension.     Thyroid gland is heterogeneous and contains multiple nodules.     Nodule 1   located in the left mid lobe and measures 6.5 x 3.3 x 4.8 cm (L X AP X TV).  This nodule is mixed solid and cystic, heterogeneous,  hypoechoic with well-defined margins.  It has no artifacts.  This nodule has increased slightly in transverse dimension, otherwise overall unchanged in size and appearance.       Nodule 2  is located in the right mid lobe and measures 2.7 x 2.0 x 1.7 cm (L X AP X TV).  This nodule is mixed solid and cystic, heterogeneous, hypoechoic with well-defined margins.  It has no artifacts.  This nodule has not changed in size or appearance from prior ultrasounds.     The large left lobe nodule seems to be mildly compressing the jugular, however the right jugular is also easily compressible with ultrasound probe.     No pathologic lymph nodes were seen.     Impression:  Stable bilateral nodules.  The left lobe nodule encompasses nearly the entirety of the left lobe.  No significant change in size and appearance from prior ultrasounds.  This nodule was biopsied in 2021 and found to be benign.  Slight compression of the jugular vein from the nodule however the right jugular is also easily compressible with ultrasound probe.     Recommendation:  Repeat ultrasound in 6 months.         Thyroid Ultrasound 10/22/24    Indication: Multinodular goiter  Comparison Imaging: Ultrasound 2021, 2022, 4/2024  Clinical History:  Large left nodule, history of FNA left nodule July 2021, benign       Real time high resolution imaging of the thyroid gland was performed in transverse and longitudinal planes. Previous imaging reports were reviewed if available and compared to the current to assess stability.     Lobes:  The right lobe measured 4.4 cm L x 2.0 cm AP x 2.1 cm in TV dimension.    The isthmus measured 0.7 cm in thickness.    The left thyroid lobe measured 5.9 cm L x 3.6 cm AP x 3.7 cm in TV dimension.    Thyroid gland is enlarged, heterogeneous and contains multiple nodules.    Nodule 1   located in the left mid lobe and measures 6.8 x 4.2 x 4.8 cm (L X AP X TV).  This nodule is complex solid, heterogeneous, isoechoic with well-defined  margins, and Grade II vascularity on Color Flow Doppler.  It has no artifacts.  Similar size and appearance to prior ultrasounds.    Nodule 2  is located in the right mid lobe and measures 2.9 x 2.1 x 2.8 cm (L X AP X TV).  This nodule is complex solid, heterogeneous, hypoechoic with indistinct margins, and Grade II vascularity on Color Flow Doppler.  It has no artifacts.  Similar size and appearance to prior ultrasounds.  Indistinct margins more consistent with heterogeneity of the gland/pseudonodule.    No pathologic lymph nodes were seen.    Impression:  Diffusely enlarged and heterogeneous gland with dominant nodule in the left lobe, largely unchanged in size and appearance from the prior ultrasounds.    Recommendation:  Repeat ultrasound in 1 year.      Assessment and Plan    Diagnoses and all orders for this visit:    1. Nontoxic multinodular goiter (Primary)  See detailed ultrasound report as above.  Stable bilateral nodules/pseduonodules.  The left lobe nodule encompasses nearly the entirety of the left lobe.  No significant change in size and appearance from prior ultrasounds.  This nodule was biopsied in 2021 and found to be benign.  Slight compression of the jugular vein from the nodule however the right jugular is also easily compressible with ultrasound probe.  Repeat ultrasound in 12 months.  Thyroid function is normal. She isn't interested in surgery and has no compressive symptoms at this time. If this changes, call back.  -     US Thyroid    2. Type 2 diabetes mellitus without complication, without long-term current use of insulin  Uncontrolled with hyperglycemia, A1c gradually trending up, now to 6.5 - consistent with DM2.  Dietary modifications with a goal for weight loss are necessary.  She is hoping to avoid prescription therapy.  Recalls taking Ozempic in the past and she had significant appetite suppression.  History of gastric sleeve 10+ years ago.  Referral to nutritionist was placed.   Follow-up with PCP for monitoring.  -     POC Glycosylated Hemoglobin (Hb A1C)  -     POC Glucose, Blood  -     Ambulatory Referral to Diabetic Education    3. Class 3 severe obesity due to excess calories without serious comorbidity with body mass index (BMI) of 50.0 to 59.9 in adult  BMI stable at 53.5.  Weight loss is necessary for overall health and diabetes management.       Return in about 1 year (around 10/22/2025) for with thyroid ultrasound ** 30 min appt, next scheduled follow up. The patient was instructed to contact the clinic with any interval questions or concerns.    Electronically signed by: Jessica Aden DO   Endocrinologist    Please note that portions of this note were completed with a voice recognition program.

## 2025-01-24 ENCOUNTER — PATIENT ROUNDING (BHMG ONLY) (OUTPATIENT)
Dept: URGENT CARE | Facility: CLINIC | Age: 45
End: 2025-01-24
Payer: COMMERCIAL

## 2025-06-03 ENCOUNTER — OFFICE VISIT (OUTPATIENT)
Dept: INTERNAL MEDICINE | Facility: CLINIC | Age: 45
End: 2025-06-03
Payer: COMMERCIAL

## 2025-06-03 VITALS
WEIGHT: 293 LBS | HEIGHT: 69 IN | DIASTOLIC BLOOD PRESSURE: 98 MMHG | OXYGEN SATURATION: 100 % | BODY MASS INDEX: 43.4 KG/M2 | RESPIRATION RATE: 18 BRPM | SYSTOLIC BLOOD PRESSURE: 168 MMHG | TEMPERATURE: 97.7 F | HEART RATE: 80 BPM

## 2025-06-03 DIAGNOSIS — M79.89 PAIN AND SWELLING OF LOWER LEG, RIGHT: ICD-10-CM

## 2025-06-03 DIAGNOSIS — E11.9 TYPE 2 DIABETES MELLITUS WITHOUT COMPLICATION, WITHOUT LONG-TERM CURRENT USE OF INSULIN: Primary | ICD-10-CM

## 2025-06-03 DIAGNOSIS — R45.86 MOOD SWINGS: ICD-10-CM

## 2025-06-03 DIAGNOSIS — R53.83 FATIGUE, UNSPECIFIED TYPE: ICD-10-CM

## 2025-06-03 DIAGNOSIS — Z12.31 BREAST CANCER SCREENING BY MAMMOGRAM: ICD-10-CM

## 2025-06-03 DIAGNOSIS — I87.8 VENOUS STASIS: ICD-10-CM

## 2025-06-03 DIAGNOSIS — I48.0 PAROXYSMAL ATRIAL FIBRILLATION: Chronic | ICD-10-CM

## 2025-06-03 DIAGNOSIS — M79.661 PAIN AND SWELLING OF LOWER LEG, RIGHT: ICD-10-CM

## 2025-06-03 DIAGNOSIS — F32.A MILD DEPRESSION: ICD-10-CM

## 2025-06-03 DIAGNOSIS — Z97.5 NEXPLANON IN PLACE: ICD-10-CM

## 2025-06-03 DIAGNOSIS — I10 ESSENTIAL HYPERTENSION: ICD-10-CM

## 2025-06-03 DIAGNOSIS — N92.6 IRREGULAR MENSES: ICD-10-CM

## 2025-06-03 DIAGNOSIS — E55.9 VITAMIN D DEFICIENCY: ICD-10-CM

## 2025-06-03 LAB
EXPIRATION DATE: ABNORMAL
HBA1C MFR BLD: 5.9 % (ref 4.5–5.7)
Lab: ABNORMAL

## 2025-06-03 RX ORDER — LISINOPRIL 20 MG/1
20 TABLET ORAL DAILY
Qty: 90 TABLET | Refills: 1 | Status: SHIPPED | OUTPATIENT
Start: 2025-06-03

## 2025-06-03 RX ORDER — ESCITALOPRAM OXALATE 10 MG/1
10 TABLET ORAL DAILY
Qty: 30 TABLET | Refills: 1 | Status: SHIPPED | OUTPATIENT
Start: 2025-06-03

## 2025-06-03 RX ORDER — TRIAMCINOLONE ACETONIDE 1 MG/G
1 CREAM TOPICAL 2 TIMES DAILY
Qty: 80 G | Refills: 1 | Status: SHIPPED | OUTPATIENT
Start: 2025-06-03

## 2025-06-03 RX ORDER — METOPROLOL TARTRATE 25 MG/1
12.5 TABLET, FILM COATED ORAL EVERY 12 HOURS SCHEDULED
Qty: 90 TABLET | Refills: 1 | Status: SHIPPED | OUTPATIENT
Start: 2025-06-03

## 2025-06-03 RX ORDER — AMLODIPINE BESYLATE 10 MG/1
10 TABLET ORAL DAILY
Qty: 90 TABLET | Refills: 1 | Status: SHIPPED | OUTPATIENT
Start: 2025-06-03

## 2025-06-03 NOTE — PROGRESS NOTES
Subjective   Fuad Nielsen is a 45 y.o. female.     Chief Complaint   Patient presents with    Diabetes    Hypertension       PCP: Mandie Tobar APRN    History of Present Illness     History of Present Illness      Fuad is a 45-year-old female with diabetes, hypertension, A-fib history, venous stasis, anxiety depression and mood swings, irregular menses with iron deficiency anemia, and vitamin D deficiency.  She reports she has been off her meds for about a month or more.  Just got busy and was not taking them.  She has noticed that she has more fatigue.  She also complains about her legs itching and had some swelling in her right popliteal region.  Chronic SAM- has had infusions in the past   Eating a lot of ice.   Hair thinning.   Having periods still. Not regular but never have been. Last menses in May  Having some sweating  Losing weightr- not trying Not eating a lot    eating better now, more salads. Less reeces than she used to.      A1c 5.9% today.        Depression   Was on prozac in the past.   Does not feel as though it helped her like the way she felt.  She is interested in trying something different.  She does admit to some mild depression but no HI/SI endorsed.     Results      Lab Results   Component Value Date    WBC 6.14 06/04/2025    HGB 9.7 (L) 06/04/2025    HCT 34.3 06/04/2025    MCV 79.0 06/04/2025     06/04/2025     Lab Results   Component Value Date    GLUCOSE 108 (H) 06/04/2025    BUN 7.0 06/04/2025    CREATININE 0.92 06/04/2025    EGFR 78.9 06/04/2025    BCR 7.6 06/04/2025    K 4.0 06/04/2025    CO2 23.1 06/04/2025    CALCIUM 9.7 06/04/2025    ALBUMIN 3.9 06/04/2025    BILITOT 0.4 06/04/2025    AST 19 06/04/2025    ALT 12 06/04/2025     Lab Results   Component Value Date    CHOL 128 06/04/2025    TRIG 86 06/04/2025    HDL 48 06/04/2025    LDL 63 06/04/2025     Lab Results   Component Value Date    TSH 1.830 06/04/2025     A1C Last 3 Results          7/1/2024    16:10  "10/22/2024    09:31 6/3/2025    17:00   HGBA1C Last 3 Results   Hemoglobin A1C 6.50  6.5  5.9        The following portions of the patient's history were reviewed and updated as appropriate: allergies, current medications, past family history, past medical history, past social history, past surgical history and problem list.              Outpatient Medications Marked as Taking for the 6/3/25 encounter (Office Visit) with Mandie Tobar APRN   Medication Sig Dispense Refill    amLODIPine (NORVASC) 10 MG tablet Take 1 tablet by mouth Daily. 90 tablet 1    lisinopril (PRINIVIL,ZESTRIL) 20 MG tablet Take 1 tablet by mouth Daily. 90 tablet 1    metoprolol tartrate (LOPRESSOR) 25 MG tablet Take 0.5 tablets by mouth Every 12 (Twelve) Hours. 90 tablet 1     No Known Allergies        Objective     Vitals:    06/03/25 1615   BP: 168/98   BP Location: Right arm   Patient Position: Sitting   Cuff Size: Adult   Pulse: 80   Resp: 18   Temp: 97.7 °F (36.5 °C)   TempSrc: Infrared   SpO2: 100%   Weight: (!) 154 kg (338 lb 9.6 oz)   Height: 175.3 cm (69.02\")     Body mass index is 49.98 kg/m².  Wt Readings from Last 3 Encounters:   06/04/25 (!) 154 kg (339 lb 8.1 oz)   06/03/25 (!) 154 kg (338 lb 9.6 oz)   01/23/25 (!) 163 kg (360 lb)       Physical Exam  Vitals and nursing note reviewed.   Constitutional:       General: She is not in acute distress.     Appearance: Normal appearance. She is well-developed. She is morbidly obese. She is not ill-appearing or diaphoretic.   HENT:      Head: Normocephalic and atraumatic.   Eyes:      General: No scleral icterus.     Conjunctiva/sclera: Conjunctivae normal.   Neck:      Thyroid: No thyroid mass or thyromegaly.      Vascular: No JVD.   Cardiovascular:      Rate and Rhythm: Normal rate and regular rhythm.      Chest Wall: PMI is not displaced. No thrill.      Heart sounds: Normal heart sounds. No murmur heard.     Comments: Right popliteal region with firm edematous region that is " tender. Palpable cord noted  Pulmonary:      Effort: Pulmonary effort is normal. No accessory muscle usage or respiratory distress.      Breath sounds: Normal breath sounds.   Chest:      Chest wall: No tenderness.   Abdominal:      General: Bowel sounds are normal. There is no distension.      Palpations: Abdomen is soft.      Tenderness: There is no abdominal tenderness.   Musculoskeletal:         General: Normal range of motion.      Cervical back: Normal range of motion.      Right lower leg: Edema present.      Left lower leg: Edema present.   Skin:     General: Skin is warm and dry.      Coloration: Skin is not ashen, jaundiced, mottled or pale.      Findings: No erythema.             Comments: Mild erythema and flakiness noted to bilateral lower extremities at ankle/calf region anteriorly greater than posteriorly   Neurological:      General: No focal deficit present.      Mental Status: She is alert and oriented to person, place, and time.   Psychiatric:         Attention and Perception: Attention and perception normal. She is attentive.         Mood and Affect: Mood and affect normal. Mood is not anxious or depressed. Affect is not angry or inappropriate.         Speech: Speech normal.         Behavior: Behavior normal. Behavior is cooperative.         Thought Content: Thought content normal. Thought content does not include homicidal or suicidal ideation. Thought content does not include homicidal or suicidal plan.         Cognition and Memory: Cognition and memory normal.         Judgment: Judgment normal.             Assessment & Plan   Diagnoses and all orders for this visit:    1. Type 2 diabetes mellitus without complication, without long-term current use of insulin (Primary)  -     POC Glycosylated Hemoglobin (Hb A1C)  -     CBC (No Diff); Future  -     Comprehensive Metabolic Panel; Future  -     Lipid Panel; Future  -     lisinopril (PRINIVIL,ZESTRIL) 20 MG tablet; Take 1 tablet by mouth Daily.   Dispense: 90 tablet; Refill: 1    2. Essential hypertension  -     CBC (No Diff); Future  -     Comprehensive Metabolic Panel; Future  -     Lipid Panel; Future  -     lisinopril (PRINIVIL,ZESTRIL) 20 MG tablet; Take 1 tablet by mouth Daily.  Dispense: 90 tablet; Refill: 1  -     amLODIPine (NORVASC) 10 MG tablet; Take 1 tablet by mouth Daily.  Dispense: 90 tablet; Refill: 1  -     metoprolol tartrate (LOPRESSOR) 25 MG tablet; Take 0.5 tablets by mouth Every 12 (Twelve) Hours.  Dispense: 90 tablet; Refill: 1    3. Fatigue, unspecified type  -     Cancel: Iron Profile w/o Ferritin; Future  -     Ferritin; Future  -     Vitamin B12; Future  -     Vitamin D,25-Hydroxy; Future  -     Iron Profile w/o Ferritin; Future  -     TSH Rfx On Abnormal To Free T4; Future    4. Vitamin D deficiency  -     Vitamin D,25-Hydroxy; Future    5. Pain and swelling of lower leg, right  -     Duplex Venous Lower Extremity - Right CAR; Future    6. Paroxysmal atrial fibrillation  -     metoprolol tartrate (LOPRESSOR) 25 MG tablet; Take 0.5 tablets by mouth Every 12 (Twelve) Hours.  Dispense: 90 tablet; Refill: 1    7. Breast cancer screening by mammogram  -     Mammo Screening Digital Tomosynthesis Bilateral With CAD; Future    8. Nexplanon in place  -     Ambulatory Referral to Gynecology    9. Venous stasis  -     triamcinolone (KENALOG) 0.1 % cream; Apply 1 Application topically to the appropriate area as directed 2 (Two) Times a Day.  Dispense: 80 g; Refill: 1    10. Irregular menses  -     Estradiol; Future  -     Follicle Stimulating Hormone; Future  -     Luteinizing Hormone; Future  -     Progesterone; Future  -     Testosterone, Free, Total; Future    11. Mood swings  -     Estradiol; Future  -     Follicle Stimulating Hormone; Future  -     Luteinizing Hormone; Future  -     Progesterone; Future  -     Testosterone, Free, Total; Future    12. Mild depression  -     escitalopram (Lexapro) 10 MG tablet; Take 1 tablet by mouth  Daily.  Dispense: 30 tablet; Refill: 1      Reports menses are irregular and starting to be heavier.  She does have Nexplanon in place.  Would like a referral to gynecology.  Will check labs today and place referral.  For her depression we will start her on Lexapro.  Adverse effects and expectations discussed.  Will get her set up for routine mammogram as well  She does have some swelling in the right popliteal fossa.  Some tenderness in the leg.  Also has some mild erythema/flakiness to bilateral lower extremities in the ankle/calf region.  Will go ahead and treat with triamcinolone and obtain a venous duplex of the right lower extremity to rule out DVT she has not been taking her anticoagulant recently  Restart antihypertensives and antidiabetic agents as ordered.  Encouraged ADA diet and routine physical activity.  Will have her follow-up in about 4 for reevaluation if BP mood.  Sooner if any new concerns or worsening of symptoms.    Return in about 4 weeks (around 7/1/2025) for Recheck, and need to collect labs today.    I discussed my findings,recommendations, and plan of care was with the patient. They verbalized understanding and agreement.  Patient was encouraged to keep me informed of any acute changes, lack of improvement, or any new concerning symptoms.

## 2025-06-04 ENCOUNTER — LAB (OUTPATIENT)
Dept: INTERNAL MEDICINE | Facility: CLINIC | Age: 45
End: 2025-06-04
Payer: COMMERCIAL

## 2025-06-04 ENCOUNTER — HOSPITAL ENCOUNTER (OUTPATIENT)
Dept: CARDIOLOGY | Facility: HOSPITAL | Age: 45
Discharge: HOME | End: 2025-06-04
Admitting: NURSE PRACTITIONER
Payer: COMMERCIAL

## 2025-06-04 VITALS — BODY MASS INDEX: 43.4 KG/M2 | HEIGHT: 69 IN | WEIGHT: 293 LBS

## 2025-06-04 DIAGNOSIS — M79.89 PAIN AND SWELLING OF LOWER LEG, RIGHT: ICD-10-CM

## 2025-06-04 DIAGNOSIS — M79.661 PAIN AND SWELLING OF LOWER LEG, RIGHT: ICD-10-CM

## 2025-06-04 DIAGNOSIS — I10 ESSENTIAL HYPERTENSION: ICD-10-CM

## 2025-06-04 DIAGNOSIS — R45.86 MOOD SWINGS: ICD-10-CM

## 2025-06-04 DIAGNOSIS — E55.9 VITAMIN D DEFICIENCY: ICD-10-CM

## 2025-06-04 DIAGNOSIS — N92.6 IRREGULAR MENSES: ICD-10-CM

## 2025-06-04 DIAGNOSIS — E11.9 TYPE 2 DIABETES MELLITUS WITHOUT COMPLICATION, WITHOUT LONG-TERM CURRENT USE OF INSULIN: ICD-10-CM

## 2025-06-04 DIAGNOSIS — R53.83 FATIGUE, UNSPECIFIED TYPE: ICD-10-CM

## 2025-06-04 LAB
25(OH)D3 SERPL-MCNC: 24.4 NG/ML (ref 30–100)
ALBUMIN SERPL-MCNC: 3.9 G/DL (ref 3.5–5.2)
ALBUMIN/GLOB SERPL: 1.3 G/DL
ALP SERPL-CCNC: 59 U/L (ref 39–117)
ALT SERPL W P-5'-P-CCNC: 12 U/L (ref 1–33)
ANION GAP SERPL CALCULATED.3IONS-SCNC: 10.9 MMOL/L (ref 5–15)
AST SERPL-CCNC: 19 U/L (ref 1–32)
BH CV LOWER VASCULAR LEFT COMMON FEMORAL AUGMENT: NORMAL
BH CV LOWER VASCULAR LEFT COMMON FEMORAL COMPRESS: NORMAL
BH CV LOWER VASCULAR LEFT COMMON FEMORAL PHASIC: NORMAL
BH CV LOWER VASCULAR LEFT COMMON FEMORAL SPONT: NORMAL
BH CV LOWER VASCULAR RIGHT COMMON FEMORAL AUGMENT: NORMAL
BH CV LOWER VASCULAR RIGHT COMMON FEMORAL COMPRESS: NORMAL
BH CV LOWER VASCULAR RIGHT COMMON FEMORAL PHASIC: NORMAL
BH CV LOWER VASCULAR RIGHT COMMON FEMORAL SPONT: NORMAL
BH CV LOWER VASCULAR RIGHT DISTAL FEMORAL COMPRESS: NORMAL
BH CV LOWER VASCULAR RIGHT GASTRONEMIUS COMPRESS: NORMAL
BH CV LOWER VASCULAR RIGHT GREATER SAPH AK COMPRESS: NORMAL
BH CV LOWER VASCULAR RIGHT GREATER SAPH BK COMPRESS: NORMAL
BH CV LOWER VASCULAR RIGHT LESSER SAPH COMPRESS: NORMAL
BH CV LOWER VASCULAR RIGHT MID FEMORAL AUGMENT: NORMAL
BH CV LOWER VASCULAR RIGHT MID FEMORAL COMPRESS: NORMAL
BH CV LOWER VASCULAR RIGHT MID FEMORAL PHASIC: NORMAL
BH CV LOWER VASCULAR RIGHT MID FEMORAL SPONT: NORMAL
BH CV LOWER VASCULAR RIGHT PERONEAL AUGMENT: NORMAL
BH CV LOWER VASCULAR RIGHT PERONEAL COMPRESS: NORMAL
BH CV LOWER VASCULAR RIGHT POPLITEAL AUGMENT: NORMAL
BH CV LOWER VASCULAR RIGHT POPLITEAL COMPRESS: NORMAL
BH CV LOWER VASCULAR RIGHT POPLITEAL PHASIC: NORMAL
BH CV LOWER VASCULAR RIGHT POPLITEAL SPONT: NORMAL
BH CV LOWER VASCULAR RIGHT POSTERIOR TIBIAL AUGMENT: NORMAL
BH CV LOWER VASCULAR RIGHT POSTERIOR TIBIAL COMPRESS: NORMAL
BH CV LOWER VASCULAR RIGHT PROFUNDA FEMORAL COMPRESS: NORMAL
BH CV LOWER VASCULAR RIGHT PROXIMAL FEMORAL COMPRESS: NORMAL
BH CV LOWER VASCULAR RIGHT SAPHENOFEMORAL JUNCTION COMPRESS: NORMAL
BILIRUB SERPL-MCNC: 0.4 MG/DL (ref 0–1.2)
BUN SERPL-MCNC: 7 MG/DL (ref 6–20)
BUN/CREAT SERPL: 7.6 (ref 7–25)
CALCIUM SPEC-SCNC: 9.7 MG/DL (ref 8.6–10.5)
CHLORIDE SERPL-SCNC: 106 MMOL/L (ref 98–107)
CHOLEST SERPL-MCNC: 128 MG/DL (ref 0–200)
CO2 SERPL-SCNC: 23.1 MMOL/L (ref 22–29)
CREAT SERPL-MCNC: 0.92 MG/DL (ref 0.57–1)
DEPRECATED RDW RBC AUTO: 47.6 FL (ref 37–54)
EGFRCR SERPLBLD CKD-EPI 2021: 78.9 ML/MIN/1.73
ERYTHROCYTE [DISTWIDTH] IN BLOOD BY AUTOMATED COUNT: 16.8 % (ref 12.3–15.4)
ESTRADIOL SERPL HS-MCNC: 153 PG/ML
FERRITIN SERPL-MCNC: 12 NG/ML (ref 13–150)
FSH SERPL-ACNC: 1.65 MIU/ML
GLOBULIN UR ELPH-MCNC: 3.1 GM/DL
GLUCOSE SERPL-MCNC: 108 MG/DL (ref 65–99)
HCT VFR BLD AUTO: 34.3 % (ref 34–46.6)
HDLC SERPL-MCNC: 48 MG/DL (ref 40–60)
HGB BLD-MCNC: 9.7 G/DL (ref 12–15.9)
IRON 24H UR-MRATE: 19 MCG/DL (ref 37–145)
IRON SATN MFR SERPL: 4 % (ref 20–50)
LDLC SERPL CALC-MCNC: 63 MG/DL (ref 0–100)
LDLC/HDLC SERPL: 1.31 {RATIO}
LH SERPL-ACNC: 2.57 MIU/ML
MCH RBC QN AUTO: 22.4 PG (ref 26.6–33)
MCHC RBC AUTO-ENTMCNC: 28.3 G/DL (ref 31.5–35.7)
MCV RBC AUTO: 79 FL (ref 79–97)
PLATELET # BLD AUTO: 293 10*3/MM3 (ref 140–450)
PMV BLD AUTO: 10.6 FL (ref 6–12)
POTASSIUM SERPL-SCNC: 4 MMOL/L (ref 3.5–5.2)
PROGEST SERPL-MCNC: 0.4 NG/ML
PROT SERPL-MCNC: 7 G/DL (ref 6–8.5)
RBC # BLD AUTO: 4.34 10*6/MM3 (ref 3.77–5.28)
SODIUM SERPL-SCNC: 140 MMOL/L (ref 136–145)
TIBC SERPL-MCNC: 495 MCG/DL (ref 298–536)
TRANSFERRIN SERPL-MCNC: 332 MG/DL (ref 200–360)
TRIGL SERPL-MCNC: 86 MG/DL (ref 0–150)
TSH SERPL DL<=0.05 MIU/L-ACNC: 1.83 UIU/ML (ref 0.27–4.2)
VIT B12 BLD-MCNC: 230 PG/ML (ref 211–946)
VLDLC SERPL-MCNC: 17 MG/DL (ref 5–40)
WBC NRBC COR # BLD AUTO: 6.14 10*3/MM3 (ref 3.4–10.8)

## 2025-06-04 PROCEDURE — 82728 ASSAY OF FERRITIN: CPT | Performed by: NURSE PRACTITIONER

## 2025-06-04 PROCEDURE — 82306 VITAMIN D 25 HYDROXY: CPT | Performed by: NURSE PRACTITIONER

## 2025-06-04 PROCEDURE — 83540 ASSAY OF IRON: CPT | Performed by: NURSE PRACTITIONER

## 2025-06-04 PROCEDURE — 84403 ASSAY OF TOTAL TESTOSTERONE: CPT | Performed by: NURSE PRACTITIONER

## 2025-06-04 PROCEDURE — 80061 LIPID PANEL: CPT | Performed by: NURSE PRACTITIONER

## 2025-06-04 PROCEDURE — 80050 GENERAL HEALTH PANEL: CPT | Performed by: NURSE PRACTITIONER

## 2025-06-04 PROCEDURE — 84466 ASSAY OF TRANSFERRIN: CPT | Performed by: NURSE PRACTITIONER

## 2025-06-04 PROCEDURE — 93971 EXTREMITY STUDY: CPT

## 2025-06-04 PROCEDURE — 84402 ASSAY OF FREE TESTOSTERONE: CPT | Performed by: NURSE PRACTITIONER

## 2025-06-04 PROCEDURE — 82607 VITAMIN B-12: CPT | Performed by: NURSE PRACTITIONER

## 2025-06-04 PROCEDURE — 84144 ASSAY OF PROGESTERONE: CPT | Performed by: NURSE PRACTITIONER

## 2025-06-04 PROCEDURE — 83001 ASSAY OF GONADOTROPIN (FSH): CPT | Performed by: NURSE PRACTITIONER

## 2025-06-04 PROCEDURE — 82670 ASSAY OF TOTAL ESTRADIOL: CPT | Performed by: NURSE PRACTITIONER

## 2025-06-04 PROCEDURE — 83002 ASSAY OF GONADOTROPIN (LH): CPT | Performed by: NURSE PRACTITIONER

## 2025-06-05 LAB — SPECIMEN STATUS: NORMAL

## 2025-06-06 LAB
TESTOST FREE SERPL-MCNC: 0.6 PG/ML (ref 0–4.2)
TESTOST SERPL-MCNC: 23 NG/DL (ref 4–50)

## 2025-07-01 ENCOUNTER — OFFICE VISIT (OUTPATIENT)
Dept: INTERNAL MEDICINE | Facility: CLINIC | Age: 45
End: 2025-07-01
Payer: COMMERCIAL

## 2025-07-01 VITALS
DIASTOLIC BLOOD PRESSURE: 76 MMHG | RESPIRATION RATE: 16 BRPM | BODY MASS INDEX: 43.4 KG/M2 | HEART RATE: 86 BPM | SYSTOLIC BLOOD PRESSURE: 138 MMHG | TEMPERATURE: 97.1 F | WEIGHT: 293 LBS | OXYGEN SATURATION: 100 % | HEIGHT: 69 IN

## 2025-07-01 DIAGNOSIS — E53.8 VITAMIN B 12 DEFICIENCY: ICD-10-CM

## 2025-07-01 DIAGNOSIS — I10 ESSENTIAL HYPERTENSION: ICD-10-CM

## 2025-07-01 DIAGNOSIS — I80.01 THROMBOPHLEBITIS OF SUPERFICIAL VEINS OF RIGHT LOWER EXTREMITY: Primary | ICD-10-CM

## 2025-07-01 DIAGNOSIS — E11.9 TYPE 2 DIABETES MELLITUS WITHOUT COMPLICATION, WITHOUT LONG-TERM CURRENT USE OF INSULIN: ICD-10-CM

## 2025-07-01 PROCEDURE — 82570 ASSAY OF URINE CREATININE: CPT | Performed by: NURSE PRACTITIONER

## 2025-07-01 PROCEDURE — 82043 UR ALBUMIN QUANTITATIVE: CPT | Performed by: NURSE PRACTITIONER

## 2025-07-01 RX ORDER — CYANOCOBALAMIN 1000 UG/ML
1000 INJECTION, SOLUTION INTRAMUSCULAR; SUBCUTANEOUS
Status: SHIPPED | OUTPATIENT
Start: 2025-07-01 | End: 2025-08-26

## 2025-07-01 RX ADMIN — CYANOCOBALAMIN 1000 MCG: 1000 INJECTION, SOLUTION INTRAMUSCULAR; SUBCUTANEOUS at 16:06

## 2025-07-03 LAB
ALBUMIN UR-MCNC: <1.2 MG/DL
CREAT UR-MCNC: 91.7 MG/DL
MICROALBUMIN/CREAT UR: NORMAL MG/G{CREAT}

## 2025-07-04 NOTE — PROGRESS NOTES
Fuad Nielsen presents to [facility] for     Chief Complaint  Chief Complaint   Patient presents with    right lower extremity superficial thrombophlebitis        PCP:  Mandie Tobar APRN    Subjective        History of Present Illness    Fuad is a 46-year-old female who is here to follow-up on right lower extremity superficial thrombophlebitis she was seen in my clinic on 6/4/2020 for and noted to have significant swelling and firmness in the right lower extremity in the popliteal and lateral region.  Venous duplex demonstrated acute right lower extremity extremity superficial thrombophlebitis noted in the varicosity below the knee.  She was started on Eliquis.  She has been tolerating it well and denies any bleeding.  She verbalizes significant improvement in the firmness to the right lower extremity vessels.  She reports it is now only about pea-sized    On her most recent labs, her vitamin B12 was normal but on the lower end of normal.  She does have a lot of fatigue so is curious about B12 supplementation as she has been on this in the past    She does have type 2 diabetes is due for urine microalbumin so we can go ahead and get that she is here today    Health Maintenance:   Health Maintenance   Topic Date Due    ANNUAL PHYSICAL  Never done    DIABETIC FOOT EXAM  09/23/2021    DIABETIC EYE EXAM  06/04/2022    PAP SMEAR  03/09/2023    COVID-19 Vaccine (3 - 2024-25 season) 09/01/2024    MAMMOGRAM  03/12/2025    INFLUENZA VACCINE  07/01/2025    HEMOGLOBIN A1C  12/03/2025    URINE MICROALBUMIN-CREATININE RATIO (uACR)  07/03/2026    TDAP/TD VACCINES (4 - Td or Tdap) 07/16/2028    HEPATITIS C SCREENING  Completed    Hepatitis B  Completed    Pneumococcal Vaccine 0-49  Completed       Review of Systems   Constitutional:  Negative for fatigue, fever and unexpected weight loss.   Eyes:  Negative for blurred vision, double vision and visual disturbance.   Respiratory:   Negative for cough, shortness of breath and wheezing.    Cardiovascular:  Negative for chest pain, palpitations and leg swelling.   Gastrointestinal:  Negative for abdominal pain, constipation, diarrhea, nausea and vomiting.   Genitourinary:  Negative for difficulty urinating, frequency and urgency.   Musculoskeletal:  Negative for arthralgias and myalgias.   Skin:  Negative for color change and rash.   Neurological:  Negative for dizziness, weakness and headache.   Hematological:  Negative for adenopathy. Does not bruise/bleed easily.         No Known Allergies  Current Outpatient Medications on File Prior to Visit   Medication Sig Dispense Refill    apixaban (ELIQUIS) 5 MG tablet tablet Take 1 tablet by mouth 2 (Two) Times a Day. 60 tablet 2    Cholecalciferol (Vitamin D3) 1.25 MG (19277 UT) capsule Take 1 capsule by mouth Every 7 (Seven) Days for 8 doses. 8 capsule 0    escitalopram (Lexapro) 10 MG tablet Take 1 tablet by mouth Daily. 30 tablet 1    Etonogestrel (NEXPLANON SC) Inject  under the skin into the appropriate area as directed.      ferrous gluconate (FERGON) 324 MG tablet Take 1 tablet by mouth Daily With Breakfast. 30 tablet 3    lisinopril (PRINIVIL,ZESTRIL) 20 MG tablet Take 1 tablet by mouth Daily. 90 tablet 1    metoprolol tartrate (LOPRESSOR) 25 MG tablet Take 0.5 tablets by mouth Every 12 (Twelve) Hours. 90 tablet 1    triamcinolone (KENALOG) 0.1 % cream Apply 1 Application topically to the appropriate area as directed 2 (Two) Times a Day. 80 g 1    amLODIPine (NORVASC) 10 MG tablet Take 1 tablet by mouth Daily. 90 tablet 1     No current facility-administered medications on file prior to visit.         The following portions of the patient's history were reviewed and updated as appropriate: allergies, current medications, past family history, past medical history, past social history, past surgical history and problem list and are available for review within electronic record    Objective  "    Result Review :                    Vital Signs:   /76 (BP Location: Left arm, Patient Position: Sitting, Cuff Size: Large Adult)   Pulse 86   Temp 97.1 °F (36.2 °C) (Infrared)   Resp 16   Ht 175.3 cm (69.02\")   Wt (!) 152 kg (336 lb)   SpO2 100%   BMI 49.59 kg/m²         Physical Exam  Vitals and nursing note reviewed.   Constitutional:       General: She is not in acute distress.     Appearance: Normal appearance. She is well-developed. She is morbidly obese. She is not ill-appearing or diaphoretic.   HENT:      Head: Normocephalic and atraumatic.   Eyes:      General: No scleral icterus.     Conjunctiva/sclera: Conjunctivae normal.   Neck:      Thyroid: No thyroid mass or thyromegaly.      Vascular: No JVD.   Cardiovascular:      Rate and Rhythm: Normal rate and regular rhythm.      Chest Wall: PMI is not displaced. No thrill.      Heart sounds: Normal heart sounds. No murmur heard.     Comments: Right popliteal region with firm edematous pea sized area. Significantly smaller than last visit   Pulmonary:      Effort: Pulmonary effort is normal. No accessory muscle usage or respiratory distress.      Breath sounds: Normal breath sounds.   Chest:      Chest wall: No tenderness.   Abdominal:      General: Bowel sounds are normal. There is no distension.      Palpations: Abdomen is soft.      Tenderness: There is no abdominal tenderness.   Musculoskeletal:         General: Normal range of motion.      Cervical back: Normal range of motion.      Right lower leg: No edema.      Left lower leg: No edema.   Skin:     General: Skin is warm and dry.      Coloration: Skin is not ashen, jaundiced, mottled or pale.      Findings: No erythema.             Comments: Mild erythema and flakiness noted to bilateral lower extremities at ankle/calf region anteriorly greater than posteriorly   Neurological:      General: No focal deficit present.      Mental Status: She is alert and oriented to person, place, and " time.   Psychiatric:         Attention and Perception: Attention and perception normal. She is attentive.         Mood and Affect: Mood and affect normal. Mood is not anxious or depressed. Affect is not angry or inappropriate.         Speech: Speech normal.         Behavior: Behavior normal. Behavior is cooperative.         Thought Content: Thought content normal. Thought content does not include homicidal or suicidal ideation. Thought content does not include homicidal or suicidal plan.         Cognition and Memory: Cognition and memory normal.         Judgment: Judgment normal.                 Assessment and Plan      Diagnoses and all orders for this visit:    1. Thrombophlebitis of superficial veins of right lower extremity (Primary)  -     Duplex Venous Lower Extremity - Right CAR; Future    2. Essential hypertension    3. Vitamin B 12 deficiency  -     cyanocobalamin injection 1,000 mcg    4. Type 2 diabetes mellitus without complication, without long-term current use of insulin  -     Microalbumin / Creatinine Urine Ratio - Urine, Clean Catch; Future  -     Microalbumin / Creatinine Urine Ratio - Urine, Clean Catch    Continue Eliquis for total of 3 months.  Will go ahead and get her set up for a venous duplex at 3 months so we can ascertain whether or not we need to continue this.  Consider seeing hematology further [place] as well.  Hypertension diabetes stable.  Check microalbumin.  Would get her started on vitamin B12 injections.  She is not sure that she could do this at home on her own and prefer her to come to the office today but does not think she can come every day.  Will go with a regimen of once a week for 8 weeks then monthly thereafter.  Plan to repeat her B12 level in a few months.    Follow Up     Patient was given instructions and counseling regarding her condition or for health maintenance advice. Please see specific information pulled into the AVS if appropriate.   Any new medication's adverse  effects have been discussed in detail with patient  Patient was encouraged to keep me informed of any acute changes, lack of improvement, or any new concerning symptoms.    Return in about 2 months (around 9/1/2025) for chronic condition follow up.          Dictated Utilizing Dragon Dictation   Please note that portions of this note were completed with a voice recognition program.   Part of this note may be an electronic transcription/translation of spoken language to printed text using the Dragon Dictation System.

## 2025-07-11 ENCOUNTER — CLINICAL SUPPORT (OUTPATIENT)
Dept: INTERNAL MEDICINE | Facility: CLINIC | Age: 45
End: 2025-07-11
Payer: COMMERCIAL

## 2025-07-11 ENCOUNTER — TELEPHONE (OUTPATIENT)
Dept: INTERNAL MEDICINE | Facility: CLINIC | Age: 45
End: 2025-07-11
Payer: COMMERCIAL

## 2025-07-11 DIAGNOSIS — E53.8 B12 DEFICIENCY: Primary | Chronic | ICD-10-CM

## 2025-07-11 PROCEDURE — 96372 THER/PROPH/DIAG INJ SC/IM: CPT | Performed by: NURSE PRACTITIONER

## 2025-07-11 RX ADMIN — CYANOCOBALAMIN 1000 MCG: 1000 INJECTION, SOLUTION INTRAMUSCULAR; SUBCUTANEOUS at 10:22

## 2025-07-21 ENCOUNTER — CLINICAL SUPPORT (OUTPATIENT)
Dept: INTERNAL MEDICINE | Facility: CLINIC | Age: 45
End: 2025-07-21
Payer: COMMERCIAL

## 2025-07-21 DIAGNOSIS — E53.8 B12 DEFICIENCY: Primary | Chronic | ICD-10-CM

## 2025-07-21 PROCEDURE — 96372 THER/PROPH/DIAG INJ SC/IM: CPT | Performed by: NURSE PRACTITIONER

## 2025-07-21 RX ADMIN — CYANOCOBALAMIN 1000 MCG: 1000 INJECTION, SOLUTION INTRAMUSCULAR; SUBCUTANEOUS at 09:59

## 2025-07-24 DIAGNOSIS — R92.8 ABNORMAL MAMMOGRAM: Primary | ICD-10-CM

## 2025-08-02 ENCOUNTER — APPOINTMENT (OUTPATIENT)
Facility: HOSPITAL | Age: 45
End: 2025-08-02
Payer: COMMERCIAL

## 2025-08-02 ENCOUNTER — HOSPITAL ENCOUNTER (EMERGENCY)
Facility: HOSPITAL | Age: 45
Discharge: HOME | End: 2025-08-02
Attending: STUDENT IN AN ORGANIZED HEALTH CARE EDUCATION/TRAINING PROGRAM
Payer: COMMERCIAL

## 2025-08-02 VITALS
SYSTOLIC BLOOD PRESSURE: 149 MMHG | RESPIRATION RATE: 20 BRPM | HEIGHT: 67 IN | HEART RATE: 67 BPM | WEIGHT: 293 LBS | DIASTOLIC BLOOD PRESSURE: 78 MMHG | BODY MASS INDEX: 45.99 KG/M2 | TEMPERATURE: 98.3 F | OXYGEN SATURATION: 97 %

## 2025-08-02 DIAGNOSIS — I10 HYPERTENSION, UNSPECIFIED TYPE: ICD-10-CM

## 2025-08-02 DIAGNOSIS — G43.009 MIGRAINE WITHOUT AURA AND WITHOUT STATUS MIGRAINOSUS, NOT INTRACTABLE: ICD-10-CM

## 2025-08-02 DIAGNOSIS — R42 DIZZINESS: Primary | ICD-10-CM

## 2025-08-02 DIAGNOSIS — I95.1 ORTHOSTASIS: ICD-10-CM

## 2025-08-02 LAB
ALBUMIN SERPL-MCNC: 4.2 G/DL (ref 3.5–5.2)
ALBUMIN/GLOB SERPL: 1.2 G/DL
ALP SERPL-CCNC: 85 U/L (ref 39–117)
ALT SERPL W P-5'-P-CCNC: 13 U/L (ref 1–33)
AMPHET+METHAMPHET UR QL: NEGATIVE
AMPHETAMINES UR QL: NEGATIVE
ANION GAP SERPL CALCULATED.3IONS-SCNC: 10.8 MMOL/L (ref 5–15)
APTT PPP: 25.6 SECONDS (ref 22–39)
AST SERPL-CCNC: 19 U/L (ref 1–32)
BARBITURATES UR QL SCN: NEGATIVE
BASOPHILS # BLD AUTO: 0.05 10*3/MM3 (ref 0–0.2)
BASOPHILS NFR BLD AUTO: 0.6 % (ref 0–1.5)
BENZODIAZ UR QL SCN: NEGATIVE
BILIRUB SERPL-MCNC: 0.5 MG/DL (ref 0–1.2)
BILIRUB UR QL STRIP: NEGATIVE
BUN BLDA-MCNC: 10 MG/DL (ref 8–26)
BUN SERPL-MCNC: 10 MG/DL (ref 6–20)
BUN/CREAT SERPL: 11 (ref 7–25)
BUPRENORPHINE SERPL-MCNC: NEGATIVE NG/ML
CA-I BLDA-SCNC: 1.36 MMOL/L (ref 4.5–5.3)
CALCIUM SPEC-SCNC: 10.3 MG/DL (ref 8.6–10.5)
CANNABINOIDS SERPL QL: NEGATIVE
CHLORIDE BLDA-SCNC: 103 MMOL/L (ref 98–109)
CHLORIDE SERPL-SCNC: 107 MMOL/L (ref 98–107)
CLARITY UR: CLEAR
CO2 BLDA-SCNC: 24 MMOL/L (ref 23–27)
CO2 SERPL-SCNC: 22.2 MMOL/L (ref 22–29)
COCAINE UR QL: NEGATIVE
COLOR UR: YELLOW
CREAT BLDA-MCNC: 1 MG/DL (ref 0.6–1.3)
CREAT SERPL-MCNC: 0.91 MG/DL (ref 0.57–1)
DEPRECATED RDW RBC AUTO: 46 FL (ref 37–54)
EGFRCR SERPLBLD CKD-EPI 2021: 71.4 ML/MIN/1.73
EGFRCR SERPLBLD CKD-EPI 2021: 79.9 ML/MIN/1.73
EOSINOPHIL # BLD AUTO: 0.17 10*3/MM3 (ref 0–0.4)
EOSINOPHIL NFR BLD AUTO: 1.9 % (ref 0.3–6.2)
ERYTHROCYTE [DISTWIDTH] IN BLOOD BY AUTOMATED COUNT: 17 % (ref 12.3–15.4)
FENTANYL UR-MCNC: NEGATIVE NG/ML
GLOBULIN UR ELPH-MCNC: 3.5 GM/DL
GLUCOSE BLDC GLUCOMTR-MCNC: 92 MG/DL (ref 70–130)
GLUCOSE SERPL-MCNC: 87 MG/DL (ref 65–99)
GLUCOSE UR STRIP-MCNC: NEGATIVE MG/DL
HBA1C MFR BLD: 5.87 % (ref 4.8–5.6)
HCT VFR BLD AUTO: 32.5 % (ref 34–46.6)
HCT VFR BLDA CALC: 32 % (ref 38–51)
HGB BLD-MCNC: 9.6 G/DL (ref 12–15.9)
HGB BLDA-MCNC: 10.9 G/DL (ref 12–17)
HGB UR QL STRIP.AUTO: NEGATIVE
HOLD SPECIMEN: NORMAL
IMM GRANULOCYTES # BLD AUTO: 0.01 10*3/MM3 (ref 0–0.05)
IMM GRANULOCYTES NFR BLD AUTO: 0.1 % (ref 0–0.5)
INR PPP: 1.17 (ref 0.89–1.12)
KETONES UR QL STRIP: NEGATIVE
LEUKOCYTE ESTERASE UR QL STRIP.AUTO: NEGATIVE
LYMPHOCYTES # BLD AUTO: 3.27 10*3/MM3 (ref 0.7–3.1)
LYMPHOCYTES NFR BLD AUTO: 36.9 % (ref 19.6–45.3)
MAGNESIUM SERPL-MCNC: 2 MG/DL (ref 1.6–2.6)
MCH RBC QN AUTO: 21.9 PG (ref 26.6–33)
MCHC RBC AUTO-ENTMCNC: 29.5 G/DL (ref 31.5–35.7)
MCV RBC AUTO: 74 FL (ref 79–97)
METHADONE UR QL SCN: NEGATIVE
MONOCYTES # BLD AUTO: 0.68 10*3/MM3 (ref 0.1–0.9)
MONOCYTES NFR BLD AUTO: 7.7 % (ref 5–12)
NEUTROPHILS NFR BLD AUTO: 4.69 10*3/MM3 (ref 1.7–7)
NEUTROPHILS NFR BLD AUTO: 52.8 % (ref 42.7–76)
NITRITE UR QL STRIP: NEGATIVE
OPIATES UR QL: NEGATIVE
OXYCODONE UR QL SCN: NEGATIVE
PCP UR QL SCN: NEGATIVE
PH UR STRIP.AUTO: 6 [PH] (ref 5–8)
PLATELET # BLD AUTO: 360 10*3/MM3 (ref 140–450)
PMV BLD AUTO: 10.2 FL (ref 6–12)
POTASSIUM BLDA-SCNC: 3.8 MMOL/L (ref 3.5–4.9)
POTASSIUM SERPL-SCNC: 3.6 MMOL/L (ref 3.5–5.2)
PROT SERPL-MCNC: 7.7 G/DL (ref 6–8.5)
PROT UR QL STRIP: NEGATIVE
PROTHROMBIN TIME: 15.2 SECONDS (ref 12.2–14.5)
RBC # BLD AUTO: 4.39 10*6/MM3 (ref 3.77–5.28)
SODIUM BLD-SCNC: 141 MMOL/L (ref 138–146)
SODIUM SERPL-SCNC: 140 MMOL/L (ref 136–145)
SP GR UR STRIP: <=1.005 (ref 1–1.03)
T4 FREE SERPL-MCNC: 1.02 NG/DL (ref 0.92–1.68)
TRICYCLICS UR QL SCN: NEGATIVE
TROPONIN T SERPL HS-MCNC: 7 NG/L
TSH SERPL DL<=0.05 MIU/L-ACNC: 1.59 UIU/ML (ref 0.27–4.2)
UROBILINOGEN UR QL STRIP: NORMAL
WBC NRBC COR # BLD AUTO: 8.87 10*3/MM3 (ref 3.4–10.8)
WHOLE BLOOD HOLD COAG: NORMAL
WHOLE BLOOD HOLD SPECIMEN: NORMAL

## 2025-08-02 PROCEDURE — 71045 X-RAY EXAM CHEST 1 VIEW: CPT

## 2025-08-02 PROCEDURE — 25010000002 DEXAMETHASONE PER 1 MG: Performed by: PHYSICIAN ASSISTANT

## 2025-08-02 PROCEDURE — 80307 DRUG TEST PRSMV CHEM ANLYZR: CPT | Performed by: STUDENT IN AN ORGANIZED HEALTH CARE EDUCATION/TRAINING PROGRAM

## 2025-08-02 PROCEDURE — 70553 MRI BRAIN STEM W/O & W/DYE: CPT

## 2025-08-02 PROCEDURE — 83735 ASSAY OF MAGNESIUM: CPT | Performed by: STUDENT IN AN ORGANIZED HEALTH CARE EDUCATION/TRAINING PROGRAM

## 2025-08-02 PROCEDURE — 85610 PROTHROMBIN TIME: CPT | Performed by: PHYSICIAN ASSISTANT

## 2025-08-02 PROCEDURE — 93005 ELECTROCARDIOGRAM TRACING: CPT | Performed by: STUDENT IN AN ORGANIZED HEALTH CARE EDUCATION/TRAINING PROGRAM

## 2025-08-02 PROCEDURE — 70450 CT HEAD/BRAIN W/O DYE: CPT

## 2025-08-02 PROCEDURE — 0042T HC CT CEREBRAL PERFUSION W/WO CONTRAST: CPT

## 2025-08-02 PROCEDURE — 80061 LIPID PANEL: CPT | Performed by: PHYSICIAN ASSISTANT

## 2025-08-02 PROCEDURE — 25010000002 MAGNESIUM SULFATE 2 GM/50ML SOLUTION: Performed by: STUDENT IN AN ORGANIZED HEALTH CARE EDUCATION/TRAINING PROGRAM

## 2025-08-02 PROCEDURE — 99285 EMERGENCY DEPT VISIT HI MDM: CPT | Performed by: STUDENT IN AN ORGANIZED HEALTH CARE EDUCATION/TRAINING PROGRAM

## 2025-08-02 PROCEDURE — 25510000002 GADOBENATE DIMEGLUMINE 529 MG/ML SOLUTION: Performed by: STUDENT IN AN ORGANIZED HEALTH CARE EDUCATION/TRAINING PROGRAM

## 2025-08-02 PROCEDURE — 96375 TX/PRO/DX INJ NEW DRUG ADDON: CPT

## 2025-08-02 PROCEDURE — 96366 THER/PROPH/DIAG IV INF ADDON: CPT

## 2025-08-02 PROCEDURE — 25510000001 IOPAMIDOL PER 1 ML: Performed by: STUDENT IN AN ORGANIZED HEALTH CARE EDUCATION/TRAINING PROGRAM

## 2025-08-02 PROCEDURE — 80047 BASIC METABLC PNL IONIZED CA: CPT

## 2025-08-02 PROCEDURE — A9577 INJ MULTIHANCE: HCPCS | Performed by: STUDENT IN AN ORGANIZED HEALTH CARE EDUCATION/TRAINING PROGRAM

## 2025-08-02 PROCEDURE — 85014 HEMATOCRIT: CPT

## 2025-08-02 PROCEDURE — 25810000003 SODIUM CHLORIDE 0.9 % SOLUTION: Performed by: PHYSICIAN ASSISTANT

## 2025-08-02 PROCEDURE — 83036 HEMOGLOBIN GLYCOSYLATED A1C: CPT | Performed by: PHYSICIAN ASSISTANT

## 2025-08-02 PROCEDURE — 70496 CT ANGIOGRAPHY HEAD: CPT

## 2025-08-02 PROCEDURE — 99283 EMERGENCY DEPT VISIT LOW MDM: CPT | Performed by: STUDENT IN AN ORGANIZED HEALTH CARE EDUCATION/TRAINING PROGRAM

## 2025-08-02 PROCEDURE — 84439 ASSAY OF FREE THYROXINE: CPT | Performed by: PHYSICIAN ASSISTANT

## 2025-08-02 PROCEDURE — 81003 URINALYSIS AUTO W/O SCOPE: CPT | Performed by: STUDENT IN AN ORGANIZED HEALTH CARE EDUCATION/TRAINING PROGRAM

## 2025-08-02 PROCEDURE — 25010000002 PROCHLORPERAZINE 10 MG/2ML SOLUTION: Performed by: STUDENT IN AN ORGANIZED HEALTH CARE EDUCATION/TRAINING PROGRAM

## 2025-08-02 PROCEDURE — 80050 GENERAL HEALTH PANEL: CPT | Performed by: STUDENT IN AN ORGANIZED HEALTH CARE EDUCATION/TRAINING PROGRAM

## 2025-08-02 PROCEDURE — 96365 THER/PROPH/DIAG IV INF INIT: CPT

## 2025-08-02 PROCEDURE — 84484 ASSAY OF TROPONIN QUANT: CPT | Performed by: STUDENT IN AN ORGANIZED HEALTH CARE EDUCATION/TRAINING PROGRAM

## 2025-08-02 PROCEDURE — 70498 CT ANGIOGRAPHY NECK: CPT

## 2025-08-02 PROCEDURE — 82607 VITAMIN B-12: CPT | Performed by: PHYSICIAN ASSISTANT

## 2025-08-02 PROCEDURE — 85730 THROMBOPLASTIN TIME PARTIAL: CPT | Performed by: PHYSICIAN ASSISTANT

## 2025-08-02 PROCEDURE — 25010000002 KETOROLAC TROMETHAMINE PER 15 MG: Performed by: STUDENT IN AN ORGANIZED HEALTH CARE EDUCATION/TRAINING PROGRAM

## 2025-08-02 RX ORDER — DIPHENHYDRAMINE HYDROCHLORIDE 50 MG/ML
25 INJECTION, SOLUTION INTRAMUSCULAR; INTRAVENOUS ONCE
Status: DISCONTINUED | OUTPATIENT
Start: 2025-08-02 | End: 2025-08-02

## 2025-08-02 RX ORDER — ACETAMINOPHEN 500 MG
1000 TABLET ORAL ONCE
Status: DISCONTINUED | OUTPATIENT
Start: 2025-08-02 | End: 2025-08-02

## 2025-08-02 RX ORDER — MAGNESIUM SULFATE HEPTAHYDRATE 40 MG/ML
2 INJECTION, SOLUTION INTRAVENOUS ONCE
Status: COMPLETED | OUTPATIENT
Start: 2025-08-02 | End: 2025-08-02

## 2025-08-02 RX ORDER — PROCHLORPERAZINE EDISYLATE 5 MG/ML
10 INJECTION INTRAMUSCULAR; INTRAVENOUS ONCE
Status: COMPLETED | OUTPATIENT
Start: 2025-08-02 | End: 2025-08-02

## 2025-08-02 RX ORDER — DEXAMETHASONE SODIUM PHOSPHATE 10 MG/ML
8 INJECTION, SOLUTION INTRA-ARTICULAR; INTRALESIONAL; INTRAMUSCULAR; INTRAVENOUS; SOFT TISSUE ONCE
Status: COMPLETED | OUTPATIENT
Start: 2025-08-02 | End: 2025-08-02

## 2025-08-02 RX ORDER — SODIUM CHLORIDE 0.9 % (FLUSH) 0.9 %
10 SYRINGE (ML) INJECTION AS NEEDED
Status: DISCONTINUED | OUTPATIENT
Start: 2025-08-02 | End: 2025-08-02 | Stop reason: HOSPADM

## 2025-08-02 RX ORDER — KETOROLAC TROMETHAMINE 15 MG/ML
15 INJECTION, SOLUTION INTRAMUSCULAR; INTRAVENOUS ONCE
Status: COMPLETED | OUTPATIENT
Start: 2025-08-02 | End: 2025-08-02

## 2025-08-02 RX ORDER — METOCLOPRAMIDE HYDROCHLORIDE 5 MG/ML
10 INJECTION INTRAMUSCULAR; INTRAVENOUS ONCE
Status: DISCONTINUED | OUTPATIENT
Start: 2025-08-02 | End: 2025-08-02

## 2025-08-02 RX ORDER — IOPAMIDOL 755 MG/ML
150 INJECTION, SOLUTION INTRAVASCULAR
Status: COMPLETED | OUTPATIENT
Start: 2025-08-02 | End: 2025-08-02

## 2025-08-02 RX ORDER — DIPHENHYDRAMINE HYDROCHLORIDE 50 MG/ML
50 INJECTION, SOLUTION INTRAMUSCULAR; INTRAVENOUS ONCE
Status: DISCONTINUED | OUTPATIENT
Start: 2025-08-02 | End: 2025-08-02

## 2025-08-02 RX ADMIN — GADOBENATE DIMEGLUMINE 20 ML: 529 INJECTION, SOLUTION INTRAVENOUS at 19:23

## 2025-08-02 RX ADMIN — PROCHLORPERAZINE EDISYLATE 10 MG: 5 INJECTION INTRAMUSCULAR; INTRAVENOUS at 19:40

## 2025-08-02 RX ADMIN — MAGNESIUM SULFATE HEPTAHYDRATE 2 G: 40 INJECTION, SOLUTION INTRAVENOUS at 19:48

## 2025-08-02 RX ADMIN — IOPAMIDOL 115 ML: 755 INJECTION, SOLUTION INTRAVENOUS at 18:09

## 2025-08-02 RX ADMIN — SODIUM CHLORIDE 1000 ML: 9 INJECTION, SOLUTION INTRAVENOUS at 19:38

## 2025-08-02 RX ADMIN — KETOROLAC TROMETHAMINE 15 MG: 15 INJECTION, SOLUTION INTRAMUSCULAR; INTRAVENOUS at 19:37

## 2025-08-02 RX ADMIN — DEXAMETHASONE SODIUM PHOSPHATE 8 MG: 10 INJECTION INTRAMUSCULAR; INTRAVENOUS at 19:44

## 2025-08-02 NOTE — CONSULTS
[facility]   Teleneurology Note    Patient Name: Fuad Nielsen  : 1980  MRN: [id number]  Primary Care Physician: Mandie Tobar APRN  Referring Site: [place]  Location of Neurologist: [place]    Subjective   Teleneurology Initial Data           Neurologist Evaluation Date: 25 Neurologist Evaluation Time:    Last Known Well: Date Unknown, Time Unknown       History     Chief Complaint: Dizziness    45 years old -American morbidly obese female with known diagnosis of atrial fibrillation on Eliquis, missed several doses last week but she took her Eliquis yesterday around 6 PM, she presented for evaluation of multiple neurological and neurological symptoms described as bifrontal throbbing headache, intermittent dizziness described as lightheaded, fatigue, intermittent difficulty swallowing and intermittent gait imbalance.  Symptoms has been ongoing for about 2 weeks on and off.  She denied focal weakness or numbness.  Currently she described moderate headache, bifrontal, throbbing in nature, associated with nausea but no vomiting, she denied photophobia or phonophobia, denied visual aura.  Denied smoking, social alcohol drinker, denies drug illicit.    Stroke Risk Factors/ Pertinent Data     Stroke risk factors: hypertension, headache/ migrane, sleep apnea, prior stroke/ TIA  Anticoagulants prior to arrival: apixaban (Eliquis)  Statins prior to arrival: none     Scoring Scales     Modified Femi Scale  Pre-Stroke Modified Rains Scale: 0 - No Symptoms at all.  Intracerebral Hemmorhage (ICH) Score  Age>=80: no    NIH Stroke Scale     NIHSS Performed Date: 25 NIHSS Performed Time:    Interval: baseline  1a. Level of Consciousness: 0-->Alert, keenly responsive  1b. LOC Questions: 0-->Answers both questions correctly  1c. LOC Commands: 0-->Performs both tasks correctly  2. Best Gaze: 0-->Normal  3. Visual: 0-->No visual loss  4. Facial Palsy: 0-->Normal symmetrical  movements  5a. Motor Arm, Left: 0-->No drift, limb holds 90 (or 45) degrees for full 10 secs  5b. Motor Arm, Right: 0-->No drift, limb holds 90 (or 45) degrees for full 10 secs  6a. Motor Leg, Left: 0-->No drift, leg holds 30 degree position for full 5 secs  6b. Motor Leg, Right: 0-->No drift, leg holds 30 degree position for full 5 secs  7. Limb Ataxia: 0-->Absent  8. Sensory: 0-->Normal, no sensory loss  9. Best Language: 0-->No aphasia, normal  10. Dysarthria: 0-->Normal  11. Extinction and Inattention (formerly Neglect): 0-->No abnormality  Total (NIH Stroke Scale): 0     Review of Systems     Review of Systems  All other systems reviewed and they were negative except what mentioned in HPI  Objective   Exam     Exam performed with the help of support staff from the referring site  Neurological Exam  General appearance: Well developed, well nourished, well groomed, alert and cooperative.      Higher integrative function: Oriented to time, place, person, intact recent and remote memory, attention span, concentration and language. Spontaneous speech, fund of vocabulary are normal.   CN II: Normal visual fields.   CN III IV VI: Extraocular movements are full without nystagmus. Pupils are equal, round, and reactive to light.   CN V: Normal facial sensation and strength of muscles of mastication.   CN VII: Facial movements are symmetric, no weakness.   CN VIII: Auditory acuity is normal.   CN IX & X: Symmetric palatal movement.   CN XI: Sternocleidomastoid and trapezius are normal. No weakness.   CN XII: The tongue is midline. No atrophy or fasciculations.   Motor: BUE and BLE 5/5. No fasciculations, rigidity, spasticity or abnormal movements.   Sensation: Normal to light touch and temperature throughout  Station and gait: Normal she was able to ambulate unassisted  Muscle stretch reflexes: Deferred  Coordination: Finger to nose test showed no dysmetria.     Result Review    Results          Personal review of CNS  imaging:(Official report by radiologist pending)  Imaging  CT Imaging Review: CT Imaging reviewed, NO acute infarct/ hemorrhage seen  CTA Imaging Review: CTA Imaging reviewed, NO large vessel occlusion or severe stenosis seen  CT Perfusion Review: CT perfusion reviewed, NORMAL    CT angio head and neck degraded by artifact with inappropriate contrast bolus timing.  Overall no large vessel occlusion appreciated.  Thrombolytic   Thrombolytics: thrombolytic not given  Thrombolytic Exclusion Criteria: Onset unknown or GREATER than 4.5 hours     Assessment & Plan   Assessment/ Plan     Assessment:  Acute Stroke Evaluation: Stroke not suspected/ Other (Specify)- the risks of IV thrombolytic outweigh the benefits of treatment   -Multiple neurological and nonneurological symptoms symptoms with bifrontal throbbing headache, fatigue, dizziness described as lightheaded with intermittent gait imbalance and intermittent difficulty swallowing symptoms has been going for over 2 weeks.    Plan:  - MRI brain with and without contrast  - Migraine cocktail with Benadryl 25, Compazine 10 mg, magnesium sulfate 2 g, Toradol 15 mg  - Check urinalysis, UDS, TSH, B12, folate, A1c, lipid profile  - Further recommendations pending the MRI brain    Discussed with patient, nursing staff, ER physician via epic chat.    Disposition     Disposition: The patient will remain at the referring institution for further evaluation and management    Medical Decision Making  Medical Data Reviewed: Data reviewed including: clinical labs, radiology and/or medical tests, Obtaining/ reviewing old medical records, Obtaining case history from another source, Independent review of CNS images  Length of visit: 61 minutes    Noman PURDY MD, saw the patient on 08/02/25 at 1815 for an initial in-patient or emergency room telememedicine face to face consult using interactive technology for 61 minutes. The location of the patient was [place]. GURWINDER was  located at [place].    I have proceeded with this evaluation at the request of the referring practitioner as it is felt to be an emergency setting and no appropriate specialist is available to perform this evaluation. The [facility] hospital has reported that this is the correct patient and has obtained consent from the patient/surrogate to perform this telemedicine evaluation(including obtaining history, performing examination and reviewing data provided by the patient an/or originating site of care provider)    I have introduced myself to the patient, provided my credentials, disclosed my location, and determined that, based on review of the patient's chart and discussion with the patient's primary team, telemedicine via a HIPAA compliant, real-time, face-to-face two-way, interactive audio and video platform is an appropriate and effective means of providing the service.    The patient/surrogate has a right to refuse this evaluation as they have been explained risks including potential loss of confidentiality, benefits, alternatives, and the potential need for subsequent face-to-face care. In this evaluation, we will be providing recommendations only.  The ultimate decision to follow or not to follow these recommendations will be left to the bedside treating/requesting practitioner.    The patient/surrogate has been notified that other healthcare professionals including technical person may be involved in this A/V evaluation.  All laws concerning confidentiality and patient access to medical records and copies of medical records apply to telemedicine.  The patient/surrogate has received the originating [facility]'s Health Notice of Privacy Practices.    Noman Nunez MD

## 2025-08-02 NOTE — FSED PROVIDER NOTE
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 (COMPAZINE) injection 10 mg (10 mg Intravenous Given 8/2/25 1940)   magnesium sulfate 2g/50 mL (PREMIX) infusion (0 g Intravenous Stopped 8/2/25 2138)   ketorolac (TORADOL) injection 15 mg (15 mg Intravenous Given 8/2/25 1937)   gadobenate dimeglumine (MULTIHANCE) injection 20 mL (20 mL Intravenous Given 8/2/25 1923)           -----  ED Disposition       ED Disposition   Discharge    Condition   Stable    Comment   --             Final diagnoses:   Dizziness   Hypertension, unspecified type   Migraine without aura and without status migrainosus, not intractable   Orthostasis     Your Follow-Up Providers    Follow-up information has not been specified.       Contact information for after-discharge care    Follow-up information has not been specified.          Your medication list        CONTINUE taking these medications        Instructions Last Dose Given Next Dose Due   amLODIPine 10 MG tablet  Commonly known as: NORVASC      Take 1 tablet by mouth Daily.       Eliquis 5 MG tablet tablet  Generic drug: apixaban      Take 1 tablet by mouth 2 (Two) Times a Day.       escitalopram 10 MG tablet  Commonly known as: Lexapro      Take 1 tablet by mouth Daily.       ferrous gluconate 324 MG tablet  Commonly known as: FERGON      Take 1 tablet by mouth Daily With Breakfast.        lisinopril 20 MG tablet  Commonly known as: PRINIVIL,ZESTRIL      Take 1 tablet by mouth Daily.       metoprolol tartrate 25 MG tablet  Commonly known as: LOPRESSOR      Take 0.5 tablets by mouth Every 12 (Twelve) Hours.       NEXPLANON SC      Inject  under the skin into the appropriate area as directed.       triamcinolone 0.1 % cream  Commonly known as: KENALOG      Apply 1 Application topically to the appropriate area as directed 2 (Two) Times a Day.       Vitamin D3 1.25 MG (18550 UT) capsule      Take 1 capsule by mouth Every 7 (Seven) Days for 8 doses.

## 2025-08-03 LAB
CHOLEST SERPL-MCNC: 141 MG/DL (ref 0–200)
HDLC SERPL-MCNC: 45 MG/DL (ref 40–60)
LDLC SERPL CALC-MCNC: 69 MG/DL (ref 0–100)
LDLC/HDLC SERPL: 1.44 {RATIO}
TRIGL SERPL-MCNC: 157 MG/DL (ref 0–150)
VIT B12 BLD-MCNC: 511 PG/ML (ref 211–946)
VLDLC SERPL-MCNC: 27 MG/DL (ref 5–40)

## 2025-08-03 NOTE — DISCHARGE INSTRUCTIONS
Please continue your home medications.  Please do not see further doses of your Eliquis.  Follow close with your PCP.  Please return to ED for any worsening symptoms or concerns.

## 2025-08-04 LAB
QT INTERVAL: 352 MS
QTC INTERVAL: 437 MS

## 2025-08-12 ENCOUNTER — PATIENT OUTREACH (OUTPATIENT)
Dept: CASE MANAGEMENT | Facility: OTHER | Age: 45
End: 2025-08-12
Payer: COMMERCIAL

## 2025-08-25 LAB
QT INTERVAL: 352 MS
QTC INTERVAL: 437 MS

## (undated) DEVICE — SYR LUERLOK 50ML

## (undated) DEVICE — Device: Brand: DEFENDO AIR/WATER/SUCTION AND BIOPSY VALVE

## (undated) DEVICE — Device: Brand: AIR/WATER CHANNEL CLEANING ADAPTER

## (undated) DEVICE — SINGLE-USE BIOPSY FORCEPS: Brand: RADIAL JAW 4

## (undated) DEVICE — ENDOGATOR HYBRID TUBING KIT FOR USE WITH ENDOGATOR IRRIGATION PUMP, OLYMPUS PUMP, GI4000 ESU, AND TORRENT IRRIGATION PUMP.: Brand: ENDOGATOR KIT

## (undated) DEVICE — THE BITE BLOCK MAXI, LATEX FREE STRAP IS USED TO PROTECT THE ENDOSCOPE INSERTION TUBE FROM BEING BITTEN BY THE PATIENT.

## (undated) DEVICE — TUBING,OXYGEN,CRUSH RES,7',CLEAR,UC: Brand: MEDLINE INDUSTRIES, INC.

## (undated) DEVICE — CONTN GRAD MEAS TRIANG 32OZ BLK